# Patient Record
Sex: FEMALE | Race: WHITE | NOT HISPANIC OR LATINO | Employment: OTHER | URBAN - METROPOLITAN AREA
[De-identification: names, ages, dates, MRNs, and addresses within clinical notes are randomized per-mention and may not be internally consistent; named-entity substitution may affect disease eponyms.]

---

## 2017-05-05 ENCOUNTER — HOSPITAL ENCOUNTER (OUTPATIENT)
Dept: CARDIOLOGY | Facility: HOSPITAL | Age: 82
Discharge: HOME | End: 2017-05-05
Attending: INTERNAL MEDICINE | Admitting: INTERNAL MEDICINE

## 2017-06-06 ENCOUNTER — HOSPITAL ENCOUNTER (OUTPATIENT)
Dept: CT IMAGING | Facility: HOSPITAL | Age: 82
Discharge: HOME | End: 2017-06-06
Attending: THORACIC SURGERY (CARDIOTHORACIC VASCULAR SURGERY) | Admitting: THORACIC SURGERY (CARDIOTHORACIC VASCULAR SURGERY)

## 2017-06-06 LAB
ANION GAP SERPL CALC-SCNC: 10.6 MMOL/L (ref 10–20)
BUN SERPL-MCNC: 13 MG/DL (ref 8–20)
BUN/CREAT SERPL: 11.8 (ref 5.4–26.2)
CALCIUM SERPL-MCNC: 9.3 MG/DL (ref 8.9–10.3)
CHLORIDE SERPL-SCNC: 111 MMOL/L (ref 101–111)
CONV CO2: 26 MMOL/L (ref 22–32)
CREAT UR-MCNC: 1.1 MG/DL (ref 0.4–1)
GLUCOSE SERPL-MCNC: 111 MG/DL (ref 65–99)
POTASSIUM SERPL-SCNC: 3.6 MMOL/L (ref 3.6–5.1)
SODIUM SERPL-SCNC: 144 MMOL/L (ref 136–144)

## 2017-09-08 ENCOUNTER — HOSPITAL ENCOUNTER (OUTPATIENT)
Dept: LAB | Facility: HOSPITAL | Age: 82
Setting detail: SPECIMEN
Discharge: HOME | End: 2017-09-08
Attending: INTERNAL MEDICINE | Admitting: INTERNAL MEDICINE

## 2017-09-08 ENCOUNTER — HOSPITAL ENCOUNTER (OUTPATIENT)
Dept: ULTRASOUND IMAGING | Facility: HOSPITAL | Age: 82
Discharge: HOME | End: 2017-09-08
Attending: INTERNAL MEDICINE | Admitting: INTERNAL MEDICINE

## 2017-09-08 LAB — TSH SERPL-ACNC: 1.81 UIU/ML (ref 0.34–5.6)

## 2018-02-01 ENCOUNTER — HOSPITAL ENCOUNTER (OUTPATIENT)
Dept: LAB | Facility: HOSPITAL | Age: 83
Discharge: HOME | End: 2018-02-01
Attending: INTERNAL MEDICINE | Admitting: INTERNAL MEDICINE

## 2018-02-01 LAB
ANION GAP SERPL CALC-SCNC: 12.8 MMOL/L (ref 10–20)
BUN SERPL-MCNC: 14 MG/DL (ref 8–20)
BUN/CREAT SERPL: 12.7 (ref 5.4–26.2)
CALCIUM SERPL-MCNC: 9.9 MG/DL (ref 8.9–10.3)
CHLORIDE SERPL-SCNC: 108 MMOL/L (ref 101–111)
CONV CO2: 25 MMOL/L (ref 22–32)
CREAT UR-MCNC: 1.1 MG/DL (ref 0.4–1)
GLUCOSE SERPL-MCNC: 97 MG/DL (ref 65–99)
POTASSIUM SERPL-SCNC: 3.8 MMOL/L (ref 3.6–5.1)
SODIUM SERPL-SCNC: 142 MMOL/L (ref 136–144)

## 2018-02-08 ENCOUNTER — HOSPITAL ENCOUNTER (OUTPATIENT)
Dept: FAMILY MEDICINE CLINIC | Facility: CLINIC | Age: 83
Setting detail: SPECIMEN
Discharge: HOME | End: 2018-02-08
Attending: FAMILY MEDICINE | Admitting: FAMILY MEDICINE

## 2018-02-08 LAB
BASOPHILS # BLD AUTO: 0 10*3/UL (ref 0–0.2)
BASOPHILS NFR BLD AUTO: 0 % (ref 0–2)
CHOLEST SERPL-MCNC: 190 MG/DL
CHOLEST/HDLC SERPL: 3.3 {RATIO}
CONV LDL CHOLESTEROL DIRECT: 108 MG/DL (ref 0–100)
DIFFERENTIAL METHOD BLD: (no result)
EOSINOPHIL # BLD AUTO: 0.6 10*3/UL (ref 0–0.3)
EOSINOPHIL # BLD AUTO: 9 % (ref 0–3)
ERYTHROCYTE [DISTWIDTH] IN BLOOD BY AUTOMATED COUNT: 12.8 % (ref 11.5–14.5)
HCT VFR BLD AUTO: 33.1 % (ref 35–49)
HDLC SERPL-MCNC: 58 MG/DL
HGB BLD-MCNC: 11 G/DL (ref 12–15)
LDLC/HDLC SERPL: 1.9 {RATIO}
LIPID INTERPRETATION: ABNORMAL
LYMPHOCYTES # BLD AUTO: 1.5 10*3/UL (ref 0.8–4.8)
LYMPHOCYTES NFR BLD AUTO: 22 % (ref 18–42)
MCH RBC QN AUTO: 30.1 PG (ref 26–32)
MCHC RBC AUTO-ENTMCNC: 33.2 G/DL (ref 32–36)
MCV RBC AUTO: 90.6 FL (ref 80–94)
MONOCYTES # BLD AUTO: 0.6 10*3/UL (ref 0.1–1.3)
MONOCYTES NFR BLD AUTO: 9 % (ref 2–11)
NEUTROPHILS # BLD AUTO: 4.1 10*3/UL (ref 2.3–8.6)
NEUTROPHILS NFR BLD AUTO: 60 % (ref 50–75)
NRBC BLD AUTO-RTO: 0 /100{WBCS}
NRBC/RBC NFR BLD MANUAL: 0 10*3/UL
PLATELET # BLD AUTO: 236 10*3/UL (ref 150–450)
PMV BLD AUTO: 8 FL (ref 7.4–10.4)
RBC # BLD AUTO: 3.65 10*6/UL (ref 4–5.4)
TRIGL SERPL-MCNC: 111 MG/DL
VLDLC SERPL CALC-MCNC: 24 MG/DL
WBC # BLD AUTO: 6.9 10*3/UL (ref 4.5–11.5)

## 2018-07-31 ENCOUNTER — INPATIENT HOSPITAL (AMBULATORY)
Dept: URBAN - METROPOLITAN AREA HOSPITAL 84 | Facility: HOSPITAL | Age: 83
End: 2018-07-31
Payer: MEDICARE

## 2018-07-31 DIAGNOSIS — D50.9 IRON DEFICIENCY ANEMIA, UNSPECIFIED: ICD-10-CM

## 2018-07-31 DIAGNOSIS — R07.9 CHEST PAIN, UNSPECIFIED: ICD-10-CM

## 2018-07-31 PROCEDURE — 99222 1ST HOSP IP/OBS MODERATE 55: CPT | Performed by: NURSE PRACTITIONER

## 2018-08-01 ENCOUNTER — INPATIENT HOSPITAL (AMBULATORY)
Dept: URBAN - METROPOLITAN AREA HOSPITAL 84 | Facility: HOSPITAL | Age: 83
End: 2018-08-01
Payer: MEDICARE

## 2018-08-01 DIAGNOSIS — K57.30 DIVERTICULOSIS OF LARGE INTESTINE WITHOUT PERFORATION OR ABS: ICD-10-CM

## 2018-08-01 DIAGNOSIS — D50.9 IRON DEFICIENCY ANEMIA, UNSPECIFIED: ICD-10-CM

## 2018-08-01 DIAGNOSIS — K44.9 DIAPHRAGMATIC HERNIA WITHOUT OBSTRUCTION OR GANGRENE: ICD-10-CM

## 2018-08-01 DIAGNOSIS — K64.9 UNSPECIFIED HEMORRHOIDS: ICD-10-CM

## 2018-08-01 DIAGNOSIS — D12.3 BENIGN NEOPLASM OF TRANSVERSE COLON: ICD-10-CM

## 2018-08-01 DIAGNOSIS — C18.0 MALIGNANT NEOPLASM OF CECUM: ICD-10-CM

## 2018-08-01 PROCEDURE — 45380 COLONOSCOPY AND BIOPSY: CPT | Performed by: INTERNAL MEDICINE

## 2018-08-01 PROCEDURE — 43235 EGD DIAGNOSTIC BRUSH WASH: CPT | Performed by: INTERNAL MEDICINE

## 2018-08-02 ENCOUNTER — INPATIENT HOSPITAL (AMBULATORY)
Dept: URBAN - METROPOLITAN AREA HOSPITAL 84 | Facility: HOSPITAL | Age: 83
End: 2018-08-02
Payer: MEDICARE

## 2018-08-02 DIAGNOSIS — R07.9 CHEST PAIN, UNSPECIFIED: ICD-10-CM

## 2018-08-02 DIAGNOSIS — D50.9 IRON DEFICIENCY ANEMIA, UNSPECIFIED: ICD-10-CM

## 2018-08-02 PROCEDURE — 99232 SBSQ HOSP IP/OBS MODERATE 35: CPT | Performed by: NURSE PRACTITIONER

## 2018-08-22 ENCOUNTER — HOSPITAL ENCOUNTER (OUTPATIENT)
Dept: LAB | Facility: HOSPITAL | Age: 83
Discharge: HOME | End: 2018-08-22
Attending: SURGERY | Admitting: SURGERY

## 2018-08-22 LAB
ALBUMIN SERPL-MCNC: 3.1 G/DL (ref 3.5–4.8)
ALBUMIN/GLOB SERPL: 0.9 {RATIO} (ref 1–1.7)
ALP SERPL-CCNC: 56 IU/L (ref 32–91)
ALT SERPL-CCNC: 13 IU/L (ref 14–54)
ANION GAP SERPL CALC-SCNC: 9.4 MMOL/L (ref 10–20)
AST SERPL-CCNC: 21 IU/L (ref 15–41)
BILIRUB SERPL-MCNC: 0.4 MG/DL (ref 0.3–1.2)
BUN SERPL-MCNC: 15 MG/DL (ref 8–20)
BUN/CREAT SERPL: 11.5 (ref 5.4–26.2)
CALCIUM SERPL-MCNC: 9.3 MG/DL (ref 8.9–10.3)
CHLORIDE SERPL-SCNC: 108 MMOL/L (ref 101–111)
CONV ABS BANDS: 0.9 10*3/UL
CONV ANISOCYTES: (no result)
CONV CO2: 27 MMOL/L (ref 22–32)
CONV MICROCYTES IN BLOOD BY LIGHT MICROSCOPY: SLIGHT
CONV POIKILOCYTOSIS IN BLOOD BY LIGHT MICROSCOPY: (no result)
CONV TOTAL PROTEIN: 6.7 G/DL (ref 6.1–7.9)
CONV TOXIC GRANULES IN BLOOD BY LIGHT MICROSCOPY: SLIGHT
CREAT UR-MCNC: 1.3 MG/DL (ref 0.4–1)
DIFFERENTIAL METHOD BLD: (no result)
EOSINOPHIL # BLD AUTO: 0.1 10*3/UL (ref 0–0.3)
EOSINOPHIL # BLD AUTO: 2 % (ref 0–3)
ERYTHROCYTE [DISTWIDTH] IN BLOOD BY AUTOMATED COUNT: (no result) % (ref 11.5–14.5)
GLOBULIN UR ELPH-MCNC: 3.6 G/DL (ref 2.5–3.8)
GLUCOSE SERPL-MCNC: 118 MG/DL (ref 65–99)
HCT VFR BLD AUTO: 30.6 % (ref 35–49)
HGB BLD-MCNC: 9.6 G/DL (ref 12–15)
IRON SERPL-MCNC: 40 UG/DL (ref 28–170)
LYMPHOCYTES # BLD AUTO: 1.6 10*3/UL (ref 0.8–4.8)
LYMPHOCYTES NFR BLD AUTO: 23 % (ref 18–42)
MAGNESIUM SERPL-MCNC: 2.1 MG/DL (ref 1.8–2.5)
MCH RBC QN AUTO: 25 PG (ref 26–32)
MCHC RBC AUTO-ENTMCNC: 31.3 G/DL (ref 32–36)
MCV RBC AUTO: 79.8 FL (ref 80–94)
MONOCYTES # BLD AUTO: 0.5 10*3/UL (ref 0.1–1.3)
MONOCYTES NFR BLD AUTO: 7 % (ref 2–11)
NEUTROPHILS # BLD AUTO: 3.7 10*3/UL (ref 2.3–8.6)
NEUTROPHILS NFR BLD AUTO: 55 % (ref 50–75)
NEUTS BAND NFR BLD MANUAL: 13 % (ref 0–5)
PLATELET # BLD AUTO: 418 10*3/UL (ref 150–450)
PMV BLD AUTO: 7.7 FL (ref 7.4–10.4)
POTASSIUM SERPL-SCNC: 4.4 MMOL/L (ref 3.6–5.1)
RBC # BLD AUTO: 3.84 10*6/UL (ref 4–5.4)
SODIUM SERPL-SCNC: 140 MMOL/L (ref 136–144)
WBC # BLD AUTO: 6.8 10*3/UL (ref 4.5–11.5)

## 2018-11-20 ENCOUNTER — HOSPITAL ENCOUNTER (OUTPATIENT)
Dept: FAMILY MEDICINE CLINIC | Facility: CLINIC | Age: 83
Setting detail: SPECIMEN
Discharge: HOME | End: 2018-11-20
Attending: FAMILY MEDICINE | Admitting: FAMILY MEDICINE

## 2018-11-20 LAB
ANION GAP SERPL CALC-SCNC: 11.9 MMOL/L (ref 10–20)
BASOPHILS # BLD AUTO: 0 10*3/UL (ref 0–0.2)
BASOPHILS NFR BLD AUTO: 0 % (ref 0–2)
BUN SERPL-MCNC: 19 MG/DL (ref 8–20)
BUN/CREAT SERPL: 19 (ref 5.4–26.2)
CALCIUM SERPL-MCNC: 9.8 MG/DL (ref 8.9–10.3)
CHLORIDE SERPL-SCNC: 108 MMOL/L (ref 101–111)
CONV CO2: 26 MMOL/L (ref 22–32)
CONV SMEAR REVIEW: (no result)
CREAT UR-MCNC: 1 MG/DL (ref 0.4–1)
DIFFERENTIAL METHOD BLD: (no result)
EOSINOPHIL # BLD AUTO: 0.5 10*3/UL (ref 0–0.3)
EOSINOPHIL # BLD AUTO: 7 % (ref 0–3)
ERYTHROCYTE [DISTWIDTH] IN BLOOD BY AUTOMATED COUNT: 14.3 % (ref 11.5–14.5)
GLUCOSE SERPL-MCNC: 132 MG/DL (ref 65–99)
HCT VFR BLD AUTO: 40.7 % (ref 35–49)
HGB BLD-MCNC: 13.7 G/DL (ref 12–15)
LYMPHOCYTES # BLD AUTO: 2 10*3/UL (ref 0.8–4.8)
LYMPHOCYTES NFR BLD AUTO: 28 % (ref 18–42)
MCH RBC QN AUTO: 30.6 PG (ref 26–32)
MCHC RBC AUTO-ENTMCNC: 33.7 G/DL (ref 32–36)
MCV RBC AUTO: 90.6 FL (ref 80–94)
MONOCYTES # BLD AUTO: 0.5 10*3/UL (ref 0.1–1.3)
MONOCYTES NFR BLD AUTO: 7 % (ref 2–11)
NEUTROPHILS # BLD AUTO: 4 10*3/UL (ref 2.3–8.6)
NEUTROPHILS NFR BLD AUTO: 58 % (ref 50–75)
NRBC BLD AUTO-RTO: 0 /100{WBCS}
PLATELET # BLD AUTO: 226 10*3/UL (ref 150–450)
PMV BLD AUTO: 8 FL (ref 7.4–10.4)
POTASSIUM SERPL-SCNC: 3.9 MMOL/L (ref 3.6–5.1)
RBC # BLD AUTO: 4.49 10*6/UL (ref 4–5.4)
SODIUM SERPL-SCNC: 142 MMOL/L (ref 136–144)
WBC # BLD AUTO: 7 10*3/UL (ref 4.5–11.5)

## 2019-03-29 ENCOUNTER — HOSPITAL ENCOUNTER (OUTPATIENT)
Dept: LAB | Facility: HOSPITAL | Age: 84
Discharge: HOME | End: 2019-03-29
Attending: INTERNAL MEDICINE | Admitting: INTERNAL MEDICINE

## 2019-03-29 LAB
ALBUMIN SERPL-MCNC: 4 G/DL (ref 3.5–4.8)
ALBUMIN/GLOB SERPL: 1.2 {RATIO} (ref 1–1.7)
ALP SERPL-CCNC: 48 IU/L (ref 32–91)
ALT SERPL-CCNC: 18 IU/L (ref 14–54)
ANION GAP SERPL CALC-SCNC: 15 MMOL/L (ref 10–20)
AST SERPL-CCNC: 21 IU/L (ref 15–41)
BASOPHILS # BLD AUTO: 0 10*3/UL (ref 0–0.2)
BASOPHILS NFR BLD AUTO: 1 % (ref 0–2)
BILIRUB SERPL-MCNC: 0.8 MG/DL (ref 0.3–1.2)
BUN SERPL-MCNC: 26 MG/DL (ref 8–20)
BUN/CREAT SERPL: 20 (ref 5.4–26.2)
CALCIUM SERPL-MCNC: 10.2 MG/DL (ref 8.9–10.3)
CHLORIDE SERPL-SCNC: 106 MMOL/L (ref 101–111)
CHOLEST SERPL-MCNC: 218 MG/DL
CHOLEST/HDLC SERPL: 4.1 {RATIO}
CK SERPL-CCNC: 113 IU/L (ref 38–234)
CONV CO2: 26 MMOL/L (ref 22–32)
CONV LDL CHOLESTEROL DIRECT: 139 MG/DL (ref 0–100)
CONV TOTAL PROTEIN: 7.3 G/DL (ref 6.1–7.9)
CREAT UR-MCNC: 1.3 MG/DL (ref 0.4–1)
DIFFERENTIAL METHOD BLD: (no result)
EOSINOPHIL # BLD AUTO: 1 10*3/UL (ref 0–0.3)
EOSINOPHIL # BLD AUTO: 14 % (ref 0–3)
ERYTHROCYTE [DISTWIDTH] IN BLOOD BY AUTOMATED COUNT: 13.1 % (ref 11.5–14.5)
GLOBULIN UR ELPH-MCNC: 3.3 G/DL (ref 2.5–3.8)
GLUCOSE SERPL-MCNC: 93 MG/DL (ref 65–99)
HCT VFR BLD AUTO: 43.1 % (ref 35–49)
HDLC SERPL-MCNC: 53 MG/DL
HGB BLD-MCNC: 14.5 G/DL (ref 12–15)
LDLC/HDLC SERPL: 2.6 {RATIO}
LIPID INTERPRETATION: ABNORMAL
LYMPHOCYTES # BLD AUTO: 1.8 10*3/UL (ref 0.8–4.8)
LYMPHOCYTES NFR BLD AUTO: 26 % (ref 18–42)
MCH RBC QN AUTO: 32 PG (ref 26–32)
MCHC RBC AUTO-ENTMCNC: 33.7 G/DL (ref 32–36)
MCV RBC AUTO: 94.9 FL (ref 80–94)
MONOCYTES # BLD AUTO: 0.6 10*3/UL (ref 0.1–1.3)
MONOCYTES NFR BLD AUTO: 8 % (ref 2–11)
NEUTROPHILS # BLD AUTO: 3.4 10*3/UL (ref 2.3–8.6)
NEUTROPHILS NFR BLD AUTO: 51 % (ref 50–75)
NRBC BLD AUTO-RTO: 0 /100{WBCS}
NRBC/RBC NFR BLD MANUAL: 0 10*3/UL
PLATELET # BLD AUTO: 233 10*3/UL (ref 150–450)
PMV BLD AUTO: 7.4 FL (ref 7.4–10.4)
POTASSIUM SERPL-SCNC: 5 MMOL/L (ref 3.6–5.1)
RBC # BLD AUTO: 4.54 10*6/UL (ref 4–5.4)
SODIUM SERPL-SCNC: 142 MMOL/L (ref 136–144)
TRIGL SERPL-MCNC: 154 MG/DL
VLDLC SERPL CALC-MCNC: 26.1 MG/DL
WBC # BLD AUTO: 6.7 10*3/UL (ref 4.5–11.5)

## 2019-06-22 ENCOUNTER — HOSPITAL ENCOUNTER (EMERGENCY)
Facility: HOSPITAL | Age: 84
Discharge: HOME | End: 2019-06-22
Attending: EMERGENCY MEDICINE | Admitting: EMERGENCY MEDICINE

## 2019-06-22 ENCOUNTER — APPOINTMENT (OUTPATIENT)
Dept: GENERAL RADIOLOGY | Facility: HOSPITAL | Age: 84
End: 2019-06-22

## 2019-06-22 VITALS
OXYGEN SATURATION: 90 % | DIASTOLIC BLOOD PRESSURE: 65 MMHG | BODY MASS INDEX: 24.34 KG/M2 | WEIGHT: 151.46 LBS | HEIGHT: 66 IN | HEART RATE: 95 BPM | TEMPERATURE: 98.4 F | SYSTOLIC BLOOD PRESSURE: 135 MMHG | RESPIRATION RATE: 16 BRPM

## 2019-06-22 DIAGNOSIS — R11.2 NAUSEA AND VOMITING, INTRACTABILITY OF VOMITING NOT SPECIFIED, UNSPECIFIED VOMITING TYPE: Primary | ICD-10-CM

## 2019-06-22 DIAGNOSIS — R19.7 DIARRHEA, UNSPECIFIED TYPE: ICD-10-CM

## 2019-06-22 LAB
ALBUMIN SERPL-MCNC: 3.8 G/DL (ref 3.5–4.8)
ALBUMIN/GLOB SERPL: 1.1 G/DL (ref 1–1.7)
ALP SERPL-CCNC: 49 U/L (ref 32–91)
ALT SERPL W P-5'-P-CCNC: 15 U/L (ref 14–54)
ANION GAP SERPL CALCULATED.3IONS-SCNC: 12 MMOL/L (ref 10–20)
AST SERPL-CCNC: 15 U/L (ref 15–41)
BACTERIA UR QL AUTO: ABNORMAL /HPF
BASOPHILS # BLD AUTO: 0 10*3/MM3 (ref 0–0.2)
BASOPHILS NFR BLD AUTO: 0.2 % (ref 0–1.5)
BILIRUB SERPL-MCNC: 0.8 MG/DL (ref 0.3–1.2)
BILIRUB UR QL STRIP: NEGATIVE
BNP SERPL-MCNC: 43 PG/ML
BUN BLD-MCNC: 19 MG/DL (ref 8–20)
BUN/CREAT SERPL: 15.8 (ref 5.4–26.2)
CALCIUM SPEC-SCNC: 10 MG/DL (ref 8.9–10.3)
CHLORIDE SERPL-SCNC: 109 MMOL/L (ref 101–111)
CLARITY UR: ABNORMAL
CO2 SERPL-SCNC: 18 MMOL/L (ref 22–32)
COD CRY URNS QL: ABNORMAL /HPF
COLOR UR: ABNORMAL
CREAT BLD-MCNC: 1.2 MG/DL (ref 0.4–1)
DEPRECATED RDW RBC AUTO: 42 FL (ref 37–54)
EOSINOPHIL # BLD AUTO: 0.8 10*3/MM3 (ref 0–0.4)
EOSINOPHIL NFR BLD AUTO: 7.2 % (ref 0.3–6.2)
ERYTHROCYTE [DISTWIDTH] IN BLOOD BY AUTOMATED COUNT: 12.7 % (ref 12.3–15.4)
GFR SERPL CREATININE-BSD FRML MDRD: 42 ML/MIN/1.73
GLOBULIN UR ELPH-MCNC: 3.5 GM/DL (ref 2.5–3.8)
GLUCOSE BLD-MCNC: 127 MG/DL (ref 65–99)
GLUCOSE UR STRIP-MCNC: NEGATIVE MG/DL
GRAN CASTS URNS QL MICRO: ABNORMAL /LPF
HCT VFR BLD AUTO: 42.1 % (ref 34–46.6)
HGB BLD-MCNC: 14.2 G/DL (ref 12–15.9)
HGB UR QL STRIP.AUTO: NEGATIVE
HYALINE CASTS UR QL AUTO: ABNORMAL /LPF
KETONES UR QL STRIP: NEGATIVE
LEUKOCYTE ESTERASE UR QL STRIP.AUTO: ABNORMAL
LYMPHOCYTES # BLD AUTO: 1.2 10*3/MM3 (ref 0.7–3.1)
LYMPHOCYTES NFR BLD AUTO: 10.4 % (ref 19.6–45.3)
MCH RBC QN AUTO: 31.8 PG (ref 26.6–33)
MCHC RBC AUTO-ENTMCNC: 33.7 G/DL (ref 31.5–35.7)
MCV RBC AUTO: 94.4 FL (ref 79–97)
MONOCYTES # BLD AUTO: 0.7 10*3/MM3 (ref 0.1–0.9)
MONOCYTES NFR BLD AUTO: 6.4 % (ref 5–12)
NEUTROPHILS # BLD AUTO: 8.4 10*3/MM3 (ref 1.7–7)
NEUTROPHILS NFR BLD AUTO: 75.8 % (ref 42.7–76)
NITRITE UR QL STRIP: NEGATIVE
NRBC BLD AUTO-RTO: 0 /100 WBC (ref 0–0.2)
PH UR STRIP.AUTO: 6 [PH] (ref 5–8)
PLATELET # BLD AUTO: 237 10*3/MM3 (ref 140–450)
PMV BLD AUTO: 8.2 FL (ref 6–12)
POTASSIUM BLD-SCNC: 4 MMOL/L (ref 3.6–5.1)
PROT SERPL-MCNC: 7.3 G/DL (ref 6.1–7.9)
PROT UR QL STRIP: ABNORMAL
RBC # BLD AUTO: 4.46 10*6/MM3 (ref 3.77–5.28)
RBC # UR: ABNORMAL /HPF
REF LAB TEST METHOD: ABNORMAL
SODIUM BLD-SCNC: 139 MMOL/L (ref 136–144)
SP GR UR STRIP: 1.02 (ref 1–1.03)
SQUAMOUS #/AREA URNS HPF: ABNORMAL /HPF
TROPONIN I SERPL-MCNC: <0.03 NG/ML (ref 0–0.03)
UROBILINOGEN UR QL STRIP: ABNORMAL
WBC NRBC COR # BLD: 11.1 10*3/MM3 (ref 3.4–10.8)
WBC UR QL AUTO: ABNORMAL /HPF
YEAST URNS QL MICRO: ABNORMAL /HPF

## 2019-06-22 PROCEDURE — 80053 COMPREHEN METABOLIC PANEL: CPT | Performed by: EMERGENCY MEDICINE

## 2019-06-22 PROCEDURE — 25010000002 ONDANSETRON PER 1 MG: Performed by: EMERGENCY MEDICINE

## 2019-06-22 PROCEDURE — 81001 URINALYSIS AUTO W/SCOPE: CPT | Performed by: EMERGENCY MEDICINE

## 2019-06-22 PROCEDURE — 84484 ASSAY OF TROPONIN QUANT: CPT | Performed by: EMERGENCY MEDICINE

## 2019-06-22 PROCEDURE — 96374 THER/PROPH/DIAG INJ IV PUSH: CPT

## 2019-06-22 PROCEDURE — 99284 EMERGENCY DEPT VISIT MOD MDM: CPT

## 2019-06-22 PROCEDURE — 83880 ASSAY OF NATRIURETIC PEPTIDE: CPT | Performed by: EMERGENCY MEDICINE

## 2019-06-22 PROCEDURE — 71045 X-RAY EXAM CHEST 1 VIEW: CPT

## 2019-06-22 PROCEDURE — 85025 COMPLETE CBC W/AUTO DIFF WBC: CPT | Performed by: EMERGENCY MEDICINE

## 2019-06-22 RX ORDER — SODIUM CHLORIDE 0.9 % (FLUSH) 0.9 %
10 SYRINGE (ML) INJECTION AS NEEDED
Status: DISCONTINUED | OUTPATIENT
Start: 2019-06-22 | End: 2019-06-23 | Stop reason: HOSPADM

## 2019-06-22 RX ORDER — ONDANSETRON 4 MG/1
4 TABLET, FILM COATED ORAL 4 TIMES DAILY PRN
Qty: 15 TABLET | Refills: 0 | Status: SHIPPED | OUTPATIENT
Start: 2019-06-22 | End: 2019-07-05 | Stop reason: SDUPTHER

## 2019-06-22 RX ORDER — ONDANSETRON 2 MG/ML
4 INJECTION INTRAMUSCULAR; INTRAVENOUS ONCE
Status: COMPLETED | OUTPATIENT
Start: 2019-06-22 | End: 2019-06-22

## 2019-06-22 RX ADMIN — SODIUM CHLORIDE, SODIUM LACTATE, POTASSIUM CHLORIDE, AND CALCIUM CHLORIDE 500 ML: .6; .31; .03; .02 INJECTION, SOLUTION INTRAVENOUS at 20:51

## 2019-06-22 RX ADMIN — ONDANSETRON 4 MG: 2 INJECTION INTRAMUSCULAR; INTRAVENOUS at 23:05

## 2019-06-28 RX ORDER — ASPIRIN 81 MG/1
1 TABLET ORAL DAILY
COMMUNITY
Start: 2012-11-08 | End: 2019-09-24

## 2019-06-28 RX ORDER — NITROGLYCERIN 0.4 MG/1
TABLET SUBLINGUAL
COMMUNITY
Start: 2019-04-08 | End: 2021-01-04 | Stop reason: SDUPTHER

## 2019-07-05 ENCOUNTER — OFFICE VISIT (OUTPATIENT)
Dept: FAMILY MEDICINE CLINIC | Facility: CLINIC | Age: 84
End: 2019-07-05

## 2019-07-05 VITALS
DIASTOLIC BLOOD PRESSURE: 76 MMHG | OXYGEN SATURATION: 95 % | SYSTOLIC BLOOD PRESSURE: 137 MMHG | WEIGHT: 153 LBS | TEMPERATURE: 98.2 F | HEIGHT: 64 IN | HEART RATE: 78 BPM | BODY MASS INDEX: 26.12 KG/M2

## 2019-07-05 DIAGNOSIS — G89.29 CHRONIC PAIN OF BOTH KNEES: Primary | ICD-10-CM

## 2019-07-05 DIAGNOSIS — M25.561 CHRONIC PAIN OF BOTH KNEES: Primary | ICD-10-CM

## 2019-07-05 DIAGNOSIS — R11.0 NAUSEA: ICD-10-CM

## 2019-07-05 DIAGNOSIS — M25.562 CHRONIC PAIN OF BOTH KNEES: Primary | ICD-10-CM

## 2019-07-05 PROCEDURE — 99213 OFFICE O/P EST LOW 20 MIN: CPT | Performed by: FAMILY MEDICINE

## 2019-07-05 RX ORDER — ONDANSETRON 4 MG/1
4 TABLET, ORALLY DISINTEGRATING ORAL EVERY 8 HOURS PRN
Qty: 20 TABLET | Refills: 0 | Status: SHIPPED | OUTPATIENT
Start: 2019-07-05 | End: 2019-09-24

## 2019-07-05 RX ORDER — BRINZOLAMIDE 1 %
SUSPENSION, DROPS(FINAL DOSAGE FORM)(ML) OPHTHALMIC (EYE)
COMMUNITY
Start: 2019-05-14 | End: 2019-07-05

## 2019-07-05 RX ORDER — NETARSUDIL 0.2 MG/ML
SOLUTION/ DROPS OPHTHALMIC; TOPICAL
COMMUNITY
Start: 2019-06-17 | End: 2021-10-11

## 2019-07-05 NOTE — PROGRESS NOTES
Subjective   Flori Tubbs is a 87 y.o. female.     Knee Pain    Incident onset: 4-6 months. There was no injury mechanism. The pain is present in the left knee and right knee. The pain is at a severity of 4/10. The pain is moderate. The pain has been intermittent since onset.   Nausea   This is a new problem. The current episode started in the past 7 days. The problem occurs intermittently. The problem has been gradually improving. Associated symptoms include fatigue and nausea. Pertinent negatives include no abdominal pain, chest pain, coughing, fever, rash, sore throat, vomiting or weakness. She has tried drinking for the symptoms.        The following portions of the patient's history were reviewed and updated as appropriate: problem list.medication and allergies.    Review of Systems   Constitutional: Positive for fatigue. Negative for fever.   HENT: Negative for ear pain, postnasal drip, sinus pressure and sore throat.    Eyes: Negative for blurred vision.   Respiratory: Negative for cough, shortness of breath and wheezing.    Cardiovascular: Negative for chest pain, palpitations and leg swelling.   Gastrointestinal: Positive for nausea. Negative for abdominal pain, diarrhea and vomiting.   Musculoskeletal: Negative for back pain.   Skin: Negative for rash.   Neurological: Negative for dizziness, tremors, weakness and headache.   Psychiatric/Behavioral: The patient is not nervous/anxious.        Objective   Physical Exam   Constitutional: She is oriented to person, place, and time. She appears well-developed.   HENT:   Head: Normocephalic.   Eyes: EOM are normal. Pupils are equal, round, and reactive to light.   Neck: Normal range of motion. Neck supple.   Cardiovascular: Normal rate and regular rhythm.   Pulmonary/Chest: Breath sounds normal. She has no wheezes. She exhibits no tenderness.   Abdominal: Soft. Bowel sounds are normal.   Musculoskeletal: Normal range of motion.   Neurological: She is alert and  oriented to person, place, and time.   Skin: No rash noted.   Psychiatric: She has a normal mood and affect.   Vitals reviewed.        Assessment/Plan   Problems Addressed this Visit        Digestive    Nausea     Discussed symptomatic management, encourage fluids and Rx Zofran prn.            Musculoskeletal and Integument    Chronic pain of both knees - Primary     Discussed strengthening  and  Stretching exercise and activity as tolerate.         Relevant Orders    Ambulatory Referral to Orthopedic Surgery

## 2019-07-07 PROBLEM — G89.29 CHRONIC PAIN OF BOTH KNEES: Status: ACTIVE | Noted: 2019-07-07

## 2019-07-07 PROBLEM — M25.561 CHRONIC PAIN OF BOTH KNEES: Status: ACTIVE | Noted: 2019-07-07

## 2019-07-07 PROBLEM — R11.0 NAUSEA: Status: ACTIVE | Noted: 2019-07-07

## 2019-07-07 PROBLEM — M25.562 CHRONIC PAIN OF BOTH KNEES: Status: ACTIVE | Noted: 2019-07-07

## 2019-09-12 PROBLEM — R53.1 WEAKNESS: Status: ACTIVE | Noted: 2018-08-23

## 2019-09-12 PROBLEM — C18.2 COLON CANCER, ASCENDING: Status: ACTIVE | Noted: 2018-08-22

## 2019-09-12 PROBLEM — N17.9 ACUTE NONTRAUMATIC KIDNEY INJURY (HCC): Status: ACTIVE | Noted: 2017-05-02

## 2019-09-12 PROBLEM — M19.90 ARTHRITIS: Status: ACTIVE | Noted: 2019-09-12

## 2019-09-12 PROBLEM — I10 HYPERTENSION: Status: ACTIVE | Noted: 2019-09-12

## 2019-09-12 PROBLEM — E87.5 HYPERKALEMIA: Status: ACTIVE | Noted: 2017-05-02

## 2019-09-12 PROBLEM — K57.92 DIVERTICULITIS: Status: ACTIVE | Noted: 2019-09-12

## 2019-09-12 PROBLEM — R00.2 PALPITATIONS: Status: ACTIVE | Noted: 2017-05-02

## 2019-09-12 PROBLEM — I21.3 ST ELEVATION (STEMI) MYOCARDIAL INFARCTION (HCC): Status: ACTIVE | Noted: 2019-09-12

## 2019-09-12 PROBLEM — D50.9 ANEMIA, IRON DEFICIENCY: Status: ACTIVE | Noted: 2018-08-23

## 2019-09-12 PROBLEM — I63.9 CEREBROVASCULAR ACCIDENT (CVA) (HCC): Status: ACTIVE | Noted: 2019-09-12

## 2019-09-12 PROBLEM — J45.909 ASTHMA: Status: ACTIVE | Noted: 2019-09-12

## 2019-09-16 DIAGNOSIS — E04.2 MULTINODULAR GOITER: Primary | ICD-10-CM

## 2019-09-17 ENCOUNTER — LAB (OUTPATIENT)
Dept: LAB | Facility: HOSPITAL | Age: 84
End: 2019-09-17

## 2019-09-17 DIAGNOSIS — E04.2 MULTINODULAR GOITER: ICD-10-CM

## 2019-09-17 LAB — TSH SERPL DL<=0.05 MIU/L-ACNC: 0.98 UIU/ML (ref 0.34–5.6)

## 2019-09-17 PROCEDURE — 84443 ASSAY THYROID STIM HORMONE: CPT

## 2019-09-17 PROCEDURE — 36415 COLL VENOUS BLD VENIPUNCTURE: CPT

## 2019-09-19 DIAGNOSIS — E04.2 MULTINODULAR GOITER: Primary | ICD-10-CM

## 2019-09-23 ENCOUNTER — HOSPITAL ENCOUNTER (OUTPATIENT)
Dept: ULTRASOUND IMAGING | Facility: HOSPITAL | Age: 84
Discharge: HOME | End: 2019-09-23
Admitting: INTERNAL MEDICINE

## 2019-09-23 DIAGNOSIS — E04.2 MULTINODULAR GOITER: ICD-10-CM

## 2019-09-23 PROCEDURE — 76536 US EXAM OF HEAD AND NECK: CPT

## 2019-09-24 ENCOUNTER — OFFICE VISIT (OUTPATIENT)
Dept: ENDOCRINOLOGY | Facility: CLINIC | Age: 84
End: 2019-09-24

## 2019-09-24 VITALS
OXYGEN SATURATION: 96 % | SYSTOLIC BLOOD PRESSURE: 135 MMHG | BODY MASS INDEX: 24.43 KG/M2 | HEIGHT: 66 IN | WEIGHT: 152 LBS | DIASTOLIC BLOOD PRESSURE: 75 MMHG | HEART RATE: 109 BPM

## 2019-09-24 DIAGNOSIS — E04.2 MULTINODULAR GOITER: Primary | ICD-10-CM

## 2019-09-24 PROCEDURE — 99212 OFFICE O/P EST SF 10 MIN: CPT | Performed by: INTERNAL MEDICINE

## 2019-09-24 NOTE — PROGRESS NOTES
Endocrine Progress Note Outpatient     Patient Care Team:  Umberto White MD as PCP - General  Umberto White MD as PCP - Family Medicine    Chief Complaint: Thyroid nodules    HPI: 87-year-old female with history of multiple thyroid nodules is here for follow-up.  She has done biopsy of the right and estimates area nodule in the past which was benign.  She does have a history of thyroid cancer in the mother.  She is euthyroid not taking any medications.  No history of radiation exposure.  No trouble swallowing or choking.    Past Medical History:   Diagnosis Date   • Hypertension        Social History     Socioeconomic History   • Marital status:      Spouse name: Not on file   • Number of children: Not on file   • Years of education: Not on file   • Highest education level: Not on file   Tobacco Use   • Smoking status: Never Smoker   • Smokeless tobacco: Never Used   Substance and Sexual Activity   • Alcohol use: No     Frequency: Never   • Drug use: No   • Sexual activity: Defer       Family History   Problem Relation Age of Onset   • Cancer Mother    • Cancer Father    • Cancer Sister        Allergies   Allergen Reactions   • Epinephrine Unknown (See Comments)   • Sulfamethoxazole-Trimethoprim Rash       ROS:   Constitutional:  Denies fatigue, tiredness.    Eyes:  Denies change in visual acuity   HENT:  Denies nasal congestion or sore throat   Respiratory: denies cough, shortness of breath.   Cardiovascular:  denies chest pain, edema   GI:  Denies abdominal pain, nausea, vomiting.   Musculoskeletal:  Denies back pain or joint pain   Integument:  Denies dry skin and rash   Neurologic:  Denies headache, focal weakness or sensory changes   Endocrine:  Denies polyuria or polydipsia   Psychiatric:  Denies depression or anxiety      Vitals:    09/24/19 1252   BP: 135/75   Pulse: 109   SpO2: 96%       Physical Exam:  GEN: NAD, conversant  EYES: EOMI, PERRL, no conjunctival erythema  NECK: no thyromegaly,  full ROM   CV: RRR, no murmurs/rubs/gallops, no peripheral edema  LUNG: CTAB, no wheezes/rales/ronchi  SKIN: no rashes, no acanthosis  MSK: no deformities, full ROM of all extremities  NEURO: no tremors, DTR normal  PSYCH: AOX3, appropriate mood, affect normal      Results Review:     I reviewed the patient's new clinical results.    No results found for: HGBA1C   Lab Results   Component Value Date    GLUCOSE 127 (H) 06/22/2019    BUN 19 06/22/2019    CREATININE 1.20 (H) 06/22/2019    EGFRIFNONA 42 (L) 06/22/2019    BCR 15.8 06/22/2019    K 4.0 06/22/2019    CO2 18.0 (L) 06/22/2019    CALCIUM 10.0 06/22/2019    ALBUMIN 3.80 06/22/2019    LABIL2 1.2 03/29/2019    AST 15 06/22/2019    ALT 15 06/22/2019    CHOL 218 (H) 03/29/2019    TRIG 154 (H) 03/29/2019     (H) 03/29/2019    HDL 53 03/29/2019     Lab Results   Component Value Date    TSH 0.980 09/17/2019     US Thyroid [[id number]] (Order [id number])   Order   Status: Final result   In Basket Actions     Reviewed  Result Note  View in In Basket   Appointment Information     PACS Images      Radiology Images   Study Result     DATE OF EXAM:  9/23/2019 6:10 PM     PROCEDURE:  US THYROID-     INDICATIONS:  GOITER; 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 History of multinodular  goiter     COMPARISON:  Ultrasound 08/30/2016     TECHNIQUE:   Grayscale and color and Doppler ultrasound imaging of the thyroid  performed according to routine thyroid ultrasound protocol, real time  with image documentation.      FINDINGS:  The right thyroid lobe measures up to 4.1 cm and the left thyroid lobe  measures up to 3.5 cm. The isthmus is 2 mm in thickness. The parenchyma  appears diffusely heterogeneous. There is normal vascularity. There is a  hypoechoic partially cystic spongiform nodule seen within the superior  pole of the left thyroid lobe measuring up to 1.4 cm. There is a  hypoechoic ovoid nodule present near the isthmus extending to the right  thyroid lobe measuring up to 1.6  cm. There is a partially cystic  hypoechoic nodule present within the inferior pole measuring up to 1.3  cm. A partially cystic hypoechoic nodules within the inferior pole  measuring up to 1.2 cm.      IMPRESSION:  Findings consistent with multinodular goiter, similar as compared to the  previous study. Annual sonographic evaluation is recommended to evaluate  for stability given partially cystic appearance and size of some of the  nodules.     Electronically Signed By-Fifi Rodriguez On:9/23/2019 6:45 PM  This report was finalized on [id number] by  Fifi Rodriguez, .         Medication Review: Reviewed.       Current Outpatient Medications:   •  bimatoprost (LUMIGAN) 0.01 % ophthalmic drops, LUMIGAN 0.01 % SOLN, Disp: , Rfl:   •  MULTIPLE VITAMIN PO, Take 1 tablet by mouth Daily., Disp: , Rfl:   •  nitroglycerin (NITROSTAT) 0.4 MG SL tablet, NITROSTAT 0.4 MG SUBL, Disp: , Rfl:   •  RHOPRESSA 0.02 % solution, , Disp: , Rfl:       Assessment/Plan   Multiple thyroid nodules: Previous biopsy of the right and isthmus area nodule was benign.  Recent thyroid ultrasound from September 2019 is a stable.  She is euthyroid with normal TSH of 0.98.  She is asymptomatic.  Recommend to follow-up in 1 year with thyroid ultrasound.  Is accompanied with her son.            Wander White MD FACE.    Much of the above report is an electronic transcription/translation of the spoken language to printed text using Dragon Software. As such, the subtleties and finesse of the spoken language may permit erroneous, or at times, nonsensical words or phrases to be inadvertently transcribed; thus changes may be made at a later date to rectify these errors.

## 2020-06-05 ENCOUNTER — OFFICE VISIT (OUTPATIENT)
Dept: CARDIOLOGY | Facility: CLINIC | Age: 85
End: 2020-06-05

## 2020-06-05 VITALS
HEART RATE: 73 BPM | HEIGHT: 66 IN | BODY MASS INDEX: 24.11 KG/M2 | DIASTOLIC BLOOD PRESSURE: 79 MMHG | SYSTOLIC BLOOD PRESSURE: 134 MMHG | OXYGEN SATURATION: 95 % | WEIGHT: 150 LBS

## 2020-06-05 DIAGNOSIS — I25.10 CAD S/P PERCUTANEOUS CORONARY ANGIOPLASTY: ICD-10-CM

## 2020-06-05 DIAGNOSIS — E78.5 DYSLIPIDEMIA: ICD-10-CM

## 2020-06-05 DIAGNOSIS — I31.8 PERICARDIAL MASS: ICD-10-CM

## 2020-06-05 DIAGNOSIS — Z98.61 CAD S/P PERCUTANEOUS CORONARY ANGIOPLASTY: ICD-10-CM

## 2020-06-05 DIAGNOSIS — I20.9 ANGINA PECTORIS (HCC): Primary | ICD-10-CM

## 2020-06-05 DIAGNOSIS — I10 ESSENTIAL HYPERTENSION: ICD-10-CM

## 2020-06-05 PROCEDURE — 93000 ELECTROCARDIOGRAM COMPLETE: CPT | Performed by: INTERNAL MEDICINE

## 2020-06-05 PROCEDURE — 99214 OFFICE O/P EST MOD 30 MIN: CPT | Performed by: INTERNAL MEDICINE

## 2020-06-05 RX ORDER — ASPIRIN 81 MG/1
81 TABLET ORAL DAILY
COMMUNITY
End: 2020-06-08 | Stop reason: SDUPTHER

## 2020-06-05 NOTE — PROGRESS NOTES
Subjective:     Encounter Date:2020      Patient ID: Flori Tubbs is a 88 y.o. female.    Chief Complaint : Follow-up for CAD, PCI, dyslipidemia, hypertension  History of Present Illness         This is an 86-year-old with  PMH of    #. CAD,  NSTEMI and COLLEEN with 2.75 x 15 Xience to RCA 2015  #.  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 hypertension, dyslipidemia  #. h/o CVA,  vocal cord paralysis  #. Cholecystectomy, breast biopsy and   #. allergy/intolerance to epinephrine     here  here for follow-up.  Patient is complaining of intermittent substernal chest pain with no aggravating or relieving factors.,  Short-lived.  Has chronic fatigue and shortness of breath., denies lightheadedness dizziness loss of consciousness.  Patient reportedly ran out of medications and has not refilled her cardiac meds.  Patient's arterial blood pressure is 134/79, heart rate 73, O2 sat of 95% on room air.  Patient had labs done 2019 which showed cholesterol 218 HDL 53  BUN creatinine of 26/1.3.  Labs from 2019 reveal CMP with a BUN/creatinine of 19/1.2.  BNP is normal at 43.,  Labs from 2019 reveal TSH of 0.98.     ASSESSMENT:  #  chest pain  #. Low TSH  #  anemia  # blood loss anemia, cecal mass, status post right hemicolectomy 2018  #  CAD, history of non ST elevation MI, PCI  #  dyslipidemia  # . pericardial mass, thymoma  #    hypertension  #. history of CVA.     PLAN:  Reviewed EKG and labs with patient  Advised stress test for chest pain patient is refusing understands risk benefits alternatives.  Advised to follow-up with endocrinology for low TSH.  Will check labs before next visit.  Will restart aspirin atorvastatin and metoprolol risk benefits alternatives explained  Continue conservative management.  Advised patient to take over-the-counter baby aspirin.  Continue as needed nitroglycerin  Fish oil due to advanced age   continue medical management,risk benefits alternatives  explained   Will followup in 6 months or sooner if needed.        Assessment:          Diagnosis Plan   1. Angina pectoris (CMS/HCC)  CK    Comprehensive Metabolic Panel    Lipid Panel   2. CAD S/P percutaneous coronary angioplasty  CK    Comprehensive Metabolic Panel    Lipid Panel   3. Dyslipidemia  CK    Comprehensive Metabolic Panel    Lipid Panel   4. Pericardial mass  CK    Comprehensive Metabolic Panel    Lipid Panel   5. Essential hypertension  CK    Comprehensive Metabolic Panel    Lipid Panel          Plan:         Past Medical History:  Past Medical History:   Diagnosis Date   • CAD (coronary artery disease)    • CVA (cerebral vascular accident) (CMS/HCC)    • Hypertension    • Palpitation    • ST elevation    • TIA (transient ischemic attack)      Past Surgical History:  Past Surgical History:   Procedure Laterality Date   • CATARACT EXTRACTION     •  SECTION     • COLON SURGERY  2018   • MASS EXCISION  colon      Allergies:  Allergies   Allergen Reactions   • Epinephrine Unknown (See Comments)   • Sulfamethoxazole-Trimethoprim Rash     Home Meds:  Current Meds:     Current Outpatient Medications:   •  aspirin 81 MG EC tablet, Take 1 tablet by mouth Daily., Disp: 90 tablet, Rfl: 3  •  bimatoprost (LUMIGAN) 0.01 % ophthalmic drops, LUMIGAN 0.01 % SOLN, Disp: , Rfl:   •  Ferrous Gluconate (IRON 27 PO), Take  by mouth., Disp: , Rfl:   •  MULTIPLE VITAMIN PO, Take 1 tablet by mouth Daily., Disp: , Rfl:   •  nitroglycerin (NITROSTAT) 0.4 MG SL tablet, NITROSTAT 0.4 MG SUBL, Disp: , Rfl:   •  RHOPRESSA 0.02 % solution, , Disp: , Rfl:   •  atorvastatin (LIPITOR) 10 MG tablet, Take 1 tablet by mouth Daily., Disp: 90 tablet, Rfl: 3  •  metoprolol succinate XL (TOPROL-XL) 25 MG 24 hr tablet, Take 1 tablet by mouth Daily., Disp: 90 tablet, Rfl: 3  Social History:   Social History     Tobacco Use   • Smoking status: Never Smoker   • Smokeless tobacco: Never Used   Substance Use Topics   • Alcohol use: No  "    Frequency: Never      Family History:  Family History   Problem Relation Age of Onset   • Cancer Mother    • Cancer Father    • Cancer Sister         The following portions of the patient's history were reviewed and updated as appropriate: allergies, current medications, past family history, past medical history, past social history, past surgical history and problem list.      Review of Systems   Constitution: Positive for malaise/fatigue. Negative for fever.   HENT: Negative for congestion and hearing loss.    Eyes: Negative for double vision and visual disturbance.   Cardiovascular: Positive for chest pain. Negative for claudication, dyspnea on exertion, leg swelling and syncope.   Respiratory: Positive for shortness of breath. Negative for cough.    Endocrine: Negative for cold intolerance.   Skin: Negative for color change and rash.   Musculoskeletal: Negative for arthritis and joint pain.   Gastrointestinal: Negative for abdominal pain and heartburn.   Genitourinary: Negative for hematuria.   Neurological: Negative for excessive daytime sleepiness and dizziness.   Psychiatric/Behavioral: Negative for depression. The patient is not nervous/anxious.    All other systems reviewed and are negative.    Comprehensive review of systems were reviewed and all others review of systems were found to be negative other than HPI      ECG 12 Lead  Date/Time: 6/5/2020 12:11 PM  Performed by: Taurus Robles MD  Authorized by: Taurus Robles MD   Comparison: compared with previous ECG from 8/5/2018  Comparison to previous ECG: EKG done today reviewed by me shows sinus rhythm at the rate of 67 bpm with first-degree AV block and Q waves in V1 V2 V3 Sastry of anterior MI, no new change compared to EKG from 8/5/2018                 Objective:     Physical Exam  /79   Pulse 73   Ht 167.6 cm (66\")   Wt 68 kg (150 lb)   SpO2 95%   BMI 24.21 kg/m²   General:  Appears in no acute distress  Eyes: " Sclera is anicteric,  conjunctiva is clear   HEENT:  No JVD. Thyroid not visibly enlarged. No mucosal pallor or cyanosis  Respiratory: Respirations regular and unlabored at rest.  Bilaterally good breath sounds, with good air entry in all fields. No crackles, rubs or wheezes auscultated  Cardiovascular: S1,S2 Regular rate and rhythm. No murmur, rub or gallop auscultated. No pretibial pitting edema  Gastrointestinal: Abdomen soft, flat, non tender. Bowel sounds present.   Musculoskeletal:  No abnormal movements  Extremities: No digital clubbing or cyanosis  Skin: Color pink. Skin warm and dry to touch. No rashes  No xanthoma  Neuro: Alert and awake, no lateralizing deficits appreciated    Lab Reviewed:

## 2020-06-08 RX ORDER — ASPIRIN 81 MG/1
81 TABLET ORAL DAILY
Qty: 90 TABLET | Refills: 3 | Status: SHIPPED | OUTPATIENT
Start: 2020-06-08 | End: 2021-07-28

## 2020-06-08 RX ORDER — METOPROLOL SUCCINATE 25 MG/1
25 TABLET, EXTENDED RELEASE ORAL DAILY
Qty: 90 TABLET | Refills: 3 | Status: SHIPPED | OUTPATIENT
Start: 2020-06-08 | End: 2021-01-04 | Stop reason: SDUPTHER

## 2020-06-08 RX ORDER — ATORVASTATIN CALCIUM 10 MG/1
10 TABLET, FILM COATED ORAL DAILY
Qty: 90 TABLET | Refills: 3 | Status: SHIPPED | OUTPATIENT
Start: 2020-06-08 | End: 2021-07-19

## 2020-07-09 ENCOUNTER — LAB (OUTPATIENT)
Dept: FAMILY MEDICINE CLINIC | Facility: CLINIC | Age: 85
End: 2020-07-09

## 2020-07-09 ENCOUNTER — OFFICE VISIT (OUTPATIENT)
Dept: FAMILY MEDICINE CLINIC | Facility: CLINIC | Age: 85
End: 2020-07-09

## 2020-07-09 VITALS
BODY MASS INDEX: 23.63 KG/M2 | HEART RATE: 67 BPM | HEIGHT: 66 IN | DIASTOLIC BLOOD PRESSURE: 73 MMHG | TEMPERATURE: 97.8 F | WEIGHT: 147 LBS | OXYGEN SATURATION: 95 % | SYSTOLIC BLOOD PRESSURE: 124 MMHG

## 2020-07-09 DIAGNOSIS — R53.82 CHRONIC FATIGUE: ICD-10-CM

## 2020-07-09 DIAGNOSIS — R53.1 WEAKNESS: Primary | ICD-10-CM

## 2020-07-09 DIAGNOSIS — F41.9 ANXIETY: ICD-10-CM

## 2020-07-09 PROCEDURE — 99213 OFFICE O/P EST LOW 20 MIN: CPT | Performed by: FAMILY MEDICINE

## 2020-07-09 PROCEDURE — 82607 VITAMIN B-12: CPT | Performed by: FAMILY MEDICINE

## 2020-07-09 PROCEDURE — 36415 COLL VENOUS BLD VENIPUNCTURE: CPT | Performed by: FAMILY MEDICINE

## 2020-07-09 PROCEDURE — 85025 COMPLETE CBC W/AUTO DIFF WBC: CPT | Performed by: FAMILY MEDICINE

## 2020-07-09 RX ORDER — TRAZODONE HYDROCHLORIDE 50 MG/1
50 TABLET ORAL NIGHTLY
Qty: 30 TABLET | Refills: 1 | Status: SHIPPED | OUTPATIENT
Start: 2020-07-09 | End: 2020-09-24

## 2020-07-09 RX ORDER — BRIMONIDINE TARTRATE/TIMOLOL 0.2%-0.5%
1 DROPS OPHTHALMIC (EYE) 2 TIMES DAILY
COMMUNITY
Start: 2020-06-18 | End: 2021-10-11

## 2020-07-09 NOTE — PROGRESS NOTES
Subjective   Flori Tubbs is a 88 y.o. female.     The patient present with the daughter with complaint of generalized weakness, fatigue and confusion.  The patient has  moved to individual assisted living in January since then family has noted more confusion.  She denies fever, dysuria, nausea, vomiting, abdominal pain, shortness of breath and chest pain.  Patient  state she eats regular meals but thinks does not drink enough water to stay hydrate.    Anxiety   Presents for initial visit. Onset was 6 to 12 months ago. Symptoms include confusion, decreased concentration, insomnia, malaise and nervous/anxious behavior. Patient reports no chest pain, depressed mood, excessive worry, nausea, panic, shortness of breath or suicidal ideas. Symptoms occur most days. The severity of symptoms is mild. The quality of sleep is fair.            The following portions of the patient's history were reviewed and updated as appropriate: past medical history, past social history, past surgical history and problem list.    Review of Systems   Constitutional: Positive for fatigue. Negative for fever.   HENT: Negative for trouble swallowing.    Respiratory: Negative for shortness of breath.    Cardiovascular: Negative for chest pain.   Gastrointestinal: Negative for abdominal pain, nausea and vomiting.   Genitourinary: Negative for difficulty urinating, dysuria, flank pain and frequency.   Neurological: Positive for confusion.   Psychiatric/Behavioral: Positive for decreased concentration and sleep disturbance. Negative for agitation, suicidal ideas and depressed mood. The patient is nervous/anxious and has insomnia.        Objective   Physical Exam   Constitutional: She is oriented to person, place, and time. She appears well-developed. No distress.   Pulmonary/Chest: Effort normal and breath sounds normal.   Abdominal: Soft. Bowel sounds are normal. There is no tenderness.   Neurological: She is alert and oriented to person, place,  and time.   Psychiatric: Her speech is normal and behavior is normal. Her mood appears anxious. Cognition and memory are impaired. She does not exhibit a depressed mood.   Vitals reviewed.    Vitals:    07/09/20 1311   BP: 124/73   Pulse: 67   Temp: 97.8 °F (36.6 °C)   SpO2: 95%     Current Outpatient Medications on File Prior to Visit   Medication Sig Dispense Refill   • COMBIGAN 0.2-0.5 % ophthalmic solution Administer 1 drop to both eyes 2 (Two) Times a Day.     • aspirin 81 MG EC tablet Take 1 tablet by mouth Daily. 90 tablet 3   • atorvastatin (LIPITOR) 10 MG tablet Take 1 tablet by mouth Daily. 90 tablet 3   • bimatoprost (LUMIGAN) 0.01 % ophthalmic drops LUMIGAN 0.01 % SOLN     • Ferrous Gluconate (IRON 27 PO) Take  by mouth.     • metoprolol succinate XL (TOPROL-XL) 25 MG 24 hr tablet Take 1 tablet by mouth Daily. 90 tablet 3   • MULTIPLE VITAMIN PO Take 1 tablet by mouth Daily.     • nitroglycerin (NITROSTAT) 0.4 MG SL tablet NITROSTAT 0.4 MG SUBL     • RHOPRESSA 0.02 % solution        No current facility-administered medications on file prior to visit.            Assessment/Plan   Problems Addressed this Visit        Other    Fatigue    Relevant Orders    Vitamin B12    CBC w AUTO Differential    CBC Auto Differential    Weakness - Primary     We will check a CBC and B12 level, also would like to check a UA due to increased weakness and confusion but patient unable to leave urine specimen.         Relevant Orders    POCT urinalysis dipstick, automated    Vitamin B12    CBC w AUTO Differential    CBC Auto Differential    Anxiety     Discussed anxiety medicine and behavioral modification.  Rx trazodone.

## 2020-07-09 NOTE — ASSESSMENT & PLAN NOTE
We will check a CBC and B12 level, also would like to check a UA due to increased weakness and confusion but patient unable to leave urine specimen.

## 2020-07-10 LAB
BASOPHILS # BLD AUTO: 0.04 10*3/MM3 (ref 0–0.2)
BASOPHILS NFR BLD AUTO: 0.6 % (ref 0–1.5)
DEPRECATED RDW RBC AUTO: 41.3 FL (ref 37–54)
EOSINOPHIL # BLD AUTO: 1.72 10*3/MM3 (ref 0–0.4)
EOSINOPHIL NFR BLD AUTO: 24.3 % (ref 0.3–6.2)
ERYTHROCYTE [DISTWIDTH] IN BLOOD BY AUTOMATED COUNT: 12.2 % (ref 12.3–15.4)
HCT VFR BLD AUTO: 39 % (ref 34–46.6)
HGB BLD-MCNC: 13.1 G/DL (ref 12–15.9)
IMM GRANULOCYTES # BLD AUTO: 0.01 10*3/MM3 (ref 0–0.05)
IMM GRANULOCYTES NFR BLD AUTO: 0.1 % (ref 0–0.5)
LYMPHOCYTES # BLD AUTO: 1.89 10*3/MM3 (ref 0.7–3.1)
LYMPHOCYTES NFR BLD AUTO: 26.7 % (ref 19.6–45.3)
MCH RBC QN AUTO: 30.9 PG (ref 26.6–33)
MCHC RBC AUTO-ENTMCNC: 33.6 G/DL (ref 31.5–35.7)
MCV RBC AUTO: 92 FL (ref 79–97)
MONOCYTES # BLD AUTO: 0.5 10*3/MM3 (ref 0.1–0.9)
MONOCYTES NFR BLD AUTO: 7.1 % (ref 5–12)
NEUTROPHILS NFR BLD AUTO: 2.92 10*3/MM3 (ref 1.7–7)
NEUTROPHILS NFR BLD AUTO: 41.2 % (ref 42.7–76)
NRBC BLD AUTO-RTO: 0 /100 WBC (ref 0–0.2)
PLATELET # BLD AUTO: 200 10*3/MM3 (ref 140–450)
PMV BLD AUTO: 10.5 FL (ref 6–12)
RBC # BLD AUTO: 4.24 10*6/MM3 (ref 3.77–5.28)
VIT B12 BLD-MCNC: 519 PG/ML (ref 211–946)
WBC # BLD AUTO: 7.08 10*3/MM3 (ref 3.4–10.8)

## 2020-07-20 ENCOUNTER — LAB (OUTPATIENT)
Dept: FAMILY MEDICINE CLINIC | Facility: CLINIC | Age: 85
End: 2020-07-20

## 2020-07-20 ENCOUNTER — OFFICE VISIT (OUTPATIENT)
Dept: FAMILY MEDICINE CLINIC | Facility: CLINIC | Age: 85
End: 2020-07-20

## 2020-07-20 VITALS
WEIGHT: 145 LBS | BODY MASS INDEX: 23.3 KG/M2 | TEMPERATURE: 96.3 F | HEIGHT: 66 IN | DIASTOLIC BLOOD PRESSURE: 74 MMHG | OXYGEN SATURATION: 95 % | HEART RATE: 71 BPM | SYSTOLIC BLOOD PRESSURE: 119 MMHG

## 2020-07-20 DIAGNOSIS — Z00.00 MEDICARE ANNUAL WELLNESS VISIT, SUBSEQUENT: Primary | ICD-10-CM

## 2020-07-20 DIAGNOSIS — R35.0 URINARY FREQUENCY: ICD-10-CM

## 2020-07-20 DIAGNOSIS — Z00.00 MEDICARE ANNUAL WELLNESS VISIT, SUBSEQUENT: ICD-10-CM

## 2020-07-20 LAB
ALBUMIN SERPL-MCNC: 4.1 G/DL (ref 3.5–5.2)
ALBUMIN/GLOB SERPL: 1.5 G/DL
ALP SERPL-CCNC: 42 U/L (ref 39–117)
ALT SERPL W P-5'-P-CCNC: 12 U/L (ref 1–33)
ANION GAP SERPL CALCULATED.3IONS-SCNC: 6.9 MMOL/L (ref 5–15)
AST SERPL-CCNC: 15 U/L (ref 1–32)
BILIRUB BLD-MCNC: NEGATIVE MG/DL
BILIRUB SERPL-MCNC: 0.5 MG/DL (ref 0–1.2)
BUN SERPL-MCNC: 16 MG/DL (ref 8–23)
BUN/CREAT SERPL: 15.2 (ref 7–25)
CALCIUM SPEC-SCNC: 9.8 MG/DL (ref 8.6–10.5)
CHLORIDE SERPL-SCNC: 105 MMOL/L (ref 98–107)
CHOLEST SERPL-MCNC: 168 MG/DL (ref 0–200)
CLARITY, POC: CLEAR
CO2 SERPL-SCNC: 27.1 MMOL/L (ref 22–29)
COLOR UR: YELLOW
CREAT SERPL-MCNC: 1.05 MG/DL (ref 0.57–1)
GFR SERPL CREATININE-BSD FRML MDRD: 49 ML/MIN/1.73
GLOBULIN UR ELPH-MCNC: 2.7 GM/DL
GLUCOSE SERPL-MCNC: 101 MG/DL (ref 65–99)
GLUCOSE UR STRIP-MCNC: NEGATIVE MG/DL
HDLC SERPL-MCNC: 50 MG/DL (ref 40–60)
KETONES UR QL: NEGATIVE
LDLC SERPL CALC-MCNC: 90 MG/DL (ref 0–100)
LDLC/HDLC SERPL: 1.8 {RATIO}
LEUKOCYTE EST, POC: ABNORMAL
NITRITE UR-MCNC: NEGATIVE MG/ML
PH UR: 5.5 [PH] (ref 5–8)
POTASSIUM SERPL-SCNC: 4.6 MMOL/L (ref 3.5–5.2)
PROT SERPL-MCNC: 6.8 G/DL (ref 6–8.5)
PROT UR STRIP-MCNC: NEGATIVE MG/DL
RBC # UR STRIP: ABNORMAL /UL
SODIUM SERPL-SCNC: 139 MMOL/L (ref 136–145)
SP GR UR: 1.03 (ref 1–1.03)
TRIGL SERPL-MCNC: 140 MG/DL (ref 0–150)
TSH SERPL DL<=0.05 MIU/L-ACNC: 1.2 UIU/ML (ref 0.27–4.2)
UROBILINOGEN UR QL: NORMAL
VLDLC SERPL-MCNC: 28 MG/DL (ref 5–40)

## 2020-07-20 PROCEDURE — 80061 LIPID PANEL: CPT | Performed by: FAMILY MEDICINE

## 2020-07-20 PROCEDURE — 81003 URINALYSIS AUTO W/O SCOPE: CPT | Performed by: FAMILY MEDICINE

## 2020-07-20 PROCEDURE — G0439 PPPS, SUBSEQ VISIT: HCPCS | Performed by: FAMILY MEDICINE

## 2020-07-20 PROCEDURE — 36415 COLL VENOUS BLD VENIPUNCTURE: CPT

## 2020-07-20 PROCEDURE — 84443 ASSAY THYROID STIM HORMONE: CPT | Performed by: FAMILY MEDICINE

## 2020-07-20 PROCEDURE — 96160 PT-FOCUSED HLTH RISK ASSMT: CPT | Performed by: FAMILY MEDICINE

## 2020-07-20 PROCEDURE — 87086 URINE CULTURE/COLONY COUNT: CPT | Performed by: FAMILY MEDICINE

## 2020-07-20 PROCEDURE — 80053 COMPREHEN METABOLIC PANEL: CPT | Performed by: FAMILY MEDICINE

## 2020-07-20 NOTE — PROGRESS NOTES
The ABCs of the Annual Wellness Visit  Subsequent Medicare Wellness Visit    Chief Complaint   Patient presents with   • Medicare Wellness-subsequent       Subjective   History of Present Illness:  Flori Tubbs is a 88 y.o. female who presents for a Subsequent Medicare Wellness Visit.    HEALTH RISK ASSESSMENT    Recent Hospitalizations:  No hospitalization(s) within the last year.    Current Medical Providers:  Patient Care Team:  Umberto White MD as PCP - General  Umberto White MD as PCP - Family Medicine  Taurus Robles MD as Consulting Physician (Cardiology)    Smoking Status:  Social History     Tobacco Use   Smoking Status Never Smoker   Smokeless Tobacco Never Used       Alcohol Consumption:  Social History     Substance and Sexual Activity   Alcohol Use No   • Frequency: Never       Depression Screen:   PHQ-2/PHQ-9 Depression Screening 7/20/2020   Little interest or pleasure in doing things 0   Feeling down, depressed, or hopeless 0   Total Score 0       Fall Risk Screen:  MILKA Fall Risk Assessment was completed, and patient is at LOW risk for falls.Assessment completed on:7/20/2020    Health Habits and Functional and Cognitive Screening:  Functional & Cognitive Status 7/21/2020   Do you have difficulty preparing food and eating? Yes   Do you have difficulty bathing yourself, getting dressed or grooming yourself? No   Do you have difficulty using the toilet? No   Do you have difficulty moving around from place to place? Yes   Do you have trouble with steps or getting out of a bed or a chair? No   Do you need help using the phone?  No   Are you deaf or do you have serious difficulty hearing?  Yes   Do you need help with transportation? No   Do you need help shopping? Yes   Do you need help preparing meals?  Yes   Do you need help with housework?  Yes   Do you need help taking your medications? No   Do you need help managing money? No   Do you ever drive or ride in a car without wearing a  seat belt? No   Do you have difficulty concentrating, remembering or making decisions? Yes         Does the patient have evidence of cognitive impairment? No    Asprin use counseling:Taking ASA appropriately as indicated    Age-appropriate Screening Schedule:  Refer to the list below for future screening recommendations based on patient's age, sex and/or medical conditions. Orders for these recommended tests are listed in the plan section. The patient has been provided with a written plan.    Health Maintenance   Topic Date Due   • TDAP/TD VACCINES (1 - Tdap) 06/02/1943   • ZOSTER VACCINE (1 of 2) 07/21/2020 (Originally 6/2/1982)   • INFLUENZA VACCINE  08/01/2020          The following portions of the patient's history were reviewed and updated as appropriate: past social history, past surgical history and problem list.    Outpatient Medications Prior to Visit   Medication Sig Dispense Refill   • aspirin 81 MG EC tablet Take 1 tablet by mouth Daily. 90 tablet 3   • atorvastatin (LIPITOR) 10 MG tablet Take 1 tablet by mouth Daily. 90 tablet 3   • bimatoprost (LUMIGAN) 0.01 % ophthalmic drops LUMIGAN 0.01 % SOLN     • COMBIGAN 0.2-0.5 % ophthalmic solution Administer 1 drop to both eyes 2 (Two) Times a Day.     • Ferrous Gluconate (IRON 27 PO) Take  by mouth.     • metoprolol succinate XL (TOPROL-XL) 25 MG 24 hr tablet Take 1 tablet by mouth Daily. 90 tablet 3   • MULTIPLE VITAMIN PO Take 1 tablet by mouth Daily.     • nitroglycerin (NITROSTAT) 0.4 MG SL tablet NITROSTAT 0.4 MG SUBL     • RHOPRESSA 0.02 % solution      • traZODone (DESYREL) 50 MG tablet Take 1 tablet by mouth Every Night. 30 tablet 1     No facility-administered medications prior to visit.        Patient Active Problem List   Diagnosis   • Chronic pain of both knees   • Nausea   • Acute nontraumatic kidney injury (CMS/HCC)   • Anaclitic depression   • Anemia, iron deficiency   • Arthritis   • Asthma   • Atypical chest pain   • Candidiasis of  urogenital site   • Cellulitis   • Diverticulitis   • Chronic coronary artery disease   • Colon cancer, ascending (CMS/HCC)   • Complete uterovaginal prolapse   • Uterine prolapse   • Dyslipidemia   • Dysphagia   • Dyspnea on exertion   • Dyspnea   • Elevated blood pressure reading without diagnosis of hypertension   • Encounter for long-term (current) use of other medications   • Encounter for routine gynecological examination   • Encounter for general adult medical examination without abnormal findings   • Fatigue   • Weakness   • Glaucoma   • History of prosthetic heart valve   • Hyperkalemia   • Hypertension   • Hypotension   • Localized infection of skin   • Mediastinal mass   • Multinodular goiter   • Myalgia   • Other screening mammogram   • Palpitations   • Panic attack   • Postnasal drip   • Sebaceous cyst   • Skin disorder   • ST elevation (STEMI) myocardial infarction (CMS/HCC)   • Cerebrovascular accident (CVA) (CMS/[facility])   • Transient ischemic attack   • Urge incontinence   • Vocal cord paralysis   • Pain in joint involving lower leg   • Anxiety   • Medicare annual wellness visit, subsequent       Advanced Care Planning:  ACP discussion was held with the patient during this visit. Patient has an advance directive (not in EMR), copy requested.    Review of Systems   Constitutional: Negative for appetite change and fatigue.   HENT: Negative for trouble swallowing.    Eyes: Negative for visual disturbance.   Respiratory: Negative for shortness of breath and wheezing.    Cardiovascular: Negative for chest pain.   Gastrointestinal: Negative for abdominal pain.   Endocrine: Negative for polydipsia and polyuria.   Genitourinary: Positive for frequency.   Neurological: Negative for dizziness, syncope and headaches.   Psychiatric/Behavioral: Negative for sleep disturbance. The patient is not nervous/anxious.        Compared to one year ago, the patient feels her physical health is the same.  Compared to one year  "ago, the patient feels her mental health is the same.    Reviewed chart for potential of high risk medication in the elderly: yes  Reviewed chart for potential of harmful drug interactions in the elderly:yes    Objective         Vitals:    07/20/20 0955   BP: 119/74   BP Location: Left arm   Patient Position: Sitting   Cuff Size: Adult   Pulse: 71   Temp: 96.3 °F (35.7 °C)   TempSrc: Temporal   SpO2: 95%   Weight: 65.8 kg (145 lb)   Height: 167.6 cm (66\")       Body mass index is 23.4 kg/m².  Discussed the patient's BMI with her. The BMI is in the acceptable range.    Physical Exam   Constitutional: She is oriented to person, place, and time. No distress.   Cardiovascular: Normal heart sounds.   Pulmonary/Chest: Effort normal and breath sounds normal.   Neurological: She is alert and oriented to person, place, and time.   Psychiatric: She has a normal mood and affect.   Vitals reviewed.      Lab Results   Component Value Date    TRIG 140 07/20/2020    HDL 50 07/20/2020    LDL 90 07/20/2020    VLDL 28 07/20/2020        Assessment/Plan   Medicare Risks and Personalized Health Plan  CMS Preventative Services Quick Reference  Fall Risk  Immunizations Discussed/Encouraged (specific immunizations; Td, Pneumococcal 23 and Shingrix )    The above risks/problems have been discussed with the patient.  Pertinent information has been shared with the patient in the After Visit Summary.  Follow up plans and orders are seen below in the Assessment/Plan Section.    Diagnoses and all orders for this visit:    1. Medicare annual wellness visit, subsequent (Primary)  Assessment & Plan:  Discussed preventive care protocol and  importance of regular exercise and recommend starting or continuing a regular exercise program for good health.  Annual flu shots are recommended every year in the fall.  Patient declined for pneumonia and Tdap.        Orders:  -     Comprehensive metabolic panel; Future  -     Lipid panel; Future  -     TSH; " Future    2. Urinary frequency  -     POCT urinalysis dipstick, automated  -     Urine Culture - Urine, Urine, Clean Catch    Follow Up:  Return in about 6 months (around 1/20/2021) for Recheck.     An After Visit Summary and PPPS were given to the patient.

## 2020-07-20 NOTE — PATIENT INSTRUCTIONS
Fall Prevention in the Home, Adult  Falls can cause injuries. They can happen to people of all ages. There are many things you can do to make your home safe and to help prevent falls. Ask for help when making these changes, if needed.  What actions can I take to prevent falls?  General Instructions  · Use good lighting in all rooms. Replace any light bulbs that burn out.  · Turn on the lights when you go into a dark area. Use night-lights.  · Keep items that you use often in easy-to-reach places. Lower the shelves around your home if necessary.  · Set up your furniture so you have a clear path. Avoid moving your furniture around.  · Do not have throw rugs and other things on the floor that can make you trip.  · Avoid walking on wet floors.  · If any of your floors are uneven, fix them.  · Add color or contrast paint or tape to clearly tangela and help you see:  ? Any grab bars or handrails.  ? First and last steps of stairways.  ? Where the edge of each step is.  · If you use a stepladder:  ? Make sure that it is fully opened. Do not climb a closed stepladder.  ? Make sure that both sides of the stepladder are locked into place.  ? Ask someone to hold the stepladder for you while you use it.  · If there are any pets around you, be aware of where they are.  What can I do in the bathroom?         · Keep the floor dry. Clean up any water that spills onto the floor as soon as it happens.  · Remove soap buildup in the tub or shower regularly.  · Use non-skid mats or decals on the floor of the tub or shower.  · Attach bath mats securely with double-sided, non-slip rug tape.  · If you need to sit down in the shower, use a plastic, non-slip stool.  · Install grab bars by the toilet and in the tub and shower. Do not use towel bars as grab bars.  What can I do in the bedroom?  · Make sure that you have a light by your bed that is easy to reach.  · Do not use any sheets or blankets that are too big for your bed. They should not  hang down onto the floor.  · Have a firm chair that has side arms. You can use this for support while you get dressed.  What can I do in the kitchen?  · Clean up any spills right away.  · If you need to reach something above you, use a strong step stool that has a grab bar.  · Keep electrical cords out of the way.  · Do not use floor polish or wax that makes floors slippery. If you must use wax, use non-skid floor wax.  What can I do with my stairs?  · Do not leave any items on the stairs.  · Make sure that you have a light switch at the top of the stairs and the bottom of the stairs. If you do not have them, ask someone to add them for you.  · Make sure that there are handrails on both sides of the stairs, and use them. Fix handrails that are broken or loose. Make sure that handrails are as long as the stairways.  · Install non-slip stair treads on all stairs in your home.  · Avoid having throw rugs at the top or bottom of the stairs. If you do have throw rugs, attach them to the floor with carpet tape.  · Choose a carpet that does not hide the edge of the steps on the stairway.  · Check any carpeting to make sure that it is firmly attached to the stairs. Fix any carpet that is loose or worn.  What can I do on the outside of my home?  · Use bright outdoor lighting.  · Regularly fix the edges of walkways and driveways and fix any cracks.  · Remove anything that might make you trip as you walk through a door, such as a raised step or threshold.  · Trim any bushes or trees on the path to your home.  · Regularly check to see if handrails are loose or broken. Make sure that both sides of any steps have handrails.  · Install guardrails along the edges of any raised decks and porches.  · Clear walking paths of anything that might make someone trip, such as tools or rocks.  · Have any leaves, snow, or ice cleared regularly.  · Use sand or salt on walking paths during winter.  · Clean up any spills in your garage right  away. This includes grease or oil spills.  What other actions can I take?  · Wear shoes that:  ? Have a low heel. Do not wear high heels.  ? Have rubber bottoms.  ? Are comfortable and fit you well.  ? Are closed at the toe. Do not wear open-toe sandals.  · Use tools that help you move around (mobility aids) if they are needed. These include:  ? Canes.  ? Walkers.  ? Scooters.  ? Crutches.  · Review your medicines with your doctor. Some medicines can make you feel dizzy. This can increase your chance of falling.  Ask your doctor what other things you can do to help prevent falls.  Where to find more information  · Centers for Disease Control and Prevention, [place]: https://cdc.gov  · National [place] on Aging: https://ny0wpkk.nehal.nih.gov  Contact a doctor if:  · You are afraid of falling at home.  · You feel weak, drowsy, or dizzy at home.  · You fall at home.  Summary  · There are many simple things that you can do to make your home safe and to help prevent falls.  · Ways to make your home safe include removing tripping hazards and installing grab bars in the bathroom.  · Ask for help when making these changes in your home.  This information is not intended to replace advice given to you by your health care provider. Make sure you discuss any questions you have with your health care provider.  Document Released: 10/14/2010 Document Revised: 2020 Document Reviewed: 2018  [employer] Patient Education ©  [employer]      Medicare Wellness  Personal Prevention Plan of Service     Date of Office Visit:  2020  Encounter Provider:  Umberto White MD  Place of Service:  [facility]  Patient Name: Flori Tubbs  :  1932    As part of the Medicare Wellness portion of your visit today, we are providing you with this personalized preventive plan of services (PPPS). This plan is based upon recommendations of the [place] Preventive Services Task Force  (USPSTF) and the Advisory Committee on Immunization Practices (ACIP).    This lists the preventive care services that should be considered, and provides dates of when you are due. Items listed as completed are up-to-date and do not require any further intervention.    Health Maintenance   Topic Date Due   • TDAP/TD VACCINES (1 - Tdap) 1943   • ZOSTER VACCINE (1 of 2) 1982   • Pneumococcal Vaccine Once at 65 Years Old  1997   • MEDICARE ANNUAL WELLNESS  2019   • INFLUENZA VACCINE  2020       No orders of the defined types were placed in this encounter.      No follow-ups on file.          Medicare Wellness  Personal Prevention Plan of Service     Date of Office Visit:  2020  Encounter Provider:  Umberto White MD  Place of Service:  [facility]  Patient Name: Flori Tubbs  :  1932    As part of the Medicare Wellness portion of your visit today, we are providing you with this personalized preventive plan of services (PPPS). This plan is based upon recommendations of the [place] Preventive Services Task Force (USPSTF) and the Advisory Committee on Immunization Practices (ACIP).    This lists the preventive care services that should be considered, and provides dates of when you are due. Items listed as completed are up-to-date and do not require any further intervention.    Health Maintenance   Topic Date Due   • TDAP/TD VACCINES (1 - Tdap) 1943   • Pneumococcal Vaccine Once at 65 Years Old  1997   • ZOSTER VACCINE (1 of 2) 2020 (Originally 1982)   • INFLUENZA VACCINE  2020   • MEDICARE ANNUAL WELLNESS  2021       Orders Placed This Encounter   Procedures   • Urine Culture - Urine, Urine, Clean Catch   • Comprehensive metabolic panel     Standing Status:   Future     Number of Occurrences:   1     Standing Expiration Date:   2021   • Lipid panel     Standing Status:   Future     Number of  Occurrences:   1     Standing Expiration Date:   7/20/2021   • TSH     Standing Status:   Future     Number of Occurrences:   1     Standing Expiration Date:   7/20/2021   • POCT urinalysis dipstick, automated       Return in about 6 months (around 1/20/2021) for Recheck.          Advance Directive    Advance directives are legal documents that let you make choices ahead of time about your health care and medical treatment in case you become unable to communicate for yourself. Advance directives are a way for you to communicate your wishes to family, friends, and health care providers. This can help convey your decisions about end-of-life care if you become unable to communicate.  Discussing and writing advance directives should happen over time rather than all at once. Advance directives can be changed depending on your situation and what you want, even after you have signed the advance directives.  If you do not have an advance directive, some states assign family decision makers to act on your behalf based on how closely you are related to them. Each state has its own laws regarding advance directives. You may want to check with your health care provider, , or state representative about the laws in your state. There are different types of advance directives, such as:  · Medical power of .  · Living will.  · Do not resuscitate (DNR) or do not attempt resuscitation (DNAR) order.  Health care proxy and medical power of   A health care proxy, also called a health care agent, is a person who is appointed to make medical decisions for you in cases in which you are unable to make the decisions yourself. Generally, people choose someone they know well and trust to represent their preferences. Make sure to ask this person for an agreement to act as your proxy. A proxy may have to exercise judgment in the event of a medical decision for which your wishes are not known.  A medical power of   is a legal document that names your health care proxy. Depending on the laws in your state, after the document is written, it may also need to be:  · Signed.  · Notarized.  · Dated.  · Copied.  · Witnessed.  · Incorporated into your medical record.  You may also want to appoint someone to manage your financial affairs in a situation in which you are unable to do so. This is called a durable power of  for finances. It is a separate legal document from the durable power of  for health care. You may choose the same person or someone different from your health care proxy to act as your agent in financial matters.  If you do not appoint a proxy, or if there is a concern that the proxy is not acting in your best interests, a court-appointed guardian may be designated to act on your behalf.  Living will  A living will is a set of instructions documenting your wishes about medical care when you cannot express them yourself. Health care providers should keep a copy of your living will in your medical record. You may want to give a copy to family members or friends. To alert caregivers in case of an emergency, you can place a card in your wallet to let them know that you have a living will and where they can find it. A living will is used if you become:  · Terminally ill.  · Incapacitated.  · Unable to communicate or make decisions.  Items to consider in your living will include:  · The use or non-use of life-sustaining equipment, such as dialysis machines and breathing machines (ventilators).  · A DNR or DNAR order, which is the instruction not to use cardiopulmonary resuscitation (CPR) if breathing or heartbeat stops.  · The use or non-use of tube feeding.  · Withholding of food and fluids.  · Comfort (palliative) care when the goal becomes comfort rather than a cure.  · Organ and tissue donation.  A living will does not give instructions for distributing your money and property if you should pass away. It  is recommended that you seek the advice of a  when writing a will. Decisions about taxes, beneficiaries, and asset distribution will be legally binding. This process can relieve your family and friends of any concerns surrounding disputes or questions that may come up about the distribution of your assets.  DNR or DNAR  A DNR or DNAR order is a request not to have CPR in the event that your heart stops beating or you stop breathing. If a DNR or DNAR order has not been made and shared, a health care provider will try to help any patient whose heart has stopped or who has stopped breathing. If you plan to have surgery, talk with your health care provider about how your DNR or DNAR order will be followed if problems occur.  Summary  · Advance directives are the legal documents that allow you to make choices ahead of time about your health care and medical treatment in case you become unable to communicate for yourself.  · The process of discussing and writing advance directives should happen over time. You can change the advance directives, even after you have signed them.  · Advance directives include DNR or DNAR orders, living geiger, and designating an agent as your medical power of .  This information is not intended to replace advice given to you by your health care provider. Make sure you discuss any questions you have with your health care provider.  Document Released: 03/26/2009 Document Revised: 01/22/2020 Document Reviewed: 11/06/2017  [employer]vier Patient Education © 2020 [employer].      Fall Prevention in the Home, Adult  Falls can cause injuries. They can happen to people of all ages. There are many things you can do to make your home safe and to help prevent falls. Ask for help when making these changes, if needed.  What actions can I take to prevent falls?  General Instructions  · Use good lighting in all rooms. Replace any light bulbs that burn out.  · Turn on the lights when you go into a  dark area. Use night-lights.  · Keep items that you use often in easy-to-reach places. Lower the shelves around your home if necessary.  · Set up your furniture so you have a clear path. Avoid moving your furniture around.  · Do not have throw rugs and other things on the floor that can make you trip.  · Avoid walking on wet floors.  · If any of your floors are uneven, fix them.  · Add color or contrast paint or tape to clearly tangela and help you see:  ? Any grab bars or handrails.  ? First and last steps of stairways.  ? Where the edge of each step is.  · If you use a stepladder:  ? Make sure that it is fully opened. Do not climb a closed stepladder.  ? Make sure that both sides of the stepladder are locked into place.  ? Ask someone to hold the stepladder for you while you use it.  · If there are any pets around you, be aware of where they are.  What can I do in the bathroom?         · Keep the floor dry. Clean up any water that spills onto the floor as soon as it happens.  · Remove soap buildup in the tub or shower regularly.  · Use non-skid mats or decals on the floor of the tub or shower.  · Attach bath mats securely with double-sided, non-slip rug tape.  · If you need to sit down in the shower, use a plastic, non-slip stool.  · Install grab bars by the toilet and in the tub and shower. Do not use towel bars as grab bars.  What can I do in the bedroom?  · Make sure that you have a light by your bed that is easy to reach.  · Do not use any sheets or blankets that are too big for your bed. They should not hang down onto the floor.  · Have a firm chair that has side arms. You can use this for support while you get dressed.  What can I do in the kitchen?  · Clean up any spills right away.  · If you need to reach something above you, use a strong step stool that has a grab bar.  · Keep electrical cords out of the way.  · Do not use floor polish or wax that makes floors slippery. If you must use wax, use non-skid  floor wax.  What can I do with my stairs?  · Do not leave any items on the stairs.  · Make sure that you have a light switch at the top of the stairs and the bottom of the stairs. If you do not have them, ask someone to add them for you.  · Make sure that there are handrails on both sides of the stairs, and use them. Fix handrails that are broken or loose. Make sure that handrails are as long as the stairways.  · Install non-slip stair treads on all stairs in your home.  · Avoid having throw rugs at the top or bottom of the stairs. If you do have throw rugs, attach them to the floor with carpet tape.  · Choose a carpet that does not hide the edge of the steps on the stairway.  · Check any carpeting to make sure that it is firmly attached to the stairs. Fix any carpet that is loose or worn.  What can I do on the outside of my home?  · Use bright outdoor lighting.  · Regularly fix the edges of walkways and driveways and fix any cracks.  · Remove anything that might make you trip as you walk through a door, such as a raised step or threshold.  · Trim any bushes or trees on the path to your home.  · Regularly check to see if handrails are loose or broken. Make sure that both sides of any steps have handrails.  · Install guardrails along the edges of any raised decks and porches.  · Clear walking paths of anything that might make someone trip, such as tools or rocks.  · Have any leaves, snow, or ice cleared regularly.  · Use sand or salt on walking paths during winter.  · Clean up any spills in your garage right away. This includes grease or oil spills.  What other actions can I take?  · Wear shoes that:  ? Have a low heel. Do not wear high heels.  ? Have rubber bottoms.  ? Are comfortable and fit you well.  ? Are closed at the toe. Do not wear open-toe sandals.  · Use tools that help you move around (mobility aids) if they are needed. These include:  ? Canes.  ? Walkers.  ? Scooters.  ? Crutches.  · Review your  medicines with your doctor. Some medicines can make you feel dizzy. This can increase your chance of falling.  Ask your doctor what other things you can do to help prevent falls.  Where to find more information  · Centers for Disease Control and Prevention, [place]: https://cdc.gov  · National [place] on Aging: https://ng3pofi.nehal.nih.gov  Contact a doctor if:  · You are afraid of falling at home.  · You feel weak, drowsy, or dizzy at home.  · You fall at home.  Summary  · There are many simple things that you can do to make your home safe and to help prevent falls.  · Ways to make your home safe include removing tripping hazards and installing grab bars in the bathroom.  · Ask for help when making these changes in your home.  This information is not intended to replace advice given to you by your health care provider. Make sure you discuss any questions you have with your health care provider.  Document Released: 10/14/2010 Document Revised: 04/09/2020 Document Reviewed: 08/02/2018  Elsevier Patient Education © 2020 [employer] Inc.    Please check with your insurance and get Shingrix vaccination series at your local pharmacy

## 2020-07-21 LAB — BACTERIA SPEC AEROBE CULT: NORMAL

## 2020-07-21 NOTE — ASSESSMENT & PLAN NOTE
Discussed preventive care protocol and  importance of regular exercise and recommend starting or continuing a regular exercise program for good health.  Annual flu shots are recommended every year in the fall.  Patient declined for pneumonia and Tdap.

## 2020-08-18 ENCOUNTER — OFFICE VISIT (OUTPATIENT)
Dept: FAMILY MEDICINE CLINIC | Facility: CLINIC | Age: 85
End: 2020-08-18

## 2020-08-18 VITALS
OXYGEN SATURATION: 96 % | DIASTOLIC BLOOD PRESSURE: 90 MMHG | SYSTOLIC BLOOD PRESSURE: 168 MMHG | HEART RATE: 112 BPM | HEIGHT: 66 IN | WEIGHT: 147 LBS | BODY MASS INDEX: 23.63 KG/M2 | TEMPERATURE: 98 F

## 2020-08-18 DIAGNOSIS — R30.0 DYSURIA: Primary | ICD-10-CM

## 2020-08-18 DIAGNOSIS — N39.41 URGE INCONTINENCE OF URINE: ICD-10-CM

## 2020-08-18 DIAGNOSIS — L89.151 PRESSURE INJURY OF SACRAL REGION, STAGE 1: ICD-10-CM

## 2020-08-18 LAB
BILIRUB BLD-MCNC: NEGATIVE MG/DL
CLARITY, POC: CLEAR
COLOR UR: YELLOW
GLUCOSE UR STRIP-MCNC: NEGATIVE MG/DL
KETONES UR QL: NEGATIVE
LEUKOCYTE EST, POC: ABNORMAL
NITRITE UR-MCNC: NEGATIVE MG/ML
PH UR: 5.5 [PH] (ref 5–8)
PROT UR STRIP-MCNC: ABNORMAL MG/DL
RBC # UR STRIP: ABNORMAL /UL
SP GR UR: 1.02 (ref 1–1.03)
UROBILINOGEN UR QL: NORMAL

## 2020-08-18 PROCEDURE — 81003 URINALYSIS AUTO W/O SCOPE: CPT | Performed by: FAMILY MEDICINE

## 2020-08-18 PROCEDURE — 99213 OFFICE O/P EST LOW 20 MIN: CPT | Performed by: FAMILY MEDICINE

## 2020-08-18 RX ORDER — CEPHALEXIN 500 MG/1
500 CAPSULE ORAL 2 TIMES DAILY
Qty: 14 CAPSULE | Refills: 0 | Status: SHIPPED | OUTPATIENT
Start: 2020-08-18 | End: 2020-08-25

## 2020-08-18 NOTE — PROGRESS NOTES
Subjective   Flori Tubbs is a 88 y.o. female.     Urinary Frequency    This is a recurrent problem. The current episode started 1 to 4 weeks ago. The problem occurs intermittently. The problem has been gradually worsening. The quality of the pain is described as aching. The pain is mild. There has been no fever. Associated symptoms include frequency and urgency. Pertinent negatives include no chills, flank pain, hematuria, hesitancy, nausea or vomiting. Associated symptoms comments: Urinary incontinence and prolapse.    the patient also C/O bedsore at coccyx. She sleeps in recliner.     The following portions of the patient's history were reviewed and updated as appropriate: past medical history, past social history, past surgical history and problem list.    Review of Systems   Constitutional: Positive for fatigue. Negative for chills and fever.   Gastrointestinal: Negative for abdominal pain, nausea and vomiting.   Genitourinary: Positive for frequency and urgency. Negative for difficulty urinating, flank pain, hematuria and hesitancy.   Musculoskeletal: Positive for back pain.        Pressure sore at coccyx   Skin:        Pressure sore   Neurological: Negative for dizziness and headache.   Psychiatric/Behavioral: The patient is nervous/anxious.        Objective   Physical Exam   Constitutional: She is oriented to person, place, and time. No distress.   Pulmonary/Chest: Effort normal and breath sounds normal.   Abdominal: Soft. Bowel sounds are normal. There is no tenderness.   No flank tenderness.   Neurological: She is alert and oriented to person, place, and time.   Skin:   Pressure sore at coccyx, no redness, skin tear or discharge.   Vitals reviewed.    Vitals:    08/18/20 1341   BP: 168/90   Pulse: 112   Temp: 98 °F (36.7 °C)   SpO2: 96%     Current Outpatient Medications on File Prior to Visit   Medication Sig Dispense Refill   • aspirin 81 MG EC tablet Take 1 tablet by mouth Daily. 90 tablet 3   •  atorvastatin (LIPITOR) 10 MG tablet Take 1 tablet by mouth Daily. 90 tablet 3   • bimatoprost (LUMIGAN) 0.01 % ophthalmic drops LUMIGAN 0.01 % SOLN     • COMBIGAN 0.2-0.5 % ophthalmic solution Administer 1 drop to both eyes 2 (Two) Times a Day.     • Ferrous Gluconate (IRON 27 PO) Take  by mouth.     • metoprolol succinate XL (TOPROL-XL) 25 MG 24 hr tablet Take 1 tablet by mouth Daily. 90 tablet 3   • MULTIPLE VITAMIN PO Take 1 tablet by mouth Daily.     • nitroglycerin (NITROSTAT) 0.4 MG SL tablet NITROSTAT 0.4 MG SUBL     • RHOPRESSA 0.02 % solution      • traZODone (DESYREL) 50 MG tablet Take 1 tablet by mouth Every Night. 30 tablet 1     No current facility-administered medications on file prior to visit.            Assessment/Plan   Problems Addressed this Visit        Nervous and Auditory    Dysuria - Primary    Relevant Orders    POCT urinalysis dipstick, automated (Completed)       Musculoskeletal and Integument    Pressure injury of sacral region, stage 1     Advise frequent position change, keep pressure off and apply neosporin.            Genitourinary    Urge incontinence of urine

## 2020-08-19 PROBLEM — L89.151 PRESSURE INJURY OF SACRAL REGION, STAGE 1: Status: ACTIVE | Noted: 2020-08-19

## 2020-08-19 PROBLEM — N39.41 URGE INCONTINENCE OF URINE: Status: ACTIVE | Noted: 2020-08-19

## 2020-08-19 PROBLEM — R30.0 DYSURIA: Status: ACTIVE | Noted: 2020-08-19

## 2020-09-24 ENCOUNTER — OFFICE VISIT (OUTPATIENT)
Dept: ENDOCRINOLOGY | Facility: CLINIC | Age: 85
End: 2020-09-24

## 2020-09-24 VITALS
SYSTOLIC BLOOD PRESSURE: 140 MMHG | HEART RATE: 85 BPM | BODY MASS INDEX: 23.3 KG/M2 | WEIGHT: 145 LBS | TEMPERATURE: 97.5 F | DIASTOLIC BLOOD PRESSURE: 75 MMHG | OXYGEN SATURATION: 97 % | HEIGHT: 66 IN

## 2020-09-24 DIAGNOSIS — E04.2 MULTIPLE THYROID NODULES: Primary | ICD-10-CM

## 2020-09-24 PROCEDURE — 99212 OFFICE O/P EST SF 10 MIN: CPT | Performed by: INTERNAL MEDICINE

## 2020-09-24 RX ORDER — ERGOCALCIFEROL (VITAMIN D2) 50 MCG
2000 CAPSULE ORAL DAILY
Qty: 100 CAPSULE | Refills: 4 | Status: SHIPPED | OUTPATIENT
Start: 2020-09-24

## 2020-09-24 RX ORDER — MAGNESIUM 200 MG
1000 TABLET ORAL DAILY
Qty: 100 EACH | Refills: 4 | Status: SHIPPED | OUTPATIENT
Start: 2020-09-24

## 2020-09-24 NOTE — PROGRESS NOTES
Endocrine Progress Note Outpatient     Patient Care Team:  Umberto White MD as PCP - General  Umberto White MD as PCP - Family Medicine  Taurus Robles MD as Consulting Physician (Cardiology)    Chief Complaint: Thyroid nodules    HPI: 88-year-old female with history of multiple thyroid nodules is here for follow-up.  She has done biopsy of the right and estimates area nodule in the past which was benign.  She does have a history of thyroid cancer in the mother.  She is euthyroid not taking any medications.  No history of radiation exposure.  No trouble swallowing or choking.    Past Medical History:   Diagnosis Date   • CAD (coronary artery disease)    • CVA (cerebral vascular accident) (CMS/HCC)    • Hypertension    • Palpitation    • ST elevation    • TIA (transient ischemic attack)        Social History     Socioeconomic History   • Marital status:      Spouse name: Not on file   • Number of children: Not on file   • Years of education: Not on file   • Highest education level: Not on file   Tobacco Use   • Smoking status: Never Smoker   • Smokeless tobacco: Never Used   Substance and Sexual Activity   • Alcohol use: No     Frequency: Never   • Drug use: No   • Sexual activity: Defer       Family History   Problem Relation Age of Onset   • Cancer Mother    • Cancer Father    • Cancer Sister        Allergies   Allergen Reactions   • Epinephrine Unknown (See Comments)   • Sulfamethoxazole-Trimethoprim Rash       ROS:   Constitutional:  Denies fatigue, tiredness.    Eyes:  Denies change in visual acuity   HENT:  Denies nasal congestion or sore throat   Respiratory: denies cough, shortness of breath.   Cardiovascular:  denies chest pain, edema   GI:  Denies abdominal pain, nausea, vomiting.   Musculoskeletal:  Denies back pain or joint pain   Integument:  Denies dry skin and rash   Neurologic:  Denies headache, focal weakness or sensory changes   Endocrine:  Denies polyuria or polydipsia    Psychiatric:  Denies depression or anxiety      Vitals:    09/24/20 1052   BP: 140/75   Pulse: 85   Temp: 97.5 °F (36.4 °C)   SpO2: 97%       Physical Exam:  GEN: NAD, conversant  EYES: EOMI, PERRL, no conjunctival erythema  NECK: no thyromegaly, full ROM   CV: RRR, no murmurs/rubs/gallops, no peripheral edema  LUNG: CTAB, no wheezes/rales/ronchi  SKIN: no rashes, no acanthosis  MSK: no deformities, full ROM of all extremities  NEURO: no tremors, DTR normal  PSYCH: AOX3, appropriate mood, affect normal      Results Review:     I reviewed the patient's new clinical results.    No results found for: HGBA1C   Lab Results   Component Value Date    GLUCOSE 101 (H) 07/20/2020    BUN 16 07/20/2020    CREATININE 1.05 (H) 07/20/2020    EGFRIFNONA 49 (L) 07/20/2020    BCR 15.2 07/20/2020    K 4.6 07/20/2020    CO2 27.1 07/20/2020    CALCIUM 9.8 07/20/2020    ALBUMIN 4.10 07/20/2020    LABIL2 1.2 03/29/2019    AST 15 07/20/2020    ALT 12 07/20/2020    CHOL 168 07/20/2020    TRIG 140 07/20/2020    LDL 90 07/20/2020    HDL 50 07/20/2020     Lab Results   Component Value Date    TSH 1.200 07/20/2020     US Thyroid [[id number]] (Order [id number])   Order   Status: Final result   In Basket Actions     Reviewed  Result Note  View in In Basket   Appointment Information     PACS Images      Radiology Images   Study Result     DATE OF EXAM:  9/23/2019 6:10 PM     PROCEDURE:  US THYROID-     INDICATIONS:  GOITER; 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 History of multinodular  goiter     COMPARISON:  Ultrasound 08/30/2016     TECHNIQUE:   Grayscale and color and Doppler ultrasound imaging of the thyroid  performed according to routine thyroid ultrasound protocol, real time  with image documentation.      FINDINGS:  The right thyroid lobe measures up to 4.1 cm and the left thyroid lobe  measures up to 3.5 cm. The isthmus is 2 mm in thickness. The parenchyma  appears diffusely heterogeneous. There is normal vascularity. There is a  hypoechoic  partially cystic spongiform nodule seen within the superior  pole of the left thyroid lobe measuring up to 1.4 cm. There is a  hypoechoic ovoid nodule present near the isthmus extending to the right  thyroid lobe measuring up to 1.6 cm. There is a partially cystic  hypoechoic nodule present within the inferior pole measuring up to 1.3  cm. A partially cystic hypoechoic nodules within the inferior pole  measuring up to 1.2 cm.      IMPRESSION:  Findings consistent with multinodular goiter, similar as compared to the  previous study. Annual sonographic evaluation is recommended to evaluate  for stability given partially cystic appearance and size of some of the  nodules.     Electronically Signed By-Fifi Rodriguez On:9/23/2019 6:45 PM  This report was finalized on [id number] by  Fifi Rodriguez, .         Medication Review: Reviewed.       Current Outpatient Medications:   •  aspirin 81 MG EC tablet, Take 1 tablet by mouth Daily., Disp: 90 tablet, Rfl: 3  •  atorvastatin (LIPITOR) 10 MG tablet, Take 1 tablet by mouth Daily., Disp: 90 tablet, Rfl: 3  •  bimatoprost (LUMIGAN) 0.01 % ophthalmic drops, LUMIGAN 0.01 % SOLN, Disp: , Rfl:   •  COMBIGAN 0.2-0.5 % ophthalmic solution, Administer 1 drop to both eyes 2 (Two) Times a Day., Disp: , Rfl:   •  metoprolol succinate XL (TOPROL-XL) 25 MG 24 hr tablet, Take 1 tablet by mouth Daily., Disp: 90 tablet, Rfl: 3  •  MULTIPLE VITAMIN PO, Take 1 tablet by mouth Daily., Disp: , Rfl:   •  nitroglycerin (NITROSTAT) 0.4 MG SL tablet, NITROSTAT 0.4 MG SUBL, Disp: , Rfl:   •  RHOPRESSA 0.02 % solution, , Disp: , Rfl:       Assessment/Plan   Multiple thyroid nodules: Previous biopsy of the right and isthmus area nodule was benign.  Recent thyroid ultrasound from September 2019 is a stable.  She is euthyroid with normal TSH of 1.2. Will follow Thyroid US.             Wander White MD FACE.    Much of the above report is an electronic transcription/translation of the spoken language to  printed text using Dragon Software. As such, the subtleties and finesse of the spoken language may permit erroneous, or at times, nonsensical words or phrases to be inadvertently transcribed; thus changes may be made at a later date to rectify these errors.

## 2020-09-24 NOTE — PATIENT INSTRUCTIONS
Please arrange for thyroid ultrasound in next few days  Please take vitamin D 2000 units p.o. daily  Please take vitamin B12 1000 mcg sublingual daily  Please take Os-Willy D500/200, 1 tablet p.o. daily  Please discuss with your cardiologist regarding the need to continue aspirin.  Follow-up in 1 year.

## 2020-10-06 ENCOUNTER — APPOINTMENT (OUTPATIENT)
Dept: ULTRASOUND IMAGING | Facility: HOSPITAL | Age: 85
End: 2020-10-06

## 2020-10-08 ENCOUNTER — HOSPITAL ENCOUNTER (OUTPATIENT)
Dept: ULTRASOUND IMAGING | Facility: HOSPITAL | Age: 85
Discharge: HOME | End: 2020-10-08
Admitting: INTERNAL MEDICINE

## 2020-10-08 DIAGNOSIS — E04.2 MULTIPLE THYROID NODULES: ICD-10-CM

## 2020-10-08 PROCEDURE — 76536 US EXAM OF HEAD AND NECK: CPT

## 2020-10-09 ENCOUNTER — TELEPHONE (OUTPATIENT)
Dept: ENDOCRINOLOGY | Facility: CLINIC | Age: 85
End: 2020-10-09

## 2020-10-09 NOTE — TELEPHONE ENCOUNTER
SON CALLED TO ADVISE MOTHER HAD HER US THYROID DONE [facility] YESTERDAY....ONCE DR. GALICIA GETS RESULTS TO CONTACT HIM DIRECTLY -[phone]. MOTHER GETS CONFUSED

## 2020-11-05 PROCEDURE — U0003 INFECTIOUS AGENT DETECTION BY NUCLEIC ACID (DNA OR RNA); SEVERE ACUTE RESPIRATORY SYNDROME CORONAVIRUS 2 (SARS-COV-2) (CORONAVIRUS DISEASE [COVID-19]), AMPLIFIED PROBE TECHNIQUE, MAKING USE OF HIGH THROUGHPUT TECHNOLOGIES AS DESCRIBED BY CMS-2020-01-R: HCPCS | Performed by: NURSE PRACTITIONER

## 2020-11-08 ENCOUNTER — TELEPHONE (OUTPATIENT)
Dept: URGENT CARE | Facility: CLINIC | Age: 85
End: 2020-11-08

## 2020-11-09 ENCOUNTER — TELEPHONE (OUTPATIENT)
Dept: FAMILY MEDICINE CLINIC | Facility: CLINIC | Age: 85
End: 2020-11-09

## 2020-11-09 NOTE — TELEPHONE ENCOUNTER
Son, Portillo called to inform you that Sunday they got results that she tested Positive for COVID-19.

## 2021-01-04 ENCOUNTER — OFFICE VISIT (OUTPATIENT)
Dept: FAMILY MEDICINE CLINIC | Facility: CLINIC | Age: 86
End: 2021-01-04

## 2021-01-04 VITALS
TEMPERATURE: 97.3 F | BODY MASS INDEX: 20.92 KG/M2 | HEIGHT: 68 IN | HEART RATE: 108 BPM | WEIGHT: 138 LBS | SYSTOLIC BLOOD PRESSURE: 146 MMHG | DIASTOLIC BLOOD PRESSURE: 84 MMHG | OXYGEN SATURATION: 96 %

## 2021-01-04 DIAGNOSIS — R07.89 CHEST WALL PAIN: ICD-10-CM

## 2021-01-04 DIAGNOSIS — R32 URINARY INCONTINENCE, UNSPECIFIED TYPE: Chronic | ICD-10-CM

## 2021-01-04 DIAGNOSIS — M25.511 RIGHT SHOULDER PAIN, UNSPECIFIED CHRONICITY: ICD-10-CM

## 2021-01-04 LAB
BILIRUB BLD-MCNC: ABNORMAL MG/DL
CLARITY, POC: CLEAR
COLOR UR: YELLOW
GLUCOSE UR STRIP-MCNC: NEGATIVE MG/DL
KETONES UR QL: NEGATIVE
LEUKOCYTE EST, POC: ABNORMAL
NITRITE UR-MCNC: NEGATIVE MG/ML
PH UR: 5.5 [PH] (ref 5–8)
PROT UR STRIP-MCNC: ABNORMAL MG/DL
RBC # UR STRIP: ABNORMAL /UL
SP GR UR: 1.03 (ref 1–1.03)
UROBILINOGEN UR QL: NORMAL

## 2021-01-04 PROCEDURE — 99213 OFFICE O/P EST LOW 20 MIN: CPT | Performed by: FAMILY MEDICINE

## 2021-01-04 PROCEDURE — 87086 URINE CULTURE/COLONY COUNT: CPT | Performed by: FAMILY MEDICINE

## 2021-01-04 PROCEDURE — 81003 URINALYSIS AUTO W/O SCOPE: CPT | Performed by: FAMILY MEDICINE

## 2021-01-04 RX ORDER — NITROGLYCERIN 0.4 MG/1
0.4 TABLET SUBLINGUAL
Qty: 30 TABLET | Refills: 0 | Status: SHIPPED | OUTPATIENT
Start: 2021-01-04

## 2021-01-04 NOTE — PROGRESS NOTES
Subjective   Flori Tubbs is a 88 y.o. female.       88-year-old female patient present with his son with complaint of right-sided chest wall pain and right shoulder pain.  The symptom noted yesterday. The quality of the pain is described as dull aching. The pain is at a severity of 3/10. The pain is mild. The symptoms are aggravated by movement. She has tried aspirin for the symptoms.   Family has also noted that the patient gets confused at times and having auditory hallucination.  She is telling them that someone is ringing door bell at  Nighttime.       The following portions of the patient's history were reviewed and updated as appropriate: past medical history, past social history, past surgical history and problem list.    Review of Systems   Constitutional: Positive for fatigue. Negative for fever.   Respiratory: Negative for shortness of breath.    Cardiovascular: Negative for chest pain.   Gastrointestinal: Negative for abdominal pain.   Genitourinary: Positive for urinary incontinence. Negative for dysuria, flank pain and frequency.   Musculoskeletal: Positive for arthralgias. Negative for back pain.        Right-sided chest wall pain and right shoulder pain   Neurological: Positive for memory problem and confusion. Negative for dizziness, tremors, speech difficulty and weakness.       Objective   Physical Exam  Vitals signs reviewed.   Constitutional:       General: She is not in acute distress.     Appearance: Normal appearance.   Pulmonary:      Effort: Pulmonary effort is normal.      Breath sounds: Normal breath sounds.      Comments: Right-sided chest wall tenderness  Abdominal:      Tenderness: There is no abdominal tenderness.   Musculoskeletal:      Right shoulder: She exhibits decreased range of motion. She exhibits no tenderness and no swelling.   Neurological:      General: No focal deficit present.      Mental Status: She is alert and oriented to person, place, and time.      Gait: Gait  normal.   Psychiatric:         Mood and Affect: Mood is anxious.         Behavior: Behavior normal.       Vitals:    01/04/21 1126   BP: 146/84   Pulse: 108   Temp: 97.3 °F (36.3 °C)   SpO2: 96%     Current Outpatient Medications on File Prior to Visit   Medication Sig Dispense Refill   • aspirin 81 MG EC tablet Take 1 tablet by mouth Daily. 90 tablet 3   • atorvastatin (LIPITOR) 10 MG tablet Take 1 tablet by mouth Daily. 90 tablet 3   • bimatoprost (LUMIGAN) 0.01 % ophthalmic drops LUMIGAN 0.01 % SOLN     • calcium-vitamin D (Oscal 500/200 D-3) 500-200 MG-UNIT per tablet Take 1 tablet by mouth Daily. 180 tablet 3   • COMBIGAN 0.2-0.5 % ophthalmic solution Administer 1 drop to both eyes 2 (Two) Times a Day.     • Cyanocobalamin (Vitamin B-12) 1000 MCG sublingual tablet Place 1,000 mcg under the tongue Daily. 100 each 4   • metoprolol succinate XL (TOPROL-XL) 25 MG 24 hr tablet Take 1 tablet by mouth Daily. 90 tablet 3   • MULTIPLE VITAMIN PO Take 1 tablet by mouth Daily.     • RHOPRESSA 0.02 % solution      • Vitamin D, Ergocalciferol, 50 MCG (2000 UT) capsule Take 2,000 Units by mouth Daily. 100 capsule 4     No current facility-administered medications on file prior to visit.            Assessment/Plan   Problems Addressed this Visit        Genitourinary and Reproductive     Urinary incontinence    Relevant Orders    POCT urinalysis dipstick, automated (Completed)    Urine Culture - Urine, Urine, Clean Catch       Musculoskeletal and Injuries    Chest wall pain     Patient denies any chest pain today , positive right-sided chest wall  tenderness noted on exam.  Discussed symptom management.         Right shoulder pain     Discussed conservative management with heating pad, strengthening and stretching exercise and Tylenol as needed.           Diagnoses       Codes Comments    Chest wall pain right side     ICD-10-CM: R07.89  ICD-9-CM: 786.52     Right shoulder pain, unspecified chronicity     ICD-10-CM:  M25.511  ICD-9-CM: 719.41     Urinary incontinence, unspecified type     ICD-10-CM: R32  ICD-9-CM: 788.30

## 2021-01-05 LAB — BACTERIA SPEC AEROBE CULT: ABNORMAL

## 2021-01-05 RX ORDER — FLUCONAZOLE 150 MG/1
150 TABLET ORAL ONCE
Qty: 1 TABLET | Refills: 0 | Status: SHIPPED | OUTPATIENT
Start: 2021-01-05 | End: 2021-01-05

## 2021-01-05 RX ORDER — METOPROLOL SUCCINATE 25 MG/1
25 TABLET, EXTENDED RELEASE ORAL DAILY
Qty: 7 TABLET | Refills: 0 | Status: SHIPPED | OUTPATIENT
Start: 2021-01-05 | End: 2021-05-12 | Stop reason: SDUPTHER

## 2021-01-05 NOTE — ASSESSMENT & PLAN NOTE
Discussed conservative management with heating pad, strengthening and stretching exercise and Tylenol as needed.

## 2021-01-05 NOTE — ASSESSMENT & PLAN NOTE
Patient denies any chest pain today , positive right-sided chest wall  tenderness noted on exam.  Discussed symptom management.

## 2021-03-11 ENCOUNTER — OFFICE VISIT (OUTPATIENT)
Dept: FAMILY MEDICINE CLINIC | Facility: CLINIC | Age: 86
End: 2021-03-11

## 2021-03-11 VITALS
TEMPERATURE: 97.3 F | SYSTOLIC BLOOD PRESSURE: 144 MMHG | HEART RATE: 78 BPM | DIASTOLIC BLOOD PRESSURE: 72 MMHG | BODY MASS INDEX: 20.31 KG/M2 | WEIGHT: 134 LBS | OXYGEN SATURATION: 96 % | HEIGHT: 68 IN

## 2021-03-11 DIAGNOSIS — R41.3 MEMORY IMPAIRMENT: Primary | ICD-10-CM

## 2021-03-11 DIAGNOSIS — R44.0 AUDITORY HALLUCINATION: ICD-10-CM

## 2021-03-11 PROCEDURE — 99213 OFFICE O/P EST LOW 20 MIN: CPT | Performed by: FAMILY MEDICINE

## 2021-03-11 NOTE — PROGRESS NOTES
Subjective   Flori Tubbs is a 88 y.o. female.     Patient presents with son with complaining of memory impairment.  The family has noted that gradually patient's memory is getting worse also she is having the auditory hallucination.  She is resides at assisted living by herself and telling son that someone knocking on the door in the middle of the night, and she is hearing voices while no one is there.  She denies depression, behavioral changes.  Appetite is fair she eats 1-2 meals per day.    Memory Loss  This is a recurrent problem. Episode onset: more than 4 months. The problem occurs daily. The problem has been gradually worsening. Associated symptoms include fatigue. Pertinent negatives include no abdominal pain, chest pain, congestion, coughing, fever, headaches, nausea, sore throat, visual change or weakness.        The following portions of the patient's history were reviewed and updated as appropriate: past medical history, past social history, past surgical history and problem list.    Review of Systems   Constitutional: Positive for fatigue. Negative for fever.   HENT: Negative for congestion and sore throat.    Respiratory: Negative for cough.    Cardiovascular: Negative for chest pain.   Gastrointestinal: Negative for abdominal pain and nausea.   Neurological: Positive for memory problem. Negative for dizziness, syncope, facial asymmetry, speech difficulty, weakness and headache.   Psychiatric/Behavioral: Positive for hallucinations and sleep disturbance. Negative for suicidal ideas. The patient is nervous/anxious.        Objective   Physical Exam  Vitals reviewed.   Constitutional:       General: She is not in acute distress.  Cardiovascular:      Pulses: Normal pulses.      Heart sounds: Normal heart sounds.   Pulmonary:      Effort: Pulmonary effort is normal.      Breath sounds: Normal breath sounds. No wheezing.   Neurological:      General: No focal deficit present.      Mental Status: She is  alert. Mental status is at baseline.      Motor: No weakness.      Gait: Gait normal.      Comments: Alert and oriented at base line.   Psychiatric:         Mood and Affect: Mood normal.         Behavior: Behavior normal.       Vitals:    03/11/21 1510   BP: 144/72   Pulse: 78   Temp: 97.3 °F (36.3 °C)   SpO2: 96%     Current Outpatient Medications on File Prior to Visit   Medication Sig Dispense Refill   • aspirin 81 MG EC tablet Take 1 tablet by mouth Daily. 90 tablet 3   • atorvastatin (LIPITOR) 10 MG tablet Take 1 tablet by mouth Daily. 90 tablet 3   • bimatoprost (LUMIGAN) 0.01 % ophthalmic drops LUMIGAN 0.01 % SOLN     • calcium-vitamin D (Oscal 500/200 D-3) 500-200 MG-UNIT per tablet Take 1 tablet by mouth Daily. 180 tablet 3   • COMBIGAN 0.2-0.5 % ophthalmic solution Administer 1 drop to both eyes 2 (Two) Times a Day.     • Cyanocobalamin (Vitamin B-12) 1000 MCG sublingual tablet Place 1,000 mcg under the tongue Daily. 100 each 4   • metoprolol succinate XL (TOPROL-XL) 25 MG 24 hr tablet Take 1 tablet by mouth Daily. 7 tablet 0   • MULTIPLE VITAMIN PO Take 1 tablet by mouth Daily.     • nitroglycerin (Nitrostat) 0.4 MG SL tablet Place 1 tablet under the tongue Every 5 (Five) Minutes As Needed for Chest Pain. Take no more than 3 doses in 15 minutes. 30 tablet 0   • RHOPRESSA 0.02 % solution      • Vitamin D, Ergocalciferol, 50 MCG (2000 UT) capsule Take 2,000 Units by mouth Daily. 100 capsule 4     No current facility-administered medications on file prior to visit.           Assessment/Plan   Problems Addressed this Visit        Neuro    Memory impairment - Primary     Discussed memory  impairment is possible due to gradual onset of dementia.  Discussed medication and neuropsych testing.  Patient would like to wait and discuss with the family about medications.  Refer to neurology for further evaluation.         Relevant Orders    Ambulatory Referral to Neurology       Symptoms and Signs    Auditory  hallucination    Relevant Orders    Ambulatory Referral to Neurology      Diagnoses       Codes Comments    Memory impairment    -  Primary ICD-10-CM: R41.3  ICD-9-CM: 780.93     Auditory hallucination     ICD-10-CM: R44.0  ICD-9-CM: 780.1

## 2021-03-14 PROBLEM — R41.3 MEMORY IMPAIRMENT: Status: ACTIVE | Noted: 2021-03-14

## 2021-03-14 PROBLEM — R44.0 AUDITORY HALLUCINATION: Status: ACTIVE | Noted: 2021-03-14

## 2021-04-27 ENCOUNTER — TELEPHONE (OUTPATIENT)
Dept: CARDIOLOGY | Facility: CLINIC | Age: 86
End: 2021-04-27

## 2021-04-27 DIAGNOSIS — I10 ESSENTIAL HYPERTENSION: ICD-10-CM

## 2021-04-27 DIAGNOSIS — I20.9 ANGINA PECTORIS (HCC): ICD-10-CM

## 2021-04-27 DIAGNOSIS — I25.10 CAD S/P PERCUTANEOUS CORONARY ANGIOPLASTY: ICD-10-CM

## 2021-04-27 DIAGNOSIS — E78.5 DYSLIPIDEMIA: ICD-10-CM

## 2021-04-27 DIAGNOSIS — I31.8 PERICARDIAL MASS: ICD-10-CM

## 2021-04-27 DIAGNOSIS — Z98.61 CAD S/P PERCUTANEOUS CORONARY ANGIOPLASTY: ICD-10-CM

## 2021-05-11 NOTE — PROGRESS NOTES
Chief Complaint  Memory Loss (NEW PT REFERRAL [facility] C/O MEMORY LOSS DIFF WALKING ), Gait Problem, and Establish Care NEW PT REFERRAL DR WHITE C/OMEMORY LOSS GAIT BALANCE ISSUES   Subjective            Flori Tubbs presents to [facility] for memory  History of Present Illness     New patient referred by Dr. White for memory  Patient has some confusion it has been getting worse past six months.  pts son thinks she needs some help due to early dementia. Independent living.   Her evening meal is prepared for her.      She has noted a little trouble with memory  She has auditory hallucinations  Mostly in evening.   Can't remember facts such as where he  was buried.   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 Known of and no tremors  She has stable kidney dysfunction.     No immediate family history of dementia  Sister  at 94,     ======================================================  Memory Loss  This is a recurrent problem. Episode onset: more than 4 months. The problem occurs daily. The problem has been gradually worsening. Associated symptoms include fatigue.     Family History   Problem Relation Age of Onset   • Cancer Mother    • Cancer Father    • Cancer Sister        Past Medical History:   Diagnosis Date   • Arthritis    • CAD (coronary artery disease)    • Cancer (CMS/[facility])    • CVA (cerebral vascular accident) (CMS/[facility])    • Difficulty walking    • Hypertension    • Memory loss    • Palpitation    • ST elevation    • TIA (transient ischemic attack)    • Weakness        Social History     Socioeconomic History   • Marital status:      Spouse name: Not on file   • Number of children: Not on file   • Years of education: Not on file   • Highest education level: Not on file   Tobacco Use   • Smoking status: Never Smoker   • Smokeless tobacco: Never Used   Vaping Use   • Vaping Use: Never used   Substance and Sexual Activity   • Alcohol use: No   •  Drug use: No   • Sexual activity: Defer         Current Outpatient Medications:   •  aspirin 81 MG EC tablet, Take 1 tablet by mouth Daily., Disp: 90 tablet, Rfl: 3  •  atorvastatin (LIPITOR) 10 MG tablet, Take 1 tablet by mouth Daily., Disp: 90 tablet, Rfl: 3  •  bimatoprost (LUMIGAN) 0.01 % ophthalmic drops, LUMIGAN 0.01 % SOLN, Disp: , Rfl:   •  calcium-vitamin D (Oscal 500/200 D-3) 500-200 MG-UNIT per tablet, Take 1 tablet by mouth Daily., Disp: 180 tablet, Rfl: 3  •  COMBIGAN 0.2-0.5 % ophthalmic solution, Administer 1 drop to both eyes 2 (Two) Times a Day., Disp: , Rfl:   •  Cyanocobalamin (Vitamin B-12) 1000 MCG sublingual tablet, Place 1,000 mcg under the tongue Daily., Disp: 100 each, Rfl: 4  •  metoprolol succinate XL (TOPROL-XL) 25 MG 24 hr tablet, Take 1 tablet by mouth Daily., Disp: 7 tablet, Rfl: 0  •  MULTIPLE VITAMIN PO, Take 1 tablet by mouth Daily., Disp: , Rfl:   •  nitroglycerin (Nitrostat) 0.4 MG SL tablet, Place 1 tablet under the tongue Every 5 (Five) Minutes As Needed for Chest Pain. Take no more than 3 doses in 15 minutes., Disp: 30 tablet, Rfl: 0  •  Vitamin D, Ergocalciferol, 50 MCG (2000 UT) capsule, Take 2,000 Units by mouth Daily., Disp: 100 capsule, Rfl: 4  •  donepezil (Aricept) 5 MG tablet, Take 1 tablet by mouth Every Night., Disp: 90 tablet, Rfl: 1  •  RHOPRESSA 0.02 % solution, , Disp: , Rfl:     Review of Systems   Constitutional: Positive for appetite change and fatigue.   HENT: Negative.    Eyes: Negative for discharge, itching and visual disturbance.   Respiratory: Negative for apnea, chest tightness and shortness of breath.    Cardiovascular: Negative for chest pain, palpitations and leg swelling.   Gastrointestinal: Negative for abdominal pain and nausea.   Endocrine: Negative for cold intolerance and heat intolerance.   Genitourinary: Negative for urgency.   Musculoskeletal: Negative for back pain and neck pain.   Skin: Negative.    Allergic/Immunologic: Negative.   "  Neurological: Negative for dizziness and numbness.   Psychiatric/Behavioral: Positive for confusion and hallucinations.            Objective   Vital Signs:   /92 (BP Location: Left arm, Patient Position: Sitting, Cuff Size: Adult)   Pulse 67   Temp 98.9 °F (37.2 °C) (Temporal)   Resp 20   Ht 172.7 cm (68\")   Wt 61.1 kg (134 lb 9.6 oz)   SpO2 98%   BMI 20.47 kg/m²     Physical Exam  Vitals reviewed.   HENT:      Head: Normocephalic.   Eyes:      Pupils: Pupils are equal, round, and reactive to light.   Cardiovascular:      Rate and Rhythm: Normal rate.      Pulses: Normal pulses.   Pulmonary:      Effort: Pulmonary effort is normal.      Breath sounds: No rales.   Musculoskeletal:         General: Normal range of motion.      Cervical back: Normal range of motion.      Right lower leg: No edema.      Left lower leg: No edema.   Neurological:      Mental Status: She is alert and oriented to person, place, and time.   Psychiatric:         Mood and Affect: Mood normal.        Result Review :                Neurologic Exam     Mental Status   Oriented to person, place, and time.     Cranial Nerves     CN III, IV, VI   Pupils are equal, round, and reactive to light.    Maximum   Score Patient's   Score Questions   5 4 \"What is the year?Season?Date?Day of the week?Month?\"   5 3 \"Where are we now: State?County?Town/city?Hospital?Floor?\"   3 3 3 Unrelated objects Number of trials:___   5 5 Count backward from 100 by sevens or spell WORLD backwards   3 3 Name 3 things from above   2 2 Identify 2 objects   1 0 Repeat the phrase: No ifs, ands,or buts.   3 1 Take paper in right hand, fold it in half, and put it on the floor.   1 1 Please read this and do what it says. \"Close your eyes\"   1 1 Make up and write a sentence about anything. Noun and verb   1 1 Copy this picture 10 angles must be present.   30 24 Total MMSE            Assessment and Plan    Diagnoses and all orders for this visit:    1. Memory loss " (Primary)  -     TSH; Future  -     Vitamin B12; Future  -     Folate; Future  -     Vitamin D 1,25 Dihydroxy; Future    2. Auditory hallucination    Other orders  -     donepezil (Aricept) 5 MG tablet; Take 1 tablet by mouth Every Night.  Dispense: 90 tablet; Refill: 1    This patient has an early dementia and that she requires some help with daily living such as filling out her pillbox though she has been able to remain in independent living.    Will start with aricept 5mg increase to 10mg if tolerated ,then start namenda    Would not treat the apparent hallucinations yet the fevers persist or worsen will consider a trial with seroquel or other atypical for hallucinations if needed.     Follow Up   Return in about 6 months (around 11/12/2021).  Patient was given instructions and counseling regarding her condition or for health maintenance advice. Please see specific information pulled into the AVS if appropriate.         This document has been electronically signed by Joseph Seipel, MD on May 12, 2021 14:00 EDT

## 2021-05-12 ENCOUNTER — OFFICE VISIT (OUTPATIENT)
Dept: NEUROLOGY | Facility: CLINIC | Age: 86
End: 2021-05-12

## 2021-05-12 VITALS
TEMPERATURE: 98.9 F | OXYGEN SATURATION: 98 % | WEIGHT: 134.6 LBS | HEART RATE: 67 BPM | DIASTOLIC BLOOD PRESSURE: 92 MMHG | RESPIRATION RATE: 20 BRPM | BODY MASS INDEX: 20.4 KG/M2 | SYSTOLIC BLOOD PRESSURE: 159 MMHG | HEIGHT: 68 IN

## 2021-05-12 DIAGNOSIS — R41.3 MEMORY LOSS: Primary | ICD-10-CM

## 2021-05-12 DIAGNOSIS — R44.0 AUDITORY HALLUCINATION: ICD-10-CM

## 2021-05-12 PROCEDURE — 99204 OFFICE O/P NEW MOD 45 MIN: CPT | Performed by: PSYCHIATRY & NEUROLOGY

## 2021-05-12 RX ORDER — DONEPEZIL HYDROCHLORIDE 5 MG/1
5 TABLET, FILM COATED ORAL NIGHTLY
Qty: 90 TABLET | Refills: 1 | Status: SHIPPED | OUTPATIENT
Start: 2021-05-12 | End: 2021-06-29 | Stop reason: ALTCHOICE

## 2021-05-12 RX ORDER — METOPROLOL SUCCINATE 25 MG/1
25 TABLET, EXTENDED RELEASE ORAL DAILY
Qty: 15 TABLET | Refills: 0 | Status: SHIPPED | OUTPATIENT
Start: 2021-05-12 | End: 2021-05-14 | Stop reason: SDUPTHER

## 2021-05-12 RX ORDER — METOPROLOL SUCCINATE 25 MG/1
25 TABLET, EXTENDED RELEASE ORAL DAILY
Qty: 7 TABLET | Refills: 0 | Status: CANCELLED | OUTPATIENT
Start: 2021-05-12

## 2021-05-12 NOTE — TELEPHONE ENCOUNTER
METOPROLOL ER  SUCCINATE 25 MG 1 QD  7 DAYS TO HOLD UNTIL MAIL ORDER COMES                            [facility]  [address]  [place]   THIS IS PER HUMA[employer] [phone]

## 2021-05-14 RX ORDER — METOPROLOL SUCCINATE 25 MG/1
25 TABLET, EXTENDED RELEASE ORAL DAILY
Qty: 30 TABLET | Refills: 0 | Status: SHIPPED | OUTPATIENT
Start: 2021-05-14 | End: 2021-06-10

## 2021-05-17 ENCOUNTER — OFFICE VISIT (OUTPATIENT)
Dept: FAMILY MEDICINE CLINIC | Facility: CLINIC | Age: 86
End: 2021-05-17

## 2021-05-17 VITALS
HEIGHT: 68 IN | WEIGHT: 133 LBS | OXYGEN SATURATION: 96 % | SYSTOLIC BLOOD PRESSURE: 144 MMHG | HEART RATE: 69 BPM | TEMPERATURE: 97.3 F | DIASTOLIC BLOOD PRESSURE: 75 MMHG | BODY MASS INDEX: 20.16 KG/M2

## 2021-05-17 DIAGNOSIS — N30.00 ACUTE CYSTITIS WITHOUT HEMATURIA: ICD-10-CM

## 2021-05-17 DIAGNOSIS — R30.0 DYSURIA: Primary | ICD-10-CM

## 2021-05-17 PROCEDURE — 81003 URINALYSIS AUTO W/O SCOPE: CPT | Performed by: FAMILY MEDICINE

## 2021-05-17 PROCEDURE — 99213 OFFICE O/P EST LOW 20 MIN: CPT | Performed by: FAMILY MEDICINE

## 2021-05-17 PROCEDURE — 87086 URINE CULTURE/COLONY COUNT: CPT | Performed by: FAMILY MEDICINE

## 2021-05-17 RX ORDER — NITROFURANTOIN 25; 75 MG/1; MG/1
100 CAPSULE ORAL 2 TIMES DAILY
Qty: 14 CAPSULE | Refills: 0 | Status: SHIPPED | OUTPATIENT
Start: 2021-05-17 | End: 2021-05-24

## 2021-05-17 NOTE — PROGRESS NOTES
Subjective   Flori Tubbs is a 88 y.o. female.     History of Present Illness     Patient presents with urinary frequency and confusion. This is a new problem. The current episode started in the past 7 days. She denies fever, lower abdominal pain and flank pain, hematuria, nausea or vomiting.         The following portions of the patient's history were reviewed and updated as appropriate: past medical history, past social history, past surgical history and problem list.    Review of Systems   Constitutional: Negative for fever.   Genitourinary: Positive for difficulty urinating and urgency. Negative for dysuria, flank pain and hematuria.   Neurological: Positive for weakness.       Objective   Physical Exam  Vitals reviewed.   Pulmonary:      Effort: Pulmonary effort is normal.   Abdominal:      Tenderness: There is no abdominal tenderness. There is no right CVA tenderness or left CVA tenderness.   Neurological:      Mental Status: She is alert and oriented to person, place, and time.   Psychiatric:         Mood and Affect: Mood normal.         Behavior: Behavior normal.       Vitals:    05/17/21 1158   BP: 144/75   Pulse:    Temp:    SpO2:      Current Outpatient Medications on File Prior to Visit   Medication Sig Dispense Refill   • aspirin 81 MG EC tablet Take 1 tablet by mouth Daily. 90 tablet 3   • atorvastatin (LIPITOR) 10 MG tablet Take 1 tablet by mouth Daily. 90 tablet 3   • bimatoprost (LUMIGAN) 0.01 % ophthalmic drops LUMIGAN 0.01 % SOLN     • calcium-vitamin D (Oscal 500/200 D-3) 500-200 MG-UNIT per tablet Take 1 tablet by mouth Daily. 180 tablet 3   • COMBIGAN 0.2-0.5 % ophthalmic solution Administer 1 drop to both eyes 2 (Two) Times a Day.     • Cyanocobalamin (Vitamin B-12) 1000 MCG sublingual tablet Place 1,000 mcg under the tongue Daily. 100 each 4   • donepezil (Aricept) 5 MG tablet Take 1 tablet by mouth Every Night. 90 tablet 1   • metoprolol succinate XL (TOPROL-XL) 25 MG 24 hr tablet Take 1  tablet by mouth Daily. 30 tablet 0   • MULTIPLE VITAMIN PO Take 1 tablet by mouth Daily.     • nitroglycerin (Nitrostat) 0.4 MG SL tablet Place 1 tablet under the tongue Every 5 (Five) Minutes As Needed for Chest Pain. Take no more than 3 doses in 15 minutes. 30 tablet 0   • RHOPRESSA 0.02 % solution      • Vitamin D, Ergocalciferol, 50 MCG (2000 UT) capsule Take 2,000 Units by mouth Daily. 100 capsule 4     No current facility-administered medications on file prior to visit.           Assessment/Plan   Problems Addressed this Visit        Genitourinary and Reproductive     Dysuria - Primary    Relevant Orders    POCT urinalysis dipstick, automated (Completed)    Urine Culture - Urine, Urine, Clean Catch (Completed)      Other Visit Diagnoses     Acute cystitis without hematuria        Relevant Medications    nitrofurantoin, macrocrystal-monohydrate, (Macrobid) 100 MG capsule      Diagnoses       Codes Comments    Dysuria    -  Primary ICD-10-CM: R30.0  ICD-9-CM: 788.1     Acute cystitis without hematuria     ICD-10-CM: N30.00  ICD-9-CM: 595.0

## 2021-05-18 LAB — BACTERIA SPEC AEROBE CULT: NORMAL

## 2021-06-08 DIAGNOSIS — I10 ESSENTIAL HYPERTENSION: ICD-10-CM

## 2021-06-08 DIAGNOSIS — I20.9 ANGINA PECTORIS (HCC): ICD-10-CM

## 2021-06-08 DIAGNOSIS — E78.5 DYSLIPIDEMIA: ICD-10-CM

## 2021-06-08 DIAGNOSIS — I25.10 CAD S/P PERCUTANEOUS CORONARY ANGIOPLASTY: ICD-10-CM

## 2021-06-08 DIAGNOSIS — I31.8 PERICARDIAL MASS: ICD-10-CM

## 2021-06-08 DIAGNOSIS — Z98.61 CAD S/P PERCUTANEOUS CORONARY ANGIOPLASTY: ICD-10-CM

## 2021-06-09 ENCOUNTER — LAB (OUTPATIENT)
Dept: LAB | Facility: HOSPITAL | Age: 86
End: 2021-06-09

## 2021-06-09 DIAGNOSIS — R41.3 MEMORY LOSS: ICD-10-CM

## 2021-06-09 LAB
ALBUMIN SERPL-MCNC: 4.2 G/DL (ref 3.5–5.2)
ALBUMIN/GLOB SERPL: 1.5 G/DL
ALP SERPL-CCNC: 45 U/L (ref 39–117)
ALT SERPL W P-5'-P-CCNC: 11 U/L (ref 1–33)
ANION GAP SERPL CALCULATED.3IONS-SCNC: 8.5 MMOL/L (ref 5–15)
AST SERPL-CCNC: 15 U/L (ref 1–32)
BILIRUB SERPL-MCNC: 0.6 MG/DL (ref 0–1.2)
BUN SERPL-MCNC: 17 MG/DL (ref 8–23)
BUN/CREAT SERPL: 20 (ref 7–25)
CALCIUM SPEC-SCNC: 9.8 MG/DL (ref 8.6–10.5)
CHLORIDE SERPL-SCNC: 106 MMOL/L (ref 98–107)
CHOLEST SERPL-MCNC: 135 MG/DL (ref 0–200)
CK SERPL-CCNC: 57 U/L (ref 20–180)
CO2 SERPL-SCNC: 27.5 MMOL/L (ref 22–29)
CREAT SERPL-MCNC: 0.85 MG/DL (ref 0.57–1)
FOLATE SERPL-MCNC: 9.75 NG/ML (ref 4.78–24.2)
GFR SERPL CREATININE-BSD FRML MDRD: 63 ML/MIN/1.73
GLOBULIN UR ELPH-MCNC: 2.8 GM/DL
GLUCOSE SERPL-MCNC: 93 MG/DL (ref 65–99)
HDLC SERPL-MCNC: 62 MG/DL (ref 40–60)
LDLC SERPL CALC-MCNC: 56 MG/DL (ref 0–100)
LDLC/HDLC SERPL: 0.88 {RATIO}
POTASSIUM SERPL-SCNC: 4 MMOL/L (ref 3.5–5.2)
PROT SERPL-MCNC: 7 G/DL (ref 6–8.5)
SODIUM SERPL-SCNC: 142 MMOL/L (ref 136–145)
TRIGL SERPL-MCNC: 91 MG/DL (ref 0–150)
TSH SERPL DL<=0.05 MIU/L-ACNC: 1.13 UIU/ML (ref 0.27–4.2)
VIT B12 BLD-MCNC: 651 PG/ML (ref 211–946)
VLDLC SERPL-MCNC: 17 MG/DL (ref 5–40)

## 2021-06-09 PROCEDURE — 80053 COMPREHEN METABOLIC PANEL: CPT | Performed by: INTERNAL MEDICINE

## 2021-06-09 PROCEDURE — 84443 ASSAY THYROID STIM HORMONE: CPT

## 2021-06-09 PROCEDURE — 82550 ASSAY OF CK (CPK): CPT | Performed by: INTERNAL MEDICINE

## 2021-06-09 PROCEDURE — 82652 VIT D 1 25-DIHYDROXY: CPT

## 2021-06-09 PROCEDURE — 82746 ASSAY OF FOLIC ACID SERUM: CPT

## 2021-06-09 PROCEDURE — 80061 LIPID PANEL: CPT | Performed by: INTERNAL MEDICINE

## 2021-06-09 PROCEDURE — 36415 COLL VENOUS BLD VENIPUNCTURE: CPT

## 2021-06-09 PROCEDURE — 82607 VITAMIN B-12: CPT

## 2021-06-10 RX ORDER — METOPROLOL SUCCINATE 25 MG/1
TABLET, EXTENDED RELEASE ORAL
Qty: 30 TABLET | Refills: 0 | Status: SHIPPED | OUTPATIENT
Start: 2021-06-10 | End: 2021-06-15 | Stop reason: SINTOL

## 2021-06-11 ENCOUNTER — TELEPHONE (OUTPATIENT)
Dept: FAMILY MEDICINE CLINIC | Facility: CLINIC | Age: 86
End: 2021-06-11

## 2021-06-11 LAB — 1,25(OH)2D SERPL-MCNC: 49.7 PG/ML (ref 19.9–79.3)

## 2021-06-15 ENCOUNTER — OFFICE VISIT (OUTPATIENT)
Dept: CARDIOLOGY | Facility: CLINIC | Age: 86
End: 2021-06-15

## 2021-06-15 VITALS
OXYGEN SATURATION: 97 % | BODY MASS INDEX: 19.7 KG/M2 | SYSTOLIC BLOOD PRESSURE: 102 MMHG | HEART RATE: 61 BPM | DIASTOLIC BLOOD PRESSURE: 64 MMHG | WEIGHT: 130 LBS | HEIGHT: 68 IN

## 2021-06-15 DIAGNOSIS — I31.8 PERICARDIAL MASS: ICD-10-CM

## 2021-06-15 DIAGNOSIS — I25.10 CAD S/P PERCUTANEOUS CORONARY ANGIOPLASTY: ICD-10-CM

## 2021-06-15 DIAGNOSIS — R55 NEAR SYNCOPE: ICD-10-CM

## 2021-06-15 DIAGNOSIS — I20.9 ANGINA PECTORIS (HCC): ICD-10-CM

## 2021-06-15 DIAGNOSIS — R06.09 DYSPNEA ON EXERTION: ICD-10-CM

## 2021-06-15 DIAGNOSIS — E78.5 DYSLIPIDEMIA: ICD-10-CM

## 2021-06-15 DIAGNOSIS — R00.1 BRADYCARDIA: ICD-10-CM

## 2021-06-15 DIAGNOSIS — R53.83 FATIGUE, UNSPECIFIED TYPE: Primary | ICD-10-CM

## 2021-06-15 DIAGNOSIS — Z98.61 CAD S/P PERCUTANEOUS CORONARY ANGIOPLASTY: ICD-10-CM

## 2021-06-15 PROCEDURE — 99214 OFFICE O/P EST MOD 30 MIN: CPT | Performed by: INTERNAL MEDICINE

## 2021-06-15 PROCEDURE — 93000 ELECTROCARDIOGRAM COMPLETE: CPT | Performed by: INTERNAL MEDICINE

## 2021-06-15 NOTE — PROGRESS NOTES
Subjective:     Encounter Date:06/15/2021      Patient ID: Flori Tubbs is a 89 y.o. female.      Chief Complaint : Follow-up for CAD, PCI, dyslipidemia, hypertension, bradycardia  History of Present Illness         This is an 89-year-old with  PMH of    #. CAD,  NSTEMI and COLLEEN with 2.75 x 15 Xience to RCA 2015  #.  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 hypertension, dyslipidemia  #. h/o CVA,  vocal cord paralysis  #. Cholecystectomy, breast biopsy and   #. allergy/intolerance to epinephrine     here  here for follow-up.  Patient is complaining of intermittent substernal chest pain with no aggravating or relieving factors.,  Short-lived.  Has chronic fatigue and shortness of breath., denies lightheadedness dizziness loss of consciousness.  Patient has lot of weakness and fatigue and dyspnea on exertion and dizziness and near syncope.    Patient is bradycardic at 49 bpm.  Patient's arterial blood pressure is 102/64, heart rate 49, O2 sat of 97% on room air.  Patient had labs done 2019 which showed cholesterol 218 HDL 53  BUN creatinine of 26/1.3.  Labs from 2019 reveal CMP with a BUN/creatinine of 19/1.2.  BNP is normal at 43.,  Labs from 2019 reveal TSH of 0.98.  Labs from 2021 revealed TSH normal at 1.13 CMP normal, CK normal, cholesterol 135, triglycerides 91, HDL 62, LDL 56.     ASSESSMENT:  #Fatigue  #Dyspnea on exertion  #  chest pain  #.  history of low TSH  #  anemia  # blood loss anemia, cecal mass, status post right hemicolectomy 2018  #  CAD, history of non ST elevation MI, PCI  #  dyslipidemia  # . pericardial mass, thymoma  #    hypertension  #. history of CVA.     PLAN:  Reviewed EKG and labs with patient  Patient's heart rate is slow  Will discontinue Toprol  Check a Holter monitor  Advised to follow-up with endocrinology for low TSH.  Continue conservative management due to patient's advanced age.  Advised patient to take over-the-counter baby  aspirin.  Continue as needed nitroglycerin  Fish oil due to advanced age   Will followup in 6 months or sooner if needed.  Discussed with patient and son accompanying the patient.      The following portions of the patient's history were reviewed and updated as appropriate: allergies, current medications, past family history, past medical history, past social history, past surgical history and problem list.    Assessment:         [facility]     Diagnosis Plan   1. Fatigue, unspecified type  Holter Monitor - 72 Hour Up To 15 Days   2. Dyspnea on exertion  Holter Monitor - 72 Hour Up To 15 Days   3. Bradycardia  Holter Monitor - 72 Hour Up To 15 Days   4. Angina pectoris (CMS/HCC)  Holter Monitor - 72 Hour Up To 15 Days   5. CAD S/P percutaneous coronary angioplasty  Holter Monitor - 72 Hour Up To 15 Days   6. Dyslipidemia  Holter Monitor - 72 Hour Up To 15 Days   7. Pericardial mass  Holter Monitor - 72 Hour Up To 15 Days   8. Near syncope  Holter Monitor - 72 Hour Up To 15 Days          Plan:             ECG 12 Lead    Date/Time: 6/15/2021 10:48 AM  Performed by: Taurus Robles MD  Authorized by: Taurus Robles MD   Comparison: compared with previous ECG from 6/5/2020  Comparison to previous ECG: EKG done today reviewed/interpreted by me reveals sinus bradycardia with rate of 49 bpm with incomplete right bundle branch block, old anterior wall MI, compared to EKG from 6/5/2020 heart rate is much slower.  And patient is developing incomplete right bundle branch block.              Past Medical History:  Past Medical History:   Diagnosis Date   • Arthritis    • CAD (coronary artery disease)    • Cancer (CMS/HCC)    • CVA (cerebral vascular accident) (CMS/HCC)    • Difficulty walking    • Hypertension    • Memory loss    • Palpitation    • ST elevation    • TIA (transient ischemic attack)    • Weakness      Past Surgical History:  Past Surgical History:   Procedure Laterality Date   • CATARACT EXTRACTION      •  SECTION     • COLON SURGERY  2018   • MASS EXCISION  colon      Allergies:  Allergies   Allergen Reactions   • Epinephrine Unknown (See Comments)   • Sulfamethoxazole-Trimethoprim Rash     Home Meds:  Current Meds:     Current Outpatient Medications:   •  aspirin 81 MG EC tablet, Take 1 tablet by mouth Daily., Disp: 90 tablet, Rfl: 3  •  atorvastatin (LIPITOR) 10 MG tablet, Take 1 tablet by mouth Daily., Disp: 90 tablet, Rfl: 3  •  bimatoprost (LUMIGAN) 0.01 % ophthalmic drops, LUMIGAN 0.01 % SOLN, Disp: , Rfl:   •  calcium-vitamin D (Oscal 500/200 D-3) 500-200 MG-UNIT per tablet, Take 1 tablet by mouth Daily., Disp: 180 tablet, Rfl: 3  •  COMBIGAN 0.2-0.5 % ophthalmic solution, Administer 1 drop to both eyes 2 (Two) Times a Day., Disp: , Rfl:   •  Cyanocobalamin (Vitamin B-12) 1000 MCG sublingual tablet, Place 1,000 mcg under the tongue Daily., Disp: 100 each, Rfl: 4  •  donepezil (Aricept) 5 MG tablet, Take 1 tablet by mouth Every Night., Disp: 90 tablet, Rfl: 1  •  MULTIPLE VITAMIN PO, Take 1 tablet by mouth Daily., Disp: , Rfl:   •  nitroglycerin (Nitrostat) 0.4 MG SL tablet, Place 1 tablet under the tongue Every 5 (Five) Minutes As Needed for Chest Pain. Take no more than 3 doses in 15 minutes., Disp: 30 tablet, Rfl: 0  •  RHOPRESSA 0.02 % solution, , Disp: , Rfl:   •  Vitamin D, Ergocalciferol, 50 MCG (2000 UT) capsule, Take 2,000 Units by mouth Daily., Disp: 100 capsule, Rfl: 4  Social History:   Social History     Tobacco Use   • Smoking status: Never Smoker   • Smokeless tobacco: Never Used   Substance Use Topics   • Alcohol use: No      Family History:  Family History   Problem Relation Age of Onset   • Cancer Mother    • Cancer Father    • Cancer Sister               Review of Systems   Constitutional: Positive for malaise/fatigue.   Cardiovascular: Positive for palpitations. Negative for chest pain and leg swelling.   Respiratory: Positive for shortness of breath.    Skin: Negative for  "rash.   Neurological: Negative for dizziness, light-headedness and numbness.     All other systems are negative         Objective:     Physical Exam  /64   Pulse 61   Ht 172.7 cm (68\")   Wt 59 kg (130 lb)   SpO2 97%   BMI 19.77 kg/m²   General:  Appears in no acute distress  Eyes: Sclera is anicteric,  conjunctiva is clear   HEENT:  No JVD.  No carotid bruits  Respiratory: Respirations regular and unlabored at rest.  Clear to auscultation  Cardiovascular: S1,S2 Regular rate and rhythm. No murmur, rub or gallop auscultated.   Gastrointestinal: Abdomen nondistended, soft  Extremities: No digital clubbing or cyanosis, no edema  Skin: Color pink. Skin warm and dry to touch. No rashes  No xanthoma  Neuro: Alert and awake, no lateralizing deficits appreciated    Lab Reviewed:                  "

## 2021-06-18 ENCOUNTER — TELEPHONE (OUTPATIENT)
Dept: CARDIOLOGY | Facility: CLINIC | Age: 86
End: 2021-06-18

## 2021-06-18 DIAGNOSIS — R53.83 FATIGUE, UNSPECIFIED TYPE: ICD-10-CM

## 2021-06-18 DIAGNOSIS — R00.2 PALPITATIONS: ICD-10-CM

## 2021-06-18 DIAGNOSIS — I25.10 CHRONIC CORONARY ARTERY DISEASE: Primary | ICD-10-CM

## 2021-06-18 DIAGNOSIS — R07.89 ATYPICAL CHEST PAIN: ICD-10-CM

## 2021-06-18 DIAGNOSIS — R06.02 SHORTNESS OF BREATH: ICD-10-CM

## 2021-06-18 NOTE — TELEPHONE ENCOUNTER
Patient's son dropped off monitor today and along with it there was a note for .    1. Mom (Flori) gets mixed up at times  2. Mostly she tried to write down info  3. Gets out of breath when walking short distances  4. No energy, fatigue  5. When confused on meds - gets light headed, drunkenness, ect.   6. Has lost some short term memory  7. Any info call the daughter or son    Daughter - Erlinda - [phone]  Son - Portillo - [phone]

## 2021-06-23 ENCOUNTER — LAB (OUTPATIENT)
Dept: LAB | Facility: HOSPITAL | Age: 86
End: 2021-06-23

## 2021-06-23 DIAGNOSIS — I10 ESSENTIAL HYPERTENSION: ICD-10-CM

## 2021-06-23 DIAGNOSIS — I25.10 CHRONIC CORONARY ARTERY DISEASE: ICD-10-CM

## 2021-06-23 DIAGNOSIS — I25.10 CAD S/P PERCUTANEOUS CORONARY ANGIOPLASTY: ICD-10-CM

## 2021-06-23 DIAGNOSIS — R06.02 SHORTNESS OF BREATH: Primary | ICD-10-CM

## 2021-06-23 DIAGNOSIS — Z98.61 CAD S/P PERCUTANEOUS CORONARY ANGIOPLASTY: ICD-10-CM

## 2021-06-23 DIAGNOSIS — R06.02 SHORTNESS OF BREATH: ICD-10-CM

## 2021-06-23 DIAGNOSIS — R73.9 ELEVATED BLOOD SUGAR: ICD-10-CM

## 2021-06-23 LAB
HBA1C MFR BLD: 5.3 % (ref 3.5–5.6)
HGB BLD-MCNC: 14.3 G/DL (ref 12–15.9)
NT-PROBNP SERPL-MCNC: 364.3 PG/ML (ref 0–1800)

## 2021-06-23 PROCEDURE — 83036 HEMOGLOBIN GLYCOSYLATED A1C: CPT

## 2021-06-23 PROCEDURE — 36415 COLL VENOUS BLD VENIPUNCTURE: CPT

## 2021-06-23 PROCEDURE — 83880 ASSAY OF NATRIURETIC PEPTIDE: CPT

## 2021-06-23 PROCEDURE — 85018 HEMOGLOBIN: CPT

## 2021-06-29 ENCOUNTER — TELEPHONE (OUTPATIENT)
Dept: NEUROLOGY | Facility: CLINIC | Age: 86
End: 2021-06-29

## 2021-06-29 RX ORDER — DONEPEZIL HYDROCHLORIDE 10 MG/1
10 TABLET, FILM COATED ORAL NIGHTLY
Qty: 90 TABLET | Refills: 3 | Status: SHIPPED | OUTPATIENT
Start: 2021-06-29 | End: 2021-10-07 | Stop reason: SDUPTHER

## 2021-06-29 NOTE — TELEPHONE ENCOUNTER
Provider: DR SEIPEL    Caller: BRI    Relationship to Patient: SON    Pharmacy: [facility]    Phone Number: [phone]    Reason for Call: DIEGO IS TAKING THE ARICEPT 5 MG. HER SON IS STATING THAT SHE IS NEEDING TO BE BUMPED UP TO THE 10 MG. SHE IS HEARING THINGS AT NIGHT MORE AND MORE.      PLEASE CALL SON TO DISCUSS AND ADVISE.

## 2021-06-30 ENCOUNTER — TELEPHONE (OUTPATIENT)
Dept: CARDIOLOGY | Facility: CLINIC | Age: 86
End: 2021-06-30

## 2021-06-30 NOTE — TELEPHONE ENCOUNTER
Son called back, said raciel may be having to get pacemaker, he said she has slow heart and she is off of blood pressure medicine and he said he read where Aricept doesn't correspond well with slow heart and wants to know if that is true. He said she has been hearing things at night as well. Please advise.     [phone]

## 2021-06-30 NOTE — TELEPHONE ENCOUNTER
Continue the 5 mg Aricept for now and ask the cardiologist if he has any concern about the Aricept and her heart

## 2021-07-01 ENCOUNTER — LAB (OUTPATIENT)
Dept: FAMILY MEDICINE CLINIC | Facility: CLINIC | Age: 86
End: 2021-07-01

## 2021-07-01 DIAGNOSIS — N39.0 UTI (URINARY TRACT INFECTION), UNCOMPLICATED: Primary | ICD-10-CM

## 2021-07-01 PROCEDURE — 87086 URINE CULTURE/COLONY COUNT: CPT | Performed by: FAMILY MEDICINE

## 2021-07-01 NOTE — TELEPHONE ENCOUNTER
Son made aware that the holter was normal. Does this mean that she does not need a pacemaker?     Neurology would like to increase Aricept from 5 mg to 10 mg. They are wanting the okay from the cardiologist. Please advise.

## 2021-07-02 ENCOUNTER — OFFICE VISIT (OUTPATIENT)
Dept: FAMILY MEDICINE CLINIC | Facility: CLINIC | Age: 86
End: 2021-07-02

## 2021-07-02 VITALS
BODY MASS INDEX: 19.4 KG/M2 | DIASTOLIC BLOOD PRESSURE: 85 MMHG | SYSTOLIC BLOOD PRESSURE: 144 MMHG | HEIGHT: 68 IN | WEIGHT: 128 LBS | TEMPERATURE: 97.8 F | HEART RATE: 76 BPM | OXYGEN SATURATION: 95 %

## 2021-07-02 DIAGNOSIS — R32 URINARY INCONTINENCE, UNSPECIFIED TYPE: Primary | ICD-10-CM

## 2021-07-02 DIAGNOSIS — Z87.440 HISTORY OF UTI: ICD-10-CM

## 2021-07-02 LAB — BACTERIA SPEC AEROBE CULT: NO GROWTH

## 2021-07-02 PROCEDURE — 99213 OFFICE O/P EST LOW 20 MIN: CPT | Performed by: FAMILY MEDICINE

## 2021-07-06 PROBLEM — Z87.440 HISTORY OF UTI: Status: ACTIVE | Noted: 2021-07-06

## 2021-07-19 RX ORDER — ATORVASTATIN CALCIUM 10 MG/1
TABLET, FILM COATED ORAL
Qty: 90 TABLET | Refills: 3 | Status: SHIPPED | OUTPATIENT
Start: 2021-07-19

## 2021-07-19 RX ORDER — METOPROLOL SUCCINATE 25 MG/1
TABLET, EXTENDED RELEASE ORAL
Qty: 90 TABLET | OUTPATIENT
Start: 2021-07-19

## 2021-07-22 ENCOUNTER — OFFICE VISIT (OUTPATIENT)
Dept: FAMILY MEDICINE CLINIC | Facility: CLINIC | Age: 86
End: 2021-07-22

## 2021-07-22 VITALS
OXYGEN SATURATION: 93 % | SYSTOLIC BLOOD PRESSURE: 109 MMHG | HEIGHT: 68 IN | HEART RATE: 91 BPM | DIASTOLIC BLOOD PRESSURE: 66 MMHG | TEMPERATURE: 97.3 F | BODY MASS INDEX: 19.19 KG/M2 | WEIGHT: 126.6 LBS

## 2021-07-22 DIAGNOSIS — Z00.00 MEDICARE ANNUAL WELLNESS VISIT, SUBSEQUENT: Primary | ICD-10-CM

## 2021-07-22 DIAGNOSIS — Z78.0 POST-MENOPAUSAL: ICD-10-CM

## 2021-07-22 PROCEDURE — 1125F AMNT PAIN NOTED PAIN PRSNT: CPT | Performed by: FAMILY MEDICINE

## 2021-07-22 PROCEDURE — 1160F RVW MEDS BY RX/DR IN RCRD: CPT | Performed by: FAMILY MEDICINE

## 2021-07-22 PROCEDURE — G0439 PPPS, SUBSEQ VISIT: HCPCS | Performed by: FAMILY MEDICINE

## 2021-07-22 PROCEDURE — 1170F FXNL STATUS ASSESSED: CPT | Performed by: FAMILY MEDICINE

## 2021-07-22 PROCEDURE — 96160 PT-FOCUSED HLTH RISK ASSMT: CPT | Performed by: FAMILY MEDICINE

## 2021-07-22 NOTE — PROGRESS NOTES
The ABCs of the Annual Wellness Visit  Subsequent Medicare Wellness Visit    Chief Complaint   Patient presents with   • Medicare Wellness-subsequent       Subjective   History of Present Illness:  Flori Tubbs is a 89 y.o. female who presents for a Subsequent Medicare Wellness Visit.    HEALTH RISK ASSESSMENT    Recent Hospitalizations:  No hospitalization(s) within the last year.    Current Medical Providers:  Patient Care Team:  Umberto White MD as PCP - General  Umberto White MD as PCP - Family Medicine  Taurus Robles MD as Consulting Physician (Cardiology)    Smoking Status:  Social History     Tobacco Use   Smoking Status Never Smoker   Smokeless Tobacco Never Used       Alcohol Consumption:  Social History     Substance and Sexual Activity   Alcohol Use No       Depression Screen:   PHQ-2/PHQ-9 Depression Screening 7/22/2021   Little interest or pleasure in doing things 0   Feeling down, depressed, or hopeless 0   Total Score 0       Fall Risk Screen:  MILKA Fall Risk Assessment was completed, and patient is at LOW risk for falls.Assessment completed on:7/22/2021    Health Habits and Functional and Cognitive Screening:  Functional & Cognitive Status 7/22/2021   Do you have difficulty preparing food and eating? Yes   Do you have difficulty bathing yourself, getting dressed or grooming yourself? No   Do you have difficulty using the toilet? No   Do you have difficulty moving around from place to place? Yes   Do you have trouble with steps or getting out of a bed or a chair? No   Do you need help using the phone?  No   Are you deaf or do you have serious difficulty hearing?  No   Do you need help with transportation? -   Do you need help shopping? Yes   Do you need help preparing meals?  Yes   Do you need help with housework?  Yes   Do you need help with laundry? No   Do you need help taking your medications? No   Do you need help managing money? Yes   Do you ever drive or ride in a car  without wearing a seat belt? No   Have you felt unusual stress, anger or loneliness in the last month? No   Who do you live with? Alone   Do you have difficulty concentrating, remembering or making decisions? Yes         Does the patient have evidence of cognitive impairment? Yes    Asprin use counseling:Taking ASA appropriately as indicated    Age-appropriate Screening Schedule:  Refer to the list below for future screening recommendations based on patient's age, sex and/or medical conditions. Orders for these recommended tests are listed in the plan section. The patient has been provided with a written plan.    Health Maintenance   Topic Date Due   • DXA SCAN  Never done   • TDAP/TD VACCINES (1 - Tdap) Never done   • ZOSTER VACCINE (1 of 2) Never done   • INFLUENZA VACCINE  10/01/2021          The following portions of the patient's history were reviewed and updated as appropriate: past medical history, past social history, past surgical history and problem list.    Outpatient Medications Prior to Visit   Medication Sig Dispense Refill   • aspirin 81 MG EC tablet Take 1 tablet by mouth Daily. 90 tablet 3   • atorvastatin (LIPITOR) 10 MG tablet TAKE 1 TABLET EVERY DAY 90 tablet 3   • bimatoprost (LUMIGAN) 0.01 % ophthalmic drops LUMIGAN 0.01 % SOLN     • calcium-vitamin D (Oscal 500/200 D-3) 500-200 MG-UNIT per tablet Take 1 tablet by mouth Daily. 180 tablet 3   • COMBIGAN 0.2-0.5 % ophthalmic solution Administer 1 drop to both eyes 2 (Two) Times a Day.     • Cyanocobalamin (Vitamin B-12) 1000 MCG sublingual tablet Place 1,000 mcg under the tongue Daily. 100 each 4   • donepezil (Aricept) 10 MG tablet Take 1 tablet by mouth Every Night. 90 tablet 3   • MULTIPLE VITAMIN PO Take 1 tablet by mouth Daily.     • nitroglycerin (Nitrostat) 0.4 MG SL tablet Place 1 tablet under the tongue Every 5 (Five) Minutes As Needed for Chest Pain. Take no more than 3 doses in 15 minutes. 30 tablet 0   • RHOPRESSA 0.02 % solution       • Vitamin D, Ergocalciferol, 50 MCG (2000 UT) capsule Take 2,000 Units by mouth Daily. 100 capsule 4     No facility-administered medications prior to visit.       Patient Active Problem List   Diagnosis   • Chronic pain of both knees   • Nausea   • Acute nontraumatic kidney injury (CMS/HCC)   • Anaclitic depression   • Anemia, iron deficiency   • Arthritis   • Asthma   • Atypical chest pain   • Candidiasis of urogenital site   • Cellulitis   • Diverticulitis   • Chronic coronary artery disease   • Colon cancer, ascending (CMS/HCC)   • Complete uterovaginal prolapse   • Uterine prolapse   • Dyslipidemia   • Dysphagia   • Dyspnea on exertion   • Dyspnea   • Elevated blood pressure reading without diagnosis of hypertension   • Encounter for long-term (current) use of other medications   • Encounter for routine gynecological examination   • Encounter for general adult medical examination without abnormal findings   • Fatigue   • Weakness   • Glaucoma   • History of prosthetic heart valve   • Hyperkalemia   • Hypertension   • Hypotension   • Localized infection of skin   • Mediastinal mass   • Multiple thyroid nodules   • Myalgia   • Other screening mammogram   • Palpitations   • Panic attack   • Postnasal drip   • Sebaceous cyst   • Skin disorder   • ST elevation (STEMI) myocardial infarction (CMS/HCC)   • Cerebrovascular accident (CVA) (CMS/HCC)   • Transient ischemic attack   • Urge incontinence   • Vocal cord paralysis   • Pain in joint involving lower leg   • Anxiety   • Medicare annual wellness visit, subsequent   • Dysuria   • Urge incontinence of urine   • Pressure injury of sacral region, stage 1   • Urinary incontinence   • Chest wall pain   • Right shoulder pain   • Memory impairment   • Auditory hallucination   • History of UTI - f/u    • Post-menopausal       Advanced Care Planning:  ACP discussion was held with the patient during this visit. Patient has an advance directive (not in EMR), copy  "requested.    Review of Systems   Constitutional: Negative for appetite change and fatigue.   Respiratory: Negative for shortness of breath and wheezing.    Cardiovascular: Negative for chest pain.   Gastrointestinal: Negative for abdominal pain.   Endocrine: Negative for polydipsia and polyuria.   Genitourinary: Negative for dysuria.   Neurological: Negative for dizziness and headaches.   Psychiatric/Behavioral: Negative for sleep disturbance. The patient is not nervous/anxious.        Compared to one year ago, the patient feels her physical health is the same.  Compared to one year ago, the patient feels her mental health is the same.    Reviewed chart for potential of high risk medication in the elderly: yes  Reviewed chart for potential of harmful drug interactions in the elderly:yes    Objective         Vitals:    07/22/21 1107   BP: 109/66   Pulse: 91   Temp: 97.3 °F (36.3 °C)   TempSrc: Temporal   SpO2: 93%   Weight: 57.4 kg (126 lb 9.6 oz)   Height: 172.7 cm (68\")       Body mass index is 19.25 kg/m².  Discussed the patient's BMI with her. The BMI is in the acceptable range.    Physical Exam  Vitals reviewed.   Constitutional:       General: She is not in acute distress.  Cardiovascular:      Heart sounds: Normal heart sounds.   Pulmonary:      Effort: Pulmonary effort is normal.      Breath sounds: Normal breath sounds.   Neurological:      Mental Status: She is alert and oriented to person, place, and time.   Psychiatric:         Mood and Affect: Mood normal.         Lab Results   Component Value Date    TRIG 91 06/09/2021    HDL 62 (H) 06/09/2021    LDL 56 06/09/2021    VLDL 17 06/09/2021    HGBA1C 5.3 06/23/2021        Assessment/Plan   Medicare Risks and Personalized Health Plan  CMS Preventative Services Quick Reference  Advance Directive Discussion  Depression/Dysphoria  Fall Risk  Immunizations Discussed/Encouraged (specific immunizations; Tdap and Shingrix )  Osteoporosis Risk    The above " risks/problems have been discussed with the patient.  Pertinent information has been shared with the patient in the After Visit Summary.  Follow up plans and orders are seen below in the Assessment/Plan Section.    Diagnoses and all orders for this visit:    1. Medicare annual wellness visit, subsequent (Primary)  Assessment & Plan:  Discussed preventive care protocol, healthy diet and  importance of regular exercise   Annual flu shots are recommended every year in the fall.  Please check with your insurance and get Shingrix vaccination series at your local pharmacy    Patient Counseling:  --Nutrition: Stressed importance of moderation in sodium/caffeine intake, saturated fat and cholesterol, caloric balance, sufficient intake of fresh fruits, vegetables, fiber, calcium, iron  --Exercise: Stressed the importance of regular exercise.   --Dental health: Discussed importance of regular tooth brushing, flossing, and dental visits.  --Immunizations reviewed.              -- lab result reviewed.      2. Post-menopausal  -     DEXA Bone Density Axial    Follow Up:  Return in about 3 months (around 10/22/2021) for Recheck.     An After Visit Summary and PPPS were given to the patient.

## 2021-07-22 NOTE — ASSESSMENT & PLAN NOTE
Discussed preventive care protocol, healthy diet and  importance of regular exercise   Annual flu shots are recommended every year in the fall.  Please check with your insurance and get Shingrix vaccination series at your local pharmacy    Patient Counseling:  --Nutrition: Stressed importance of moderation in sodium/caffeine intake, saturated fat and cholesterol, caloric balance, sufficient intake of fresh fruits, vegetables, fiber, calcium, iron  --Exercise: Stressed the importance of regular exercise.   --Dental health: Discussed importance of regular tooth brushing, flossing, and dental visits.  --Immunizations reviewed.              -- lab result reviewed.

## 2021-07-24 PROBLEM — Z78.0 POST-MENOPAUSAL: Status: ACTIVE | Noted: 2021-07-24

## 2021-07-28 RX ORDER — ASPIRIN 81 MG/1
TABLET, COATED ORAL
Qty: 90 TABLET | Refills: 3 | Status: SHIPPED | OUTPATIENT
Start: 2021-07-28 | End: 2021-10-15 | Stop reason: HOSPADM

## 2021-08-30 ENCOUNTER — TELEPHONE (OUTPATIENT)
Dept: CARDIOLOGY | Facility: CLINIC | Age: 86
End: 2021-08-30

## 2021-08-31 RX ORDER — AMLODIPINE BESYLATE 10 MG/1
10 TABLET ORAL DAILY
Qty: 90 TABLET | Refills: 3 | Status: SHIPPED | OUTPATIENT
Start: 2021-08-31 | End: 2021-10-11

## 2021-08-31 NOTE — TELEPHONE ENCOUNTER
Pt's son Sherman called,  He said we called his sister but he is the one that knows what's going on.     Please call him back at [phone]    Pt was taken off Metoprolol at LOV in June.       Top number of BP is high.  Dizziness this week.    Please advise.

## 2021-09-26 ENCOUNTER — HOSPITAL ENCOUNTER (EMERGENCY)
Facility: HOSPITAL | Age: 86
Discharge: HOME | End: 2021-09-27
Attending: EMERGENCY MEDICINE | Admitting: EMERGENCY MEDICINE

## 2021-09-26 DIAGNOSIS — N39.0 ACUTE UTI: Primary | ICD-10-CM

## 2021-09-26 DIAGNOSIS — Z20.822 LAB TEST NEGATIVE FOR COVID-19 VIRUS: ICD-10-CM

## 2021-09-26 DIAGNOSIS — R00.2 PALPITATIONS: ICD-10-CM

## 2021-09-26 DIAGNOSIS — R53.1 WEAKNESS: ICD-10-CM

## 2021-09-26 LAB
ALBUMIN SERPL-MCNC: 3.9 G/DL (ref 3.5–5.2)
ALBUMIN/GLOB SERPL: 1.4 G/DL
ALP SERPL-CCNC: 59 U/L (ref 39–117)
ALT SERPL W P-5'-P-CCNC: 11 U/L (ref 1–33)
ANION GAP SERPL CALCULATED.3IONS-SCNC: 11 MMOL/L (ref 5–15)
AST SERPL-CCNC: 13 U/L (ref 1–32)
BASOPHILS # BLD AUTO: 0 10*3/MM3 (ref 0–0.2)
BASOPHILS NFR BLD AUTO: 0.6 % (ref 0–1.5)
BILIRUB SERPL-MCNC: 0.3 MG/DL (ref 0–1.2)
BUN SERPL-MCNC: 17 MG/DL (ref 8–23)
BUN/CREAT SERPL: 19.3 (ref 7–25)
CALCIUM SPEC-SCNC: 9.4 MG/DL (ref 8.6–10.5)
CHLORIDE SERPL-SCNC: 108 MMOL/L (ref 98–107)
CO2 SERPL-SCNC: 24 MMOL/L (ref 22–29)
CREAT SERPL-MCNC: 0.88 MG/DL (ref 0.57–1)
DEPRECATED RDW RBC AUTO: 39.4 FL (ref 37–54)
EOSINOPHIL # BLD AUTO: 0.7 10*3/MM3 (ref 0–0.4)
EOSINOPHIL NFR BLD AUTO: 9.3 % (ref 0.3–6.2)
ERYTHROCYTE [DISTWIDTH] IN BLOOD BY AUTOMATED COUNT: 12.3 % (ref 12.3–15.4)
GFR SERPL CREATININE-BSD FRML MDRD: 61 ML/MIN/1.73
GLOBULIN UR ELPH-MCNC: 2.8 GM/DL
GLUCOSE SERPL-MCNC: 95 MG/DL (ref 65–99)
HCT VFR BLD AUTO: 36.9 % (ref 34–46.6)
HGB BLD-MCNC: 13 G/DL (ref 12–15.9)
LYMPHOCYTES # BLD AUTO: 2.1 10*3/MM3 (ref 0.7–3.1)
LYMPHOCYTES NFR BLD AUTO: 26.8 % (ref 19.6–45.3)
MAGNESIUM SERPL-MCNC: 2 MG/DL (ref 1.6–2.4)
MCH RBC QN AUTO: 32.3 PG (ref 26.6–33)
MCHC RBC AUTO-ENTMCNC: 35.1 G/DL (ref 31.5–35.7)
MCV RBC AUTO: 92 FL (ref 79–97)
MONOCYTES # BLD AUTO: 0.6 10*3/MM3 (ref 0.1–0.9)
MONOCYTES NFR BLD AUTO: 8 % (ref 5–12)
NEUTROPHILS NFR BLD AUTO: 4.3 10*3/MM3 (ref 1.7–7)
NEUTROPHILS NFR BLD AUTO: 55.3 % (ref 42.7–76)
NRBC BLD AUTO-RTO: 0 /100 WBC (ref 0–0.2)
PLATELET # BLD AUTO: 292 10*3/MM3 (ref 140–450)
PMV BLD AUTO: 7.8 FL (ref 6–12)
POTASSIUM SERPL-SCNC: 4.3 MMOL/L (ref 3.5–5.2)
PROT SERPL-MCNC: 6.7 G/DL (ref 6–8.5)
RBC # BLD AUTO: 4.02 10*6/MM3 (ref 3.77–5.28)
SODIUM SERPL-SCNC: 143 MMOL/L (ref 136–145)
WBC # BLD AUTO: 7.7 10*3/MM3 (ref 3.4–10.8)

## 2021-09-26 PROCEDURE — 81001 URINALYSIS AUTO W/SCOPE: CPT | Performed by: EMERGENCY MEDICINE

## 2021-09-26 PROCEDURE — 85025 COMPLETE CBC W/AUTO DIFF WBC: CPT | Performed by: EMERGENCY MEDICINE

## 2021-09-26 PROCEDURE — C9803 HOPD COVID-19 SPEC COLLECT: HCPCS

## 2021-09-26 PROCEDURE — 93005 ELECTROCARDIOGRAM TRACING: CPT | Performed by: EMERGENCY MEDICINE

## 2021-09-26 PROCEDURE — U0003 INFECTIOUS AGENT DETECTION BY NUCLEIC ACID (DNA OR RNA); SEVERE ACUTE RESPIRATORY SYNDROME CORONAVIRUS 2 (SARS-COV-2) (CORONAVIRUS DISEASE [COVID-19]), AMPLIFIED PROBE TECHNIQUE, MAKING USE OF HIGH THROUGHPUT TECHNOLOGIES AS DESCRIBED BY CMS-2020-01-R: HCPCS | Performed by: EMERGENCY MEDICINE

## 2021-09-26 PROCEDURE — 99284 EMERGENCY DEPT VISIT MOD MDM: CPT

## 2021-09-26 PROCEDURE — 93005 ELECTROCARDIOGRAM TRACING: CPT

## 2021-09-26 PROCEDURE — 84443 ASSAY THYROID STIM HORMONE: CPT | Performed by: EMERGENCY MEDICINE

## 2021-09-26 PROCEDURE — 87086 URINE CULTURE/COLONY COUNT: CPT | Performed by: EMERGENCY MEDICINE

## 2021-09-26 PROCEDURE — 80053 COMPREHEN METABOLIC PANEL: CPT | Performed by: EMERGENCY MEDICINE

## 2021-09-26 PROCEDURE — 99283 EMERGENCY DEPT VISIT LOW MDM: CPT

## 2021-09-26 PROCEDURE — 83735 ASSAY OF MAGNESIUM: CPT | Performed by: EMERGENCY MEDICINE

## 2021-09-26 PROCEDURE — 84484 ASSAY OF TROPONIN QUANT: CPT | Performed by: EMERGENCY MEDICINE

## 2021-09-27 VITALS
SYSTOLIC BLOOD PRESSURE: 158 MMHG | HEART RATE: 60 BPM | TEMPERATURE: 97.6 F | WEIGHT: 128.09 LBS | RESPIRATION RATE: 18 BRPM | OXYGEN SATURATION: 96 % | DIASTOLIC BLOOD PRESSURE: 75 MMHG | BODY MASS INDEX: 20.1 KG/M2 | HEIGHT: 67 IN

## 2021-09-27 LAB
BACTERIA UR QL AUTO: ABNORMAL /HPF
BILIRUB UR QL STRIP: NEGATIVE
CLARITY UR: ABNORMAL
COLOR UR: YELLOW
GLUCOSE UR STRIP-MCNC: NEGATIVE MG/DL
HGB UR QL STRIP.AUTO: NEGATIVE
HYALINE CASTS UR QL AUTO: ABNORMAL /LPF
KETONES UR QL STRIP: NEGATIVE
LEUKOCYTE ESTERASE UR QL STRIP.AUTO: ABNORMAL
NITRITE UR QL STRIP: NEGATIVE
PH UR STRIP.AUTO: 6 [PH] (ref 5–8)
PROT UR QL STRIP: NEGATIVE
QT INTERVAL: 361 MS
RBC # UR: ABNORMAL /HPF
REF LAB TEST METHOD: ABNORMAL
SARS-COV-2 RNA PNL SPEC NAA+PROBE: NOT DETECTED
SP GR UR STRIP: 1.02 (ref 1–1.03)
SQUAMOUS #/AREA URNS HPF: ABNORMAL /HPF
TROPONIN T SERPL-MCNC: <0.01 NG/ML (ref 0–0.03)
TSH SERPL DL<=0.05 MIU/L-ACNC: 0.58 UIU/ML (ref 0.27–4.2)
UROBILINOGEN UR QL STRIP: ABNORMAL
WBC UR QL AUTO: ABNORMAL /HPF
YEAST URNS QL MICRO: ABNORMAL /HPF

## 2021-09-27 RX ORDER — NITROFURANTOIN 25; 75 MG/1; MG/1
100 CAPSULE ORAL 2 TIMES DAILY
Qty: 14 CAPSULE | Refills: 0 | Status: SHIPPED | OUTPATIENT
Start: 2021-09-27 | End: 2021-10-11

## 2021-09-27 NOTE — ED PROVIDER NOTES
Subjective   89-year-old female with several months of fatigue, postural dizziness, decreased activity per son, decreased p.o. intake.  Patient has intermittent palpitations described as mild fluttering in her chest at rest for the past several weeks.  Patient had episode earlier tonight that has resolved.  No aggravating alleviating factors.  No chest pain or shortness of air or cough or fever or vomiting or diarrhea.          Review of Systems   Constitutional: Positive for fatigue.   Cardiovascular: Positive for palpitations.   Neurological: Positive for light-headedness.   All other systems reviewed and are negative.      Past Medical History:   Diagnosis Date   • Arthritis    • CAD (coronary artery disease)    • Cancer (CMS/HCC)    • CVA (cerebral vascular accident) (CMS/HCC)    • Difficulty walking    • Hypertension    • Memory loss    • Palpitation    • ST elevation    • TIA (transient ischemic attack)    • Weakness        Allergies   Allergen Reactions   • Epinephrine Unknown (See Comments)   • Sulfamethoxazole-Trimethoprim Rash       Past Surgical History:   Procedure Laterality Date   • CATARACT EXTRACTION     •  SECTION     • COLON SURGERY  2018   • MASS EXCISION  colon       Family History   Problem Relation Age of Onset   • Cancer Mother    • Cancer Father    • Cancer Sister        Social History     Socioeconomic History   • Marital status:      Spouse name: Not on file   • Number of children: Not on file   • Years of education: Not on file   • Highest education level: Not on file   Tobacco Use   • Smoking status: Never Smoker   • Smokeless tobacco: Never Used   Vaping Use   • Vaping Use: Never used   Substance and Sexual Activity   • Alcohol use: No   • Drug use: No   • Sexual activity: Defer           Objective   Physical Exam  Constitutional:       Comments: Elderly female no acute distress   HENT:      Head: Normocephalic and atraumatic.      Mouth/Throat:      Mouth: Mucous  membranes are moist.      Pharynx: Oropharynx is clear.   Eyes:      Extraocular Movements: Extraocular movements intact.      Conjunctiva/sclera: Conjunctivae normal.      Pupils: Pupils are equal, round, and reactive to light.   Cardiovascular:      Rate and Rhythm: Normal rate and regular rhythm.      Heart sounds: Normal heart sounds.   Pulmonary:      Effort: Pulmonary effort is normal.      Breath sounds: Normal breath sounds.   Abdominal:      General: Bowel sounds are normal. There is no distension.      Palpations: Abdomen is soft.      Tenderness: There is no abdominal tenderness.   Musculoskeletal:         General: No swelling or tenderness. Normal range of motion.   Skin:     General: Skin is warm and dry.      Capillary Refill: Capillary refill takes less than 2 seconds.   Neurological:      Mental Status: Mental status is at baseline.      Cranial Nerves: No cranial nerve deficit.      Sensory: No sensory deficit.      Motor: No weakness.   Psychiatric:         Mood and Affect: Mood normal.         Behavior: Behavior normal.         Procedures           ED Course  ED Course as of Sep 27 0104   Sun Sep 26, 2021   2305 EKG interpretation: Normal sinus rhythm, rate 76, no acute ST change    [JR]      ED Course User Index  [JR] Tomi Brown MD                                           [facility]  Number of Diagnoses or Management Options  Acute UTI  Lab test negative for COVID-19 virus  Palpitations  Weakness  Diagnosis management comments: Results for orders placed or performed during the hospital encounter of 09/26/21  -COVID-19,CEPHEID/DEBBIE/BDMAX,COR/ROSENDO/PAD/CARRIE IN-HOUSE(OR EMERGENT/ADD-ON),NP SWAB IN TRANSPORT MEDIA 3-4 HR TAT, RT-PCR - Swab, Nasopharynx  Specimen: Nasopharynx; Swab       Result                      Value             Ref Range           COVID19                     Not Detected      Not Detected*  -Comprehensive Metabolic Panel  Specimen: Blood       Result                      Value              Ref Range           Glucose                     95                65 - 99 mg/dL       BUN                         17                8 - 23 mg/dL        Creatinine                  0.88              0.57 - 1.00 *       Sodium                      143               136 - 145 mm*       Potassium                   4.3               3.5 - 5.2 mm*       Chloride                    108 (H)           98 - 107 mmo*       CO2                         24.0              22.0 - 29.0 *       Calcium                     9.4               8.6 - 10.5 m*       Total Protein               6.7               6.0 - 8.5 g/*       Albumin                     3.90              3.50 - 5.20 *       ALT (SGPT)                  11                1 - 33 U/L          AST (SGOT)                  13                1 - 32 U/L          Alkaline Phosphatase        59                39 - 117 U/L        Total Bilirubin             0.3               0.0 - 1.2 mg*       eGFR Non  Amer       61                >60 mL/min/1*       Globulin                    2.8               gm/dL               A/G Ratio                   1.4               g/dL                BUN/Creatinine Ratio        19.3              7.0 - 25.0          Anion Gap                   11.0              5.0 - 15.0 m*  -Urinalysis With Culture If Indicated - Urine, Clean Catch  Specimen: Urine, Clean Catch       Result                      Value             Ref Range           Color, UA                   Yellow            Yellow, Straw       Appearance, UA              Hazy (A)          Clear               pH, UA                      6.0               5.0 - 8.0           Specific [place], UA        1.018             1.005 - 1.030       Glucose, UA                 Negative          Negative            Ketones, UA                 Negative          Negative            Bilirubin, UA               Negative          Negative            Blood, UA                   Negative           Negative            Protein, UA                 Negative          Negative            Leuk Esterase, UA                             Negative        Small (1+) (A)       Nitrite, UA                 Negative          Negative            Urobilinogen, UA            0.2 E.U./dL       0.2 - 1.0 E.*  -Troponin  Specimen: Blood       Result                      Value             Ref Range           Troponin T                  <0.010            0.000 - 0.03*  -TSH  Specimen: Blood       Result                      Value             Ref Range           TSH                         0.578             0.270 - 4.20*  -Magnesium  Specimen: Blood       Result                      Value             Ref Range           Magnesium                   2.0               1.6 - 2.4 mg*  -CBC Auto Differential  Specimen: Blood       Result                      Value             Ref Range           WBC                         7.70              3.40 - 10.80*       RBC                         4.02              3.77 - 5.28 *       Hemoglobin                  13.0              12.0 - 15.9 *       Hematocrit                  36.9              34.0 - 46.6 %       MCV                         92.0              79.0 - 97.0 *       MCH                         32.3              26.6 - 33.0 *       MCHC                        35.1              31.5 - 35.7 *       RDW                         12.3              12.3 - 15.4 %       RDW-SD                      39.4              37.0 - 54.0 *       MPV                         7.8               6.0 - 12.0 fL       Platelets                   292               140 - 450 10*       Neutrophil %                55.3              42.7 - 76.0 %       Lymphocyte %                26.8              19.6 - 45.3 %       Monocyte %                  8.0               5.0 - 12.0 %        Eosinophil %                9.3 (H)           0.3 - 6.2 %         Basophil %                  0.6               0.0 - 1.5 %          Neutrophils, Absolute       4.30              1.70 - 7.00 *       Lymphocytes, Absolute       2.10              0.70 - 3.10 *       Monocytes, Absolute         0.60              0.10 - 0.90 *       Eosinophils, Absolute       0.70 (H)          0.00 - 0.40 *       Basophils, Absolute         0.00              0.00 - 0.20 *       nRBC                        0.0               0.0 - 0.2 /1*  -Urinalysis, Microscopic Only - Urine, Clean Catch  Specimen: Urine, Clean Catch       Result                      Value             Ref Range           RBC, UA                     3-5 (A)           None Seen /H*       WBC, UA                     13-20 (A)         None Seen /H*       Bacteria, UA                None Seen         None Seen /H*       Squamous Epithelial Ce*     0-2               None Seen, 0*       Yeast, UA                                     None Seen /H*   Small/1+ Budding Yeast w/Hyphae       Hyaline Casts, UA           None Seen         None Seen /L*       Methodology                                                   Automated Microscopy  -ECG 12 Lead       Result                      Value             Ref Range           QT Interval                 361               ms               Unremarkable laboratory evaluation with exception of UTI, will culture urine, placed on Macrobid.  Son states patient has had unremarkable Holter monitors with similar palpitations in the past thought to be anxiety in origin.       Amount and/or Complexity of Data Reviewed  Clinical lab tests: ordered and reviewed        Final diagnoses:   Acute UTI   Weakness   Palpitations   Lab test negative for COVID-19 virus       ED Disposition  ED Disposition     ED Disposition Condition Comment    Discharge Stable           Umberto White MD  [address] [address] [phone]    Schedule an appointment as soon as possible for a visit            Medication List      New Prescriptions    nitrofurantoin  (macrocrystal-monohydrate) 100 MG capsule  Commonly known as: MACROBID  Take 1 capsule by mouth 2 (Two) Times a Day.           Where to Get Your Medications      These medications were sent to [facility]/pharmacy #[id number] - [place], [place] - [address] [facility] [address] - [phone]  - [phone]  [address] [address], [place] [address]    Phone: [phone]   · nitrofurantoin (macrocrystal-monohydrate) 100 MG capsule          Tomi Brown MD  09/27/21 0104

## 2021-09-28 ENCOUNTER — PATIENT OUTREACH (OUTPATIENT)
Dept: CASE MANAGEMENT | Facility: OTHER | Age: 86
End: 2021-09-28

## 2021-09-28 LAB — BACTERIA SPEC AEROBE CULT: NORMAL

## 2021-09-28 NOTE — OUTREACH NOTE
Ambulatory Case Management Note    General & Health Literacy Assessment    Questions/Answers      Most Recent Value   Assessment Completed With  Children   Living Arrangement  Alone   Type of Residence  Assisted Living   Bed or Wheelchair Confined  No   Difficulty Keeping Appointments  No        Care Evaluation    Questions/Answers      Most Recent Value   Other Patient Education/Resources   24/7 [facility] Nurse Call Line   24/7 Nurse Call Line Education Method  Verbal        SDOH updated and reviewed with the patient during this program:     Financial Resource Strain: Low Risk    • Difficulty of Paying Living Expenses: Not hard at all       Food Insecurity: No Food Insecurity   • Worried About Running Out of Food in the Last Year: Never true   • Ran Out of Food in the Last Year: Never true       Transportation Needs: No Transportation Needs   • Lack of Transportation (Medical): No   • Lack of Transportation (Non-Medical): No     Patient Outreach    Pt discharged from [facility] ED on 9/27/21, seen for fatigue, postural dizziness, decreased activity and po intake, palpitations, diagnosed with acute UTI. RN-ACM outreach call made to pt, spoke briefly with pt's daughter Erlinda. Explained role of RN-ACM. Erlinda states pt is doing ok, feeling a little better. She reports symptoms have been ongoing. Reviewed ED AVS. Education provided. Erlinda reports pt is taking antibiotic rx as directed. RN-ACM emphasized importance of antibiotic completion. Reviewed SDOH. Erlinda reports pt has good family support. Pt lives at [facility]. Discussed follow up. Erlinda reports she will call to schedule pt's PCP follow up appt. No questions or concerns per Erlinda. No needs identified at this time. Erlinda thanks RN-ACM for calling and ended call. Follow up outreach as needed.      Edd Lynne RN  Ambulatory Case Management    9/28/2021, 13:00 EDT

## 2021-10-07 RX ORDER — DONEPEZIL HYDROCHLORIDE 10 MG/1
10 TABLET, FILM COATED ORAL NIGHTLY
Qty: 90 TABLET | Refills: 3 | Status: SHIPPED | OUTPATIENT
Start: 2021-10-07 | End: 2022-10-07

## 2021-10-07 NOTE — TELEPHONE ENCOUNTER
Caller: MIREYA WITH [facility]  [phone]    Medication requested (name and dosage):   donepezil (Aricept) 10 MG tablet    Pharmacy where request should be sent: [facility]     Best call back number: [phone]    Does the patient have less than a 3 day supply:  [] Yes  [] No  ASKED PHARM REP HOW MUCH MEDICATION PT STILL HAD AND SHE DIDN'T KNOW    Brad Goldstein Rep   10/07/21 09:39 EDT

## 2021-10-11 ENCOUNTER — INPATIENT HOSPITAL (AMBULATORY)
Dept: URBAN - METROPOLITAN AREA HOSPITAL 84 | Facility: HOSPITAL | Age: 86
End: 2021-10-11
Payer: MEDICARE

## 2021-10-11 ENCOUNTER — HOSPITAL ENCOUNTER (INPATIENT)
Facility: HOSPITAL | Age: 86
LOS: 4 days | Discharge: NURSING, REHAB OR LONG-TERM CARE | End: 2021-10-15
Attending: EMERGENCY MEDICINE | Admitting: HOSPITALIST

## 2021-10-11 ENCOUNTER — TELEPHONE (OUTPATIENT)
Dept: FAMILY MEDICINE CLINIC | Facility: CLINIC | Age: 86
End: 2021-10-11

## 2021-10-11 DIAGNOSIS — K92.2 GASTROINTESTINAL HEMORRHAGE, UNSPECIFIED GASTROINTESTINAL HEMORRHAGE TYPE: ICD-10-CM

## 2021-10-11 DIAGNOSIS — K92.2 LOWER GI BLEED: Primary | ICD-10-CM

## 2021-10-11 DIAGNOSIS — K62.5 HEMORRHAGE OF ANUS AND RECTUM: ICD-10-CM

## 2021-10-11 DIAGNOSIS — Z85.038 PERSONAL HISTORY OF OTHER MALIGNANT NEOPLASM OF LARGE INTEST: ICD-10-CM

## 2021-10-11 DIAGNOSIS — D50.0 IRON DEFICIENCY ANEMIA SECONDARY TO BLOOD LOSS (CHRONIC): ICD-10-CM

## 2021-10-11 PROBLEM — R44.0 AUDITORY HALLUCINATION: Status: RESOLVED | Noted: 2021-03-14 | Resolved: 2021-10-11

## 2021-10-11 PROBLEM — R30.0 DYSURIA: Status: RESOLVED | Noted: 2020-08-19 | Resolved: 2021-10-11

## 2021-10-11 PROBLEM — E87.5 HYPERKALEMIA: Status: RESOLVED | Noted: 2017-05-02 | Resolved: 2021-10-11

## 2021-10-11 PROBLEM — I10 PRIMARY HYPERTENSION: Chronic | Status: ACTIVE | Noted: 2019-09-12

## 2021-10-11 PROBLEM — R55 SYNCOPE: Status: ACTIVE | Noted: 2021-10-11

## 2021-10-11 PROBLEM — N17.9 ACUTE NONTRAUMATIC KIDNEY INJURY (HCC): Status: RESOLVED | Noted: 2017-05-02 | Resolved: 2021-10-11

## 2021-10-11 PROBLEM — K57.92 DIVERTICULITIS: Status: RESOLVED | Noted: 2019-09-12 | Resolved: 2021-10-11

## 2021-10-11 PROBLEM — D62 ACUTE BLOOD LOSS ANEMIA: Status: ACTIVE | Noted: 2021-10-11

## 2021-10-11 PROBLEM — Z87.440 HISTORY OF UTI: Status: RESOLVED | Noted: 2021-07-06 | Resolved: 2021-10-11

## 2021-10-11 PROBLEM — I21.3 ST ELEVATION (STEMI) MYOCARDIAL INFARCTION (HCC): Status: RESOLVED | Noted: 2019-09-12 | Resolved: 2021-10-11

## 2021-10-11 PROBLEM — R11.0 NAUSEA: Status: RESOLVED | Noted: 2019-07-07 | Resolved: 2021-10-11

## 2021-10-11 PROBLEM — Z86.73 HISTORY OF CVA (CEREBROVASCULAR ACCIDENT): Status: ACTIVE | Noted: 2019-09-12

## 2021-10-11 PROBLEM — R41.3 MEMORY IMPAIRMENT: Chronic | Status: ACTIVE | Noted: 2021-03-14

## 2021-10-11 LAB
ABO GROUP BLD: NORMAL
ALBUMIN SERPL-MCNC: 3.3 G/DL (ref 3.5–5.2)
ALBUMIN/GLOB SERPL: 1.5 G/DL
ALP SERPL-CCNC: 44 U/L (ref 39–117)
ALT SERPL W P-5'-P-CCNC: 10 U/L (ref 1–33)
ANION GAP SERPL CALCULATED.3IONS-SCNC: 11 MMOL/L (ref 5–15)
APTT PPP: <20 SECONDS (ref 24–31)
AST SERPL-CCNC: 13 U/L (ref 1–32)
BASOPHILS # BLD AUTO: 0.1 10*3/MM3 (ref 0–0.2)
BASOPHILS NFR BLD AUTO: 0.4 % (ref 0–1.5)
BILIRUB SERPL-MCNC: 0.3 MG/DL (ref 0–1.2)
BLD GP AB SCN SERPL QL: NEGATIVE
BUN SERPL-MCNC: 32 MG/DL (ref 8–23)
BUN/CREAT SERPL: 31.4 (ref 7–25)
CALCIUM SPEC-SCNC: 8.8 MG/DL (ref 8.6–10.5)
CHLORIDE SERPL-SCNC: 111 MMOL/L (ref 98–107)
CO2 SERPL-SCNC: 22 MMOL/L (ref 22–29)
CREAT SERPL-MCNC: 1.02 MG/DL (ref 0.57–1)
DEPRECATED RDW RBC AUTO: 43.8 FL (ref 37–54)
EOSINOPHIL # BLD AUTO: 0.2 10*3/MM3 (ref 0–0.4)
EOSINOPHIL NFR BLD AUTO: 1.5 % (ref 0.3–6.2)
ERYTHROCYTE [DISTWIDTH] IN BLOOD BY AUTOMATED COUNT: 13.2 % (ref 12.3–15.4)
GFR SERPL CREATININE-BSD FRML MDRD: 51 ML/MIN/1.73
GLOBULIN UR ELPH-MCNC: 2.2 GM/DL
GLUCOSE SERPL-MCNC: 147 MG/DL (ref 65–99)
HCT VFR BLD AUTO: 19.5 % (ref 34–46.6)
HCT VFR BLD AUTO: 24.7 % (ref 34–46.6)
HCT VFR BLD AUTO: 25.3 % (ref 34–46.6)
HGB BLD-MCNC: 6.6 G/DL (ref 12–15.9)
HGB BLD-MCNC: 8.3 G/DL (ref 12–15.9)
HGB BLD-MCNC: 8.4 G/DL (ref 12–15.9)
HOLD SPECIMEN: NORMAL
INR PPP: 0.99 (ref 0.93–1.1)
LYMPHOCYTES # BLD AUTO: 2.2 10*3/MM3 (ref 0.7–3.1)
LYMPHOCYTES NFR BLD AUTO: 15.9 % (ref 19.6–45.3)
MCH RBC QN AUTO: 30.8 PG (ref 26.6–33)
MCHC RBC AUTO-ENTMCNC: 33.3 G/DL (ref 31.5–35.7)
MCV RBC AUTO: 92.7 FL (ref 79–97)
MONOCYTES # BLD AUTO: 0.5 10*3/MM3 (ref 0.1–0.9)
MONOCYTES NFR BLD AUTO: 3.8 % (ref 5–12)
NEUTROPHILS NFR BLD AUTO: 11 10*3/MM3 (ref 1.7–7)
NEUTROPHILS NFR BLD AUTO: 78.4 % (ref 42.7–76)
NRBC BLD AUTO-RTO: 0.1 /100 WBC (ref 0–0.2)
PLATELET # BLD AUTO: 227 10*3/MM3 (ref 140–450)
PMV BLD AUTO: 8.2 FL (ref 6–12)
POTASSIUM SERPL-SCNC: 4.7 MMOL/L (ref 3.5–5.2)
PROT SERPL-MCNC: 5.5 G/DL (ref 6–8.5)
PROTHROMBIN TIME: 11 SECONDS (ref 9.6–11.7)
QT INTERVAL: 353 MS
RBC # BLD AUTO: 2.73 10*6/MM3 (ref 3.77–5.28)
RH BLD: POSITIVE
SARS-COV-2 RNA PNL SPEC NAA+PROBE: NOT DETECTED
SODIUM SERPL-SCNC: 144 MMOL/L (ref 136–145)
T&S EXPIRATION DATE: NORMAL
TROPONIN T SERPL-MCNC: <0.01 NG/ML (ref 0–0.03)
TROPONIN T SERPL-MCNC: <0.01 NG/ML (ref 0–0.03)
WBC # BLD AUTO: 14.1 10*3/MM3 (ref 3.4–10.8)
WHOLE BLOOD HOLD SPECIMEN: NORMAL

## 2021-10-11 PROCEDURE — 85014 HEMATOCRIT: CPT | Performed by: HOSPITALIST

## 2021-10-11 PROCEDURE — 85018 HEMOGLOBIN: CPT | Performed by: NURSE PRACTITIONER

## 2021-10-11 PROCEDURE — 86900 BLOOD TYPING SEROLOGIC ABO: CPT

## 2021-10-11 PROCEDURE — U0003 INFECTIOUS AGENT DETECTION BY NUCLEIC ACID (DNA OR RNA); SEVERE ACUTE RESPIRATORY SYNDROME CORONAVIRUS 2 (SARS-COV-2) (CORONAVIRUS DISEASE [COVID-19]), AMPLIFIED PROBE TECHNIQUE, MAKING USE OF HIGH THROUGHPUT TECHNOLOGIES AS DESCRIBED BY CMS-2020-01-R: HCPCS | Performed by: HOSPITALIST

## 2021-10-11 PROCEDURE — 93005 ELECTROCARDIOGRAM TRACING: CPT | Performed by: EMERGENCY MEDICINE

## 2021-10-11 PROCEDURE — 99285 EMERGENCY DEPT VISIT HI MDM: CPT

## 2021-10-11 PROCEDURE — 85018 HEMOGLOBIN: CPT | Performed by: HOSPITALIST

## 2021-10-11 PROCEDURE — 36415 COLL VENOUS BLD VENIPUNCTURE: CPT | Performed by: EMERGENCY MEDICINE

## 2021-10-11 PROCEDURE — 84484 ASSAY OF TROPONIN QUANT: CPT | Performed by: NURSE PRACTITIONER

## 2021-10-11 PROCEDURE — 99221 1ST HOSP IP/OBS SF/LOW 40: CPT | Performed by: NURSE PRACTITIONER

## 2021-10-11 PROCEDURE — 86923 COMPATIBILITY TEST ELECTRIC: CPT

## 2021-10-11 PROCEDURE — 99222 1ST HOSP IP/OBS MODERATE 55: CPT | Performed by: HOSPITALIST

## 2021-10-11 PROCEDURE — 86901 BLOOD TYPING SEROLOGIC RH(D): CPT

## 2021-10-11 PROCEDURE — 36430 TRANSFUSION BLD/BLD COMPNT: CPT

## 2021-10-11 PROCEDURE — 80053 COMPREHEN METABOLIC PANEL: CPT | Performed by: EMERGENCY MEDICINE

## 2021-10-11 PROCEDURE — 86901 BLOOD TYPING SEROLOGIC RH(D): CPT | Performed by: EMERGENCY MEDICINE

## 2021-10-11 PROCEDURE — 86850 RBC ANTIBODY SCREEN: CPT | Performed by: EMERGENCY MEDICINE

## 2021-10-11 PROCEDURE — 85730 THROMBOPLASTIN TIME PARTIAL: CPT | Performed by: NURSE PRACTITIONER

## 2021-10-11 PROCEDURE — 85610 PROTHROMBIN TIME: CPT | Performed by: NURSE PRACTITIONER

## 2021-10-11 PROCEDURE — 85014 HEMATOCRIT: CPT | Performed by: NURSE PRACTITIONER

## 2021-10-11 PROCEDURE — P9016 RBC LEUKOCYTES REDUCED: HCPCS

## 2021-10-11 PROCEDURE — 86900 BLOOD TYPING SEROLOGIC ABO: CPT | Performed by: EMERGENCY MEDICINE

## 2021-10-11 PROCEDURE — 85025 COMPLETE CBC W/AUTO DIFF WBC: CPT | Performed by: EMERGENCY MEDICINE

## 2021-10-11 RX ORDER — ACETAMINOPHEN 650 MG/1
650 SUPPOSITORY RECTAL ONCE
Status: DISCONTINUED | OUTPATIENT
Start: 2021-10-11 | End: 2021-10-15 | Stop reason: HOSPADM

## 2021-10-11 RX ORDER — SODIUM CHLORIDE 0.9 % (FLUSH) 0.9 %
10 SYRINGE (ML) INJECTION AS NEEDED
Status: DISCONTINUED | OUTPATIENT
Start: 2021-10-11 | End: 2021-10-15 | Stop reason: HOSPADM

## 2021-10-11 RX ORDER — SODIUM, POTASSIUM,MAG SULFATES 17.5-3.13G
1 SOLUTION, RECONSTITUTED, ORAL ORAL EVERY 12 HOURS
Status: COMPLETED | OUTPATIENT
Start: 2021-10-11 | End: 2021-10-12

## 2021-10-11 RX ORDER — SODIUM CHLORIDE 0.9 % (FLUSH) 0.9 %
10 SYRINGE (ML) INJECTION EVERY 12 HOURS SCHEDULED
Status: DISCONTINUED | OUTPATIENT
Start: 2021-10-11 | End: 2021-10-15 | Stop reason: HOSPADM

## 2021-10-11 RX ORDER — MAGNESIUM SULFATE HEPTAHYDRATE 40 MG/ML
2 INJECTION, SOLUTION INTRAVENOUS AS NEEDED
Status: DISCONTINUED | OUTPATIENT
Start: 2021-10-11 | End: 2021-10-15 | Stop reason: HOSPADM

## 2021-10-11 RX ORDER — ACETAMINOPHEN 650 MG/1
650 SUPPOSITORY RECTAL EVERY 4 HOURS PRN
Status: DISCONTINUED | OUTPATIENT
Start: 2021-10-11 | End: 2021-10-15 | Stop reason: HOSPADM

## 2021-10-11 RX ORDER — POTASSIUM CHLORIDE 1.5 G/1.77G
40 POWDER, FOR SOLUTION ORAL AS NEEDED
Status: DISCONTINUED | OUTPATIENT
Start: 2021-10-11 | End: 2021-10-15 | Stop reason: HOSPADM

## 2021-10-11 RX ORDER — ACETAMINOPHEN 325 MG/1
650 TABLET ORAL ONCE
Status: DISCONTINUED | OUTPATIENT
Start: 2021-10-11 | End: 2021-10-15 | Stop reason: HOSPADM

## 2021-10-11 RX ORDER — DIPHENHYDRAMINE HYDROCHLORIDE 50 MG/ML
25 INJECTION INTRAMUSCULAR; INTRAVENOUS ONCE
Status: DISCONTINUED | OUTPATIENT
Start: 2021-10-11 | End: 2021-10-15 | Stop reason: HOSPADM

## 2021-10-11 RX ORDER — ALUMINA, MAGNESIA, AND SIMETHICONE 2400; 2400; 240 MG/30ML; MG/30ML; MG/30ML
15 SUSPENSION ORAL EVERY 6 HOURS PRN
Status: DISCONTINUED | OUTPATIENT
Start: 2021-10-11 | End: 2021-10-15 | Stop reason: HOSPADM

## 2021-10-11 RX ORDER — NITROGLYCERIN 0.4 MG/1
0.4 TABLET SUBLINGUAL
Status: DISCONTINUED | OUTPATIENT
Start: 2021-10-11 | End: 2021-10-15 | Stop reason: HOSPADM

## 2021-10-11 RX ORDER — PANTOPRAZOLE SODIUM 40 MG/10ML
40 INJECTION, POWDER, LYOPHILIZED, FOR SOLUTION INTRAVENOUS EVERY 12 HOURS SCHEDULED
Status: DISCONTINUED | OUTPATIENT
Start: 2021-10-11 | End: 2021-10-15

## 2021-10-11 RX ORDER — POTASSIUM CHLORIDE 20 MEQ/1
40 TABLET, EXTENDED RELEASE ORAL AS NEEDED
Status: DISCONTINUED | OUTPATIENT
Start: 2021-10-11 | End: 2021-10-15 | Stop reason: HOSPADM

## 2021-10-11 RX ORDER — MAGNESIUM CARB/ALUMINUM HYDROX 105-160MG
296 TABLET,CHEWABLE ORAL ONCE
Status: DISCONTINUED | OUTPATIENT
Start: 2021-10-11 | End: 2021-10-11

## 2021-10-11 RX ORDER — ONDANSETRON 2 MG/ML
4 INJECTION INTRAMUSCULAR; INTRAVENOUS EVERY 6 HOURS PRN
Status: DISCONTINUED | OUTPATIENT
Start: 2021-10-11 | End: 2021-10-15 | Stop reason: HOSPADM

## 2021-10-11 RX ORDER — CHOLECALCIFEROL (VITAMIN D3) 125 MCG
5 CAPSULE ORAL NIGHTLY PRN
Status: DISCONTINUED | OUTPATIENT
Start: 2021-10-11 | End: 2021-10-15 | Stop reason: HOSPADM

## 2021-10-11 RX ORDER — ACETAMINOPHEN 325 MG/1
650 TABLET ORAL EVERY 4 HOURS PRN
Status: DISCONTINUED | OUTPATIENT
Start: 2021-10-11 | End: 2021-10-15 | Stop reason: HOSPADM

## 2021-10-11 RX ORDER — ONDANSETRON 4 MG/1
4 TABLET, FILM COATED ORAL EVERY 6 HOURS PRN
Status: DISCONTINUED | OUTPATIENT
Start: 2021-10-11 | End: 2021-10-15 | Stop reason: HOSPADM

## 2021-10-11 RX ORDER — DIPHENHYDRAMINE HCL 25 MG
25 CAPSULE ORAL ONCE
Status: DISCONTINUED | OUTPATIENT
Start: 2021-10-11 | End: 2021-10-15 | Stop reason: HOSPADM

## 2021-10-11 RX ORDER — ACETAMINOPHEN 160 MG/5ML
650 SOLUTION ORAL ONCE
Status: DISCONTINUED | OUTPATIENT
Start: 2021-10-11 | End: 2021-10-15 | Stop reason: HOSPADM

## 2021-10-11 RX ORDER — MAGNESIUM SULFATE 1 G/100ML
1 INJECTION INTRAVENOUS AS NEEDED
Status: DISCONTINUED | OUTPATIENT
Start: 2021-10-11 | End: 2021-10-15 | Stop reason: HOSPADM

## 2021-10-11 RX ORDER — ACETAMINOPHEN 160 MG/5ML
650 SOLUTION ORAL EVERY 4 HOURS PRN
Status: DISCONTINUED | OUTPATIENT
Start: 2021-10-11 | End: 2021-10-15 | Stop reason: HOSPADM

## 2021-10-11 RX ADMIN — PANTOPRAZOLE SODIUM 40 MG: 40 INJECTION, POWDER, FOR SOLUTION INTRAVENOUS at 16:57

## 2021-10-11 RX ADMIN — SODIUM CHLORIDE 1000 ML: 9 INJECTION, SOLUTION INTRAVENOUS at 11:22

## 2021-10-11 RX ADMIN — PANTOPRAZOLE SODIUM 40 MG: 40 INJECTION, POWDER, FOR SOLUTION INTRAVENOUS at 21:16

## 2021-10-11 RX ADMIN — Medication 10 ML: at 16:57

## 2021-10-11 RX ADMIN — Medication 10 ML: at 21:16

## 2021-10-11 RX ADMIN — Medication 1 BOTTLE: at 17:06

## 2021-10-11 NOTE — CONSULTS
GI CONSULT  NOTE:    Referring Provider:  Dr. Hutchinson    Chief complaint: Rectal bleeding    Subjective .     History of present illness: Patient is an 89-year-old female with history of CVA, colon cancer status post surgery, and hypertension who presented to the hospital this morning with rectal bleeding.  Patient son is at the bedside to assist with the history.  He reports that yesterday morning patient did have small amount of bright red blood per rectum and was doing well at dinner last evening.  Then, this morning patient was found in a large bowel movement of blood on the floor.  She denies having any associated abdominal pain.  Reports nausea with dry heaves but no vomiting.  Normally her bowels move daily and she denies constipation, however she does occasionally use a natural laxative supplement.  She does not take NSAIDs.  Is on 81 mg aspirin.  Has lost 25 to 30 pounds over the past year and a half.  Patient was recently here in hemoglobin on 2021 was 13.  Hemoglobin now 8.4.      Endo History:  2018 EGD/colonoscopy by Dr. Rivas -small hiatal hernia, ulcerative cecal mass positive for invasive moderately differentiated adenocarcinoma, several tubular adenoma colon polyps which were not removed.    Past Medical History:  Past Medical History:   Diagnosis Date   • Arthritis    • CAD (coronary artery disease)    • Cancer (CMS/HCC)    • CVA (cerebral vascular accident) (CMS/HCC)    • Difficulty walking    • Hypertension    • Memory loss    • Palpitation    • ST elevation    • TIA (transient ischemic attack)    • Weakness        Past Surgical History:  Past Surgical History:   Procedure Laterality Date   • CATARACT EXTRACTION     •  SECTION     • COLON SURGERY  2018   • MASS EXCISION  colon       Social History:  Social History     Tobacco Use   • Smoking status: Never Smoker   • Smokeless tobacco: Never Used   Vaping Use   • Vaping Use: Never used   Substance Use Topics   • Alcohol use: No  "  • Drug use: No       Family History:  Family History   Problem Relation Age of Onset   • Cancer Mother    • Cancer Father    • Cancer Sister        Medications:  (Not in a hospital admission)      Scheduled Meds:pantoprazole, 40 mg, Intravenous, Q12H  sodium chloride, 10 mL, Intravenous, Q12H      Continuous Infusions:   PRN Meds:.•  acetaminophen **OR** acetaminophen **OR** acetaminophen  •  aluminum-magnesium hydroxide-simethicone  •  magnesium sulfate **OR** magnesium sulfate in D5W 1g/100mL (PREMIX)  •  melatonin  •  nitroglycerin  •  ondansetron **OR** ondansetron  •  potassium chloride  •  potassium chloride  •  [COMPLETED] Insert peripheral IV **AND** sodium chloride  •  [COMPLETED] Insert peripheral IV **AND** sodium chloride  •  sodium chloride    ALLERGIES:  Epinephrine and Sulfamethoxazole-trimethoprim    ROS:  Review of Systems   Constitutional: Negative for chills and fever.   Respiratory: Negative for cough.    Cardiovascular: Positive for palpitations. Negative for chest pain.   Gastrointestinal: Positive for blood in stool and nausea. Negative for abdominal pain.   Musculoskeletal: Negative for arthralgias and back pain.   Neurological: Positive for weakness. Negative for dizziness.   Psychiatric/Behavioral: Positive for confusion. Negative for agitation.       Objective     Vital Signs:   Visit Vitals  /56   Pulse 95   Temp 97.5 °F (36.4 °C) (Oral)   Resp 18   Ht 167.6 cm (66\")   Wt 59 kg (130 lb)   SpO2 100%   BMI 20.98 kg/m²       Physical Exam:      General Appearance:    Awake and alert, in no acute distress, confused with some questions   Head:    Normocephalic, without obvious abnormality, atraumatic   Eyes:            Conjunctivae normal, anicteric sclera   Ears:    Ears appear intact with no abnormalities noted   Throat:   No oral lesions, no thrush, oral mucosa moist   Neck:   No adenopathy, supple, no thyromegaly, no JVD   Lungs:     Respirations regular, even and unlabored     "   Chest Wall:    No abnormalities observed   Abdomen:     Soft, non-tender, no rebound or guarding, non-distended, no hepatosplenomegaly   Rectal:     Deferred   Extremities:  Weakness of lower extremities, no edema, no cyanosis, no             redness   Pulses:   Pulses palpable and equal bilaterally   Skin:   No bleeding, bruising or rash, no jaundice   Lymph nodes:   No palpable adenopathy   Neurologic:   Sensation intact       Results Review:   I reviewed the patient's labs and imaging.  CBC  Results from last 7 days   Lab Units 10/11/21  1120   RBC 10*6/mm3 2.73*   WBC 10*3/mm3 14.10*   HEMOGLOBIN g/dL 8.4*   PLATELETS 10*3/mm3 227       CMP  Results from last 7 days   Lab Units 10/11/21  1120   SODIUM mmol/L 144   POTASSIUM mmol/L 4.7   CHLORIDE mmol/L 111*   CO2 mmol/L 22.0   BUN mg/dL 32*   CREATININE mg/dL 1.02*   GLUCOSE mg/dL 147*   ALBUMIN g/dL 3.30*   BILIRUBIN mg/dL 0.3   ALK PHOS U/L 44   AST (SGOT) U/L 13   ALT (SGPT) U/L 10       Amylase and Lipase        CRP         Imaging Results (Last 24 Hours)     ** No results found for the last 24 hours. 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  Also had syncopal episode.  Hemoglobin on 9/26/2021 was 13 and is now down to 8.4.  Consider diverticular bleeding versus mass versus AVM.  Aspirin has been stopped.  Plan clear liquid diet and bowel cleanse for colonoscopy tomorrow.  Monitor H&H and transfuse as needed.  Has been started on PPI and IV fluids.    I discussed the patients findings and my recommendations with the patient.  Trupti Orozco, APRN  10/11/21  15:25 EDT

## 2021-10-11 NOTE — ED PROVIDER NOTES
Subjective   Patient is an 89-year-old female complaining of rectal bleeding weakness and near syncope today. She states she been bleeding for the past several hours. She had near syncope when she was on the commode. She did not lose consciousness. She denies cough fever chest pain abdominal pain or other complaint          Review of Systems  Negative for headache ears throat cough fever chest pain shortness of breath abdominal pain vomit diarrhea dysuria is weight loss or other complaint. A complete review systems was obtained and is otherwise negative  Past Medical History:   Diagnosis Date   • Arthritis    • CAD (coronary artery disease)    • Cancer (CMS/HCC)    • CVA (cerebral vascular accident) (CMS/HCC)    • Difficulty walking    • Hypertension    • Memory loss    • Palpitation    • ST elevation    • TIA (transient ischemic attack)    • Weakness        Allergies   Allergen Reactions   • Epinephrine Unknown (See Comments)   • Sulfamethoxazole-Trimethoprim Rash       Past Surgical History:   Procedure Laterality Date   • CATARACT EXTRACTION     •  SECTION     • COLON SURGERY  2018   • MASS EXCISION  colon       Family History   Problem Relation Age of Onset   • Cancer Mother    • Cancer Father    • Cancer Sister        Social History     Socioeconomic History   • Marital status:    Tobacco Use   • Smoking status: Never Smoker   • Smokeless tobacco: Never Used   Vaping Use   • Vaping Use: Never used   Substance and Sexual Activity   • Alcohol use: No   • Drug use: No   • Sexual activity: Defer           Objective   Physical Exam  HEENT exam shows TMs to be clear. Oropharynx comers but sclerae nonicteric. Neck has no adenopathy JVD or bruits. Lungs are clear. Heart has regular rate rhythm without murmur or gallop. Chest is nontender. Abdomen soft nontender. Rectal exam showed patient had maroon stools.  Procedures           ED Course      Results for orders placed or performed during the hospital  encounter of 10/11/21   Comprehensive Metabolic Panel    Specimen: Blood   Result Value Ref Range    Glucose 147 (H) 65 - 99 mg/dL    BUN 32 (H) 8 - 23 mg/dL    Creatinine 1.02 (H) 0.57 - 1.00 mg/dL    Sodium 144 136 - 145 mmol/L    Potassium 4.7 3.5 - 5.2 mmol/L    Chloride 111 (H) 98 - 107 mmol/L    CO2 22.0 22.0 - 29.0 mmol/L    Calcium 8.8 8.6 - 10.5 mg/dL    Total Protein 5.5 (L) 6.0 - 8.5 g/dL    Albumin 3.30 (L) 3.50 - 5.20 g/dL    ALT (SGPT) 10 1 - 33 U/L    AST (SGOT) 13 1 - 32 U/L    Alkaline Phosphatase 44 39 - 117 U/L    Total Bilirubin 0.3 0.0 - 1.2 mg/dL    eGFR Non African Amer 51 (L) >60 mL/min/1.73    Globulin 2.2 gm/dL    A/G Ratio 1.5 g/dL    BUN/Creatinine Ratio 31.4 (H) 7.0 - 25.0    Anion Gap 11.0 5.0 - 15.0 mmol/L   CBC Auto Differential    Specimen: Blood   Result Value Ref Range    WBC 14.10 (H) 3.40 - 10.80 10*3/mm3    RBC 2.73 (L) 3.77 - 5.28 10*6/mm3    Hemoglobin 8.4 (L) 12.0 - 15.9 g/dL    Hematocrit 25.3 (L) 34.0 - 46.6 %    MCV 92.7 79.0 - 97.0 fL    MCH 30.8 26.6 - 33.0 pg    MCHC 33.3 31.5 - 35.7 g/dL    RDW 13.2 12.3 - 15.4 %    RDW-SD 43.8 37.0 - 54.0 fl    MPV 8.2 6.0 - 12.0 fL    Platelets 227 140 - 450 10*3/mm3    Neutrophil % 78.4 (H) 42.7 - 76.0 %    Lymphocyte % 15.9 (L) 19.6 - 45.3 %    Monocyte % 3.8 (L) 5.0 - 12.0 %    Eosinophil % 1.5 0.3 - 6.2 %    Basophil % 0.4 0.0 - 1.5 %    Neutrophils, Absolute 11.00 (H) 1.70 - 7.00 10*3/mm3    Lymphocytes, Absolute 2.20 0.70 - 3.10 10*3/mm3    Monocytes, Absolute 0.50 0.10 - 0.90 10*3/mm3    Eosinophils, Absolute 0.20 0.00 - 0.40 10*3/mm3    Basophils, Absolute 0.10 0.00 - 0.20 10*3/mm3    nRBC 0.1 0.0 - 0.2 /100 WBC   ECG 12 Lead   Result Value Ref Range    QT Interval 353 ms   Type & Screen    Specimen: Blood   Result Value Ref Range    ABO Type A     RH type Positive     Antibody Screen Negative     T&S Expiration Date 10/14/2021 11:59:59 PM    Gold Top - SST   Result Value Ref Range    Extra Tube Hold for add-ons.    Light  Blue Top   Result Value Ref Range    Extra Tube hold for add-on                                           MDM  Number of Diagnoses or Management Options  Diagnosis management comments: Patient was never hypotensive. Her hemoglobin is greater than 8 however when I reviewed her old chart it is noted her hemoglobin approximate 2 weeks ago was greater than 13. Patient was given IV fluid boluses. She had her blood typed and screened. Patient admitted for further GI evaluation. Did speak the on-call hospitalist.       Amount and/or Complexity of Data Reviewed  Clinical lab tests: reviewed  Tests in the medicine section of CPT®: reviewed    Risk of Complications, Morbidity, and/or Mortality  Presenting problems: high  Diagnostic procedures: high  Management options: high    Patient Progress  Patient progress: stable      Final diagnoses:   Lower GI bleed       ED Disposition  ED Disposition     ED Disposition Condition Comment    Decision to Admit            No follow-up provider specified.       Medication List      No changes were made to your prescriptions during this visit.          Ricardo Hutchinson MD  10/11/21 1257

## 2021-10-11 NOTE — H&P
"    [facility] Medicine Services      Patient Name: Flori Tubbs  : 1932  MRN: [id number]  Primary Care Physician:  Umberto White MD  Date of admission: 10/11/2021      Subjective      Chief Complaint:  \"I fainted\"    History of Present Illness: Flori Tubbs is a 89 y.o. female with a history of dementia, CAD s/p stent placement, HTN, HLD, CVA, colon cancer, and COVID-19 (20) who presented to [facility] ED on 10/11/2021 complaining of fainting at home. The patient is a poor historian and forgetful. Her son is at the bedside and helped provide the history. He states the patient lives in Independent Living at [facility]. The patient states she fainted trying to go to the bathroom this morning. She reports some chest discomfort. She denies shortness of breath at rest, but reports dyspnea on exertion. She also reports dizziness. She states she noticed there was blood in the commode. She denies any abdominal pain, fever or chills. She takes a baby aspirin daily. She denies regular NSAID use.     Workup in the ER revealed gastrointestinal hemorrhage and acute blood loss anemia. The patient's blood pressure was borderline low so she was given IV fluids. Gastroenterology was consulted. She was admitted to the Hospitalist group for further evaluation and treatment.       Review of Systems   Unable to perform ROS: dementia (limited d/t dementia)   Cardiovascular: Positive for chest pain, dyspnea on exertion and syncope.   Respiratory: Negative for shortness of breath.    Gastrointestinal: Negative for abdominal pain, nausea and vomiting.   Neurological: Positive for dizziness.        Personal History     Past Medical History:   Diagnosis Date   • Arthritis    • CAD (coronary artery disease)    • Colon cancer (HCC)    • COVID-19 2020   • CVA (cerebral vascular accident) (HCC)    • Dementia (HCC)    • Difficulty walking    • Glaucoma 2012   • Hypertension    • " Myocardial infarction (HCC)    • Palpitation    • TIA (transient ischemic attack)    • Weakness        Past Surgical History:   Procedure Laterality Date   • CATARACT EXTRACTION     •  SECTION     • COLON SURGERY  2018   • MASS EXCISION  colon       Family History: family history includes Cancer in her father, mother, and sister. Otherwise pertinent FHx was reviewed and not pertinent to current issue.    Social History:  reports that she has never smoked. She has never used smokeless tobacco. She reports that she does not drink alcohol and does not use drugs.    Home Medications:  Prior to Admission Medications     Prescriptions Last Dose Informant Patient Reported? Taking?    amLODIPine (NORVASC) 10 MG tablet   No No    Take 1 tablet by mouth Daily.    atorvastatin (LIPITOR) 10 MG tablet   No No    TAKE 1 TABLET EVERY DAY    bimatoprost (LUMIGAN) 0.01 % ophthalmic drops   Yes No    LUMIGAN 0.01 % SOLN    COMBIGAN 0.2-0.5 % ophthalmic solution   Yes No    Administer 1 drop to both eyes 2 (Two) Times a Day.    Cyanocobalamin (Vitamin B-12) 1000 MCG sublingual tablet   No No    Place 1,000 mcg under the tongue Daily.    donepezil (Aricept) 10 MG tablet   No No    Take 1 tablet by mouth Every Night.    GNP Aspirin 81 MG EC tablet   No No    TAKE 1 TABLET EVERY DAY    MULTIPLE VITAMIN PO   Yes No    Take 1 tablet by mouth Daily.    nitrofurantoin, macrocrystal-monohydrate, (MACROBID) 100 MG capsule   No No    Take 1 capsule by mouth 2 (Two) Times a Day.    nitroglycerin (Nitrostat) 0.4 MG SL tablet   No No    Place 1 tablet under the tongue Every 5 (Five) Minutes As Needed for Chest Pain. Take no more than 3 doses in 15 minutes.    RHOPRESSA 0.02 % solution   Yes No    Vitamin D, Ergocalciferol, 50 MCG (2000 UT) capsule   No No    Take 2,000 Units by mouth Daily.            Allergies:  Allergies   Allergen Reactions   • Epinephrine Unknown (See Comments)   • Sulfamethoxazole-Trimethoprim Rash       Objective       Vitals:   Temp:  [97.5 °F (36.4 °C)] 97.5 °F (36.4 °C)  Heart Rate:  [89-97] 95  Resp:  [18] 18  BP: (111-134)/(54-61) 111/56    Physical Exam  Vitals reviewed.   HENT:      Head: Normocephalic.      Mouth/Throat:      Mouth: Mucous membranes are moist.   Eyes:      Extraocular Movements: Extraocular movements intact.      Conjunctiva/sclera: Conjunctivae normal.   Cardiovascular:      Rate and Rhythm: Normal rate and regular rhythm.      Pulses: Normal pulses.   Pulmonary:      Effort: Pulmonary effort is normal.      Breath sounds: Normal breath sounds.   Abdominal:      General: Bowel sounds are normal. There is no distension.      Palpations: Abdomen is soft.      Tenderness: There is no abdominal tenderness.   Musculoskeletal:         General: Normal range of motion.      Cervical back: Normal range of motion.      Right lower leg: No edema.      Left lower leg: No edema.   Skin:     General: Skin is dry.      Capillary Refill: Capillary refill takes less than 2 seconds.      Coloration: Skin is pale.   Neurological:      Mental Status: She is alert.      Comments: Oriented to person and place, forgetful   Psychiatric:         Mood and Affect: Mood normal.         Behavior: Behavior normal.          Result Review    Result Review:  I have personally reviewed the results from the time of this admission to 10/11/2021 15:53 EDT and agree with these findings:  [x]  Laboratory  []  Microbiology  []  Radiology  [x]  EKG/Telemetry   []  Cardiology/Vascular   []  Pathology  [x]  Old records  []  Other:    Most notable findings include:   WBC 14.10, hgb 8.4, BUN 32, Cr 1.02, glucose 147, protein 5.5, albumin 3.30      Assessment/Plan        Active Hospital Problems:  Active Hospital Problems    Diagnosis    • **Gastrointestinal hemorrhage    • Syncope    • Acute blood loss anemia    • Memory impairment    • History of CVA (cerebrovascular accident)    • Primary hypertension    • History of colon cancer    • Chronic  coronary artery disease    • Dyslipidemia    • Glaucoma      Plan:     Gastrointestinal hemorrhage  Acute blood loss anemia  -hgb 8.4 on admission, compared to 13.0 on 09/26/21  -monitor serial H&H, transfuse for hgb <7.0 or hemodynamic instability  -PPI BID  -GI consulted  -hold aspirin    Syncope  -suspect orthostatic hypotension secondary to GI bleed  -given 2 L IVF in ER  -check serial troponin  -continuous cardiac monitoring  -fall precautions  -check orthostatic vitals    Chronic coronary artery disease / Dyslipidemia / Primary hypertension, chronic  -hold aspirin d/t GIB  -hold antihypertensives d/t borderline hypotension  -continue statin  -monitor BP    History of colon cancer, s/p resection    Glaucoma  -continue home eye drops once med list verified     History of CVA (cerebrovascular accident)  -hold aspirin, continue statin as above    Memory impairment  -continue donepezil once home med list verified           DVT prophylaxis:  Mechanical DVT prophylaxis orders are present.    CODE STATUS:    Code Status: CPR  Medical Interventions (Level of Support Prior to Arrest): Full    Admission Status:  I believe this patient meets inpatient status.    I discussed the patient's findings and my recommendations with patient and family.    This patient has been examined wearing appropriate Personal Protective Equipment. 10/11/21      Signature: Electronically signed by QUAN Alegre, 10/11/21, 5:58 PM EDT.    Attending attestation: The patient is a very pleasant 89-year-old female presented for evaluation after she had a bloody bowel movement followed by a syncopal episode at home.  Patient was noted to have significant anemia and transfusions were ordered and GI consulted.  Physical exam: Vital signs and nurses notes reviewed.  Pale appearing elderly female awake and alert in no acute distress; lungs clear to auscultation bilaterally; abdomen soft and nontender; extremities with no edema.  Assessment and  plan: Acute blood loss anemia secondary to GI bleed  -Transfuse as needed to maintain hemoglobin greater than 7  -GI consulting and planning for colonoscopy  Patient was seen and examined and chart reviewed on 10/11/2021.  I agree with the assessment and plan of the APRN.  Keisha Montes MD  Electronically signed by Keisha Montes MD, 10/18/21, 10:50 AM EDT.

## 2021-10-11 NOTE — TELEPHONE ENCOUNTER
Caller: [address]    Relationship to patient: Child    Best call back number: [phone]    Chief complaint: BLOOD IN STOOL    Type of visit: OFFICE VISIT     Requested date: ASAP    If rescheduling, when is the original appointment: 11/01/2021    Additional notes: THE PATIENT IS HAVING RECTAL BLEEDING WITH NO PAIN OR OTHER SYMPTOMS. MAY CANCEL IF NO SYMPTOMS.

## 2021-10-11 NOTE — ED NOTES
Pt son reports that the daughter found the Pt on the floor this morning covered in blood. Pt reports she did not lose consciousness. Pt state she does not take blood thinners. Pt also states she does not know what time she fell or how long she was on the floor.     Mark Avila, RN  10/11/21 [id number]

## 2021-10-12 ENCOUNTER — INPATIENT HOSPITAL (AMBULATORY)
Dept: URBAN - METROPOLITAN AREA HOSPITAL 84 | Facility: HOSPITAL | Age: 86
End: 2021-10-12
Payer: MEDICARE

## 2021-10-12 ENCOUNTER — ANESTHESIA EVENT (OUTPATIENT)
Dept: GASTROENTEROLOGY | Facility: HOSPITAL | Age: 86
End: 2021-10-12

## 2021-10-12 ENCOUNTER — ANESTHESIA (OUTPATIENT)
Dept: GASTROENTEROLOGY | Facility: HOSPITAL | Age: 86
End: 2021-10-12

## 2021-10-12 DIAGNOSIS — Z85.038 PERSONAL HISTORY OF OTHER MALIGNANT NEOPLASM OF LARGE INTEST: ICD-10-CM

## 2021-10-12 DIAGNOSIS — K63.5 POLYP OF COLON: ICD-10-CM

## 2021-10-12 DIAGNOSIS — K62.5 HEMORRHAGE OF ANUS AND RECTUM: ICD-10-CM

## 2021-10-12 DIAGNOSIS — K64.1 SECOND DEGREE HEMORRHOIDS: ICD-10-CM

## 2021-10-12 DIAGNOSIS — K57.30 DIVERTICULOSIS OF LARGE INTESTINE WITHOUT PERFORATION OR ABS: ICD-10-CM

## 2021-10-12 DIAGNOSIS — D50.0 IRON DEFICIENCY ANEMIA SECONDARY TO BLOOD LOSS (CHRONIC): ICD-10-CM

## 2021-10-12 LAB
ANION GAP SERPL CALCULATED.3IONS-SCNC: 9 MMOL/L (ref 5–15)
BASOPHILS # BLD AUTO: 0.1 10*3/MM3 (ref 0–0.2)
BASOPHILS NFR BLD AUTO: 0.7 % (ref 0–1.5)
BUN SERPL-MCNC: 24 MG/DL (ref 8–23)
BUN/CREAT SERPL: 26.4 (ref 7–25)
CALCIUM SPEC-SCNC: 9 MG/DL (ref 8.6–10.5)
CHLORIDE SERPL-SCNC: 115 MMOL/L (ref 98–107)
CO2 SERPL-SCNC: 22 MMOL/L (ref 22–29)
CREAT SERPL-MCNC: 0.91 MG/DL (ref 0.57–1)
DEPRECATED RDW RBC AUTO: 47.3 FL (ref 37–54)
EOSINOPHIL # BLD AUTO: 0.3 10*3/MM3 (ref 0–0.4)
EOSINOPHIL NFR BLD AUTO: 3.1 % (ref 0.3–6.2)
ERYTHROCYTE [DISTWIDTH] IN BLOOD BY AUTOMATED COUNT: 14.9 % (ref 12.3–15.4)
GFR SERPL CREATININE-BSD FRML MDRD: 58 ML/MIN/1.73
GLUCOSE SERPL-MCNC: 96 MG/DL (ref 65–99)
HCT VFR BLD AUTO: 18.4 % (ref 34–46.6)
HCT VFR BLD AUTO: 26.3 % (ref 34–46.6)
HGB BLD-MCNC: 6.2 G/DL (ref 12–15.9)
HGB BLD-MCNC: 8.9 G/DL (ref 12–15.9)
LYMPHOCYTES # BLD AUTO: 2.8 10*3/MM3 (ref 0.7–3.1)
LYMPHOCYTES NFR BLD AUTO: 29.7 % (ref 19.6–45.3)
MAGNESIUM SERPL-MCNC: 2 MG/DL (ref 1.6–2.4)
MCH RBC QN AUTO: 30.7 PG (ref 26.6–33)
MCHC RBC AUTO-ENTMCNC: 33.7 G/DL (ref 31.5–35.7)
MCV RBC AUTO: 91 FL (ref 79–97)
MONOCYTES # BLD AUTO: 0.7 10*3/MM3 (ref 0.1–0.9)
MONOCYTES NFR BLD AUTO: 7.8 % (ref 5–12)
NEUTROPHILS NFR BLD AUTO: 5.5 10*3/MM3 (ref 1.7–7)
NEUTROPHILS NFR BLD AUTO: 58.7 % (ref 42.7–76)
NRBC BLD AUTO-RTO: 0.1 /100 WBC (ref 0–0.2)
PLATELET # BLD AUTO: 169 10*3/MM3 (ref 140–450)
PMV BLD AUTO: 7.9 FL (ref 6–12)
POTASSIUM SERPL-SCNC: 4 MMOL/L (ref 3.5–5.2)
RBC # BLD AUTO: 2.9 10*6/MM3 (ref 3.77–5.28)
SODIUM SERPL-SCNC: 146 MMOL/L (ref 136–145)
TROPONIN T SERPL-MCNC: <0.01 NG/ML (ref 0–0.03)
WBC # BLD AUTO: 9.3 10*3/MM3 (ref 3.4–10.8)

## 2021-10-12 PROCEDURE — 45378 DIAGNOSTIC COLONOSCOPY: CPT | Performed by: INTERNAL MEDICINE

## 2021-10-12 PROCEDURE — 0DJD8ZZ INSPECTION OF LOWER INTESTINAL TRACT, VIA NATURAL OR ARTIFICIAL OPENING ENDOSCOPIC: ICD-10-PCS | Performed by: INTERNAL MEDICINE

## 2021-10-12 PROCEDURE — 85018 HEMOGLOBIN: CPT | Performed by: INTERNAL MEDICINE

## 2021-10-12 PROCEDURE — 85025 COMPLETE CBC W/AUTO DIFF WBC: CPT | Performed by: NURSE PRACTITIONER

## 2021-10-12 PROCEDURE — 25010000002 PROPOFOL 10 MG/ML EMULSION: Performed by: ANESTHESIOLOGY

## 2021-10-12 PROCEDURE — 84484 ASSAY OF TROPONIN QUANT: CPT | Performed by: NURSE PRACTITIONER

## 2021-10-12 PROCEDURE — 85014 HEMATOCRIT: CPT | Performed by: INTERNAL MEDICINE

## 2021-10-12 PROCEDURE — P9016 RBC LEUKOCYTES REDUCED: HCPCS

## 2021-10-12 PROCEDURE — 80048 BASIC METABOLIC PNL TOTAL CA: CPT | Performed by: NURSE PRACTITIONER

## 2021-10-12 PROCEDURE — 25010000002 ONDANSETRON PER 1 MG: Performed by: NURSE PRACTITIONER

## 2021-10-12 PROCEDURE — 99232 SBSQ HOSP IP/OBS MODERATE 35: CPT | Performed by: HOSPITALIST

## 2021-10-12 PROCEDURE — 86900 BLOOD TYPING SEROLOGIC ABO: CPT

## 2021-10-12 PROCEDURE — 36430 TRANSFUSION BLD/BLD COMPNT: CPT

## 2021-10-12 PROCEDURE — 83735 ASSAY OF MAGNESIUM: CPT | Performed by: NURSE PRACTITIONER

## 2021-10-12 RX ORDER — PROPOFOL 10 MG/ML
VIAL (ML) INTRAVENOUS AS NEEDED
Status: DISCONTINUED | OUTPATIENT
Start: 2021-10-12 | End: 2021-10-12 | Stop reason: SURG

## 2021-10-12 RX ORDER — SODIUM CHLORIDE 9 MG/ML
INJECTION, SOLUTION INTRAVENOUS CONTINUOUS PRN
Status: DISCONTINUED | OUTPATIENT
Start: 2021-10-12 | End: 2021-10-12 | Stop reason: SURG

## 2021-10-12 RX ORDER — PHENYLEPHRINE HCL IN 0.9% NACL 1 MG/10 ML
SYRINGE (ML) INTRAVENOUS AS NEEDED
Status: DISCONTINUED | OUTPATIENT
Start: 2021-10-12 | End: 2021-10-12 | Stop reason: SURG

## 2021-10-12 RX ORDER — SODIUM TETRADECYL SULFATE 30 MG/ML
INJECTION, SOLUTION INTRAVENOUS
Status: DISCONTINUED
Start: 2021-10-12 | End: 2021-10-12 | Stop reason: WASHOUT

## 2021-10-12 RX ORDER — ATORVASTATIN CALCIUM 10 MG/1
10 TABLET, FILM COATED ORAL DAILY
Status: DISCONTINUED | OUTPATIENT
Start: 2021-10-12 | End: 2021-10-15 | Stop reason: HOSPADM

## 2021-10-12 RX ORDER — DONEPEZIL HYDROCHLORIDE 5 MG/1
10 TABLET, FILM COATED ORAL NIGHTLY
Status: DISCONTINUED | OUTPATIENT
Start: 2021-10-12 | End: 2021-10-15 | Stop reason: HOSPADM

## 2021-10-12 RX ORDER — LIDOCAINE HYDROCHLORIDE 10 MG/ML
INJECTION, SOLUTION EPIDURAL; INFILTRATION; INTRACAUDAL; PERINEURAL AS NEEDED
Status: DISCONTINUED | OUTPATIENT
Start: 2021-10-12 | End: 2021-10-12 | Stop reason: SURG

## 2021-10-12 RX ORDER — ALCOHOL 0.98 ML/ML
INJECTION INTRASPINAL
Status: DISCONTINUED
Start: 2021-10-12 | End: 2021-10-12 | Stop reason: WASHOUT

## 2021-10-12 RX ADMIN — Medication 1 BOTTLE: at 04:00

## 2021-10-12 RX ADMIN — ONDANSETRON 4 MG: 2 INJECTION INTRAMUSCULAR; INTRAVENOUS at 05:00

## 2021-10-12 RX ADMIN — Medication 10 ML: at 08:28

## 2021-10-12 RX ADMIN — DONEPEZIL HYDROCHLORIDE 10 MG: 5 TABLET, FILM COATED ORAL at 20:47

## 2021-10-12 RX ADMIN — PANTOPRAZOLE SODIUM 40 MG: 40 INJECTION, POWDER, FOR SOLUTION INTRAVENOUS at 20:47

## 2021-10-12 RX ADMIN — Medication 400 MCG: at 14:28

## 2021-10-12 RX ADMIN — Medication 200 MCG: at 14:02

## 2021-10-12 RX ADMIN — ATORVASTATIN CALCIUM 10 MG: 10 TABLET, FILM COATED ORAL at 20:47

## 2021-10-12 RX ADMIN — SODIUM CHLORIDE: 0.9 INJECTION, SOLUTION INTRAVENOUS at 13:48

## 2021-10-12 RX ADMIN — PROPOFOL 180 MG: 10 INJECTION, EMULSION INTRAVENOUS at 13:55

## 2021-10-12 RX ADMIN — PANTOPRAZOLE SODIUM 40 MG: 40 INJECTION, POWDER, FOR SOLUTION INTRAVENOUS at 08:28

## 2021-10-12 RX ADMIN — Medication 10 ML: at 20:47

## 2021-10-12 RX ADMIN — Medication 200 MCG: at 14:23

## 2021-10-12 RX ADMIN — Medication 200 MCG: at 14:09

## 2021-10-12 RX ADMIN — LIDOCAINE HYDROCHLORIDE 50 MG: 10 INJECTION, SOLUTION EPIDURAL; INFILTRATION; INTRACAUDAL; PERINEURAL at 13:55

## 2021-10-12 NOTE — OP NOTE
COLONOSCOPY  Procedure Report    Patient Name:  Flori Tubbs  YOB: 1932    Date of Surgery:  10/12/2021     Indications: Lower GI bleed    Pre-op Diagnosis:   Gastrointestinal hemorrhage, unspecified gastrointestinal hemorrhage type [K92.2]         Post-op Diagnosis:  Colonoscopy to the ileocolic anastomosis with multiple small polyps identified, not resected today.  Moderately severe left-sided diverticular disease with bright red blood but no identifiable active bleeding at the time of colonoscopy  Grade 2 internal hemorrhoids      Procedure(s):  COLONOSCOPY    Staff:  Surgeon(s):  Tommy Kirkland MD         Anesthesia: Monitored Anesthesia Care    Estimated Blood Loss: None  Implants:    Nothing was implanted during the procedure    Specimen:          None    Complications:  No immediate    Description of Procedure:  Informed consent was obtained from the patient.  They were placed in the left lateral decubitus position and IV conscious sedation was administered by anesthesia while being monitored continuously throughout the procedure.  Digital rectal exam showed no external hemorrhoid, fissure or fistula.  Digital exam of the anal canal rectal vault was unremarkable.  The pediatric video Olympus colonoscope was introduced into the rectum and advanced to the ileocolic anastomosis which was photographed.  The terminal ileum did not have blood within it.  The prep was excellent.  There was a lot of bright red blood in the left colon and distal transverse colon with very little blood in the proximal transverse colon and none in the ascending colon to the ileocolic anastomosis.  On slow withdrawal with extensive lavage of every diverticular opening I could find, close circumferential colonic mucosal inspection was performed.  There are number of polyps ranging in size from 3 to 6 mm scattered through the colon that were not resected today as we were focused on identifying the source of  bleeding.  No ischemic, vascular, or inflammatory lesions were seen.  Despite extensive lavage and repeated passage of the scope from the rectum into the transverse colon I could not identify a bleeding diverticulum.  My suspicion is that with insufflation and barrel pressure along with decreased systolic pressure with propofol, the bleeding spontaneously stopped.  The scope was removed she tolerated the procedure well    Impression:  Highly suspicious for diverticular bleed, not currently bleeding    Recommendations:  Monitor H&H and transfuse to keep hemoglobin above 7  Clear liquid diet and if rebleeds urgent repeat colonoscopy would be carried out immediately  If the second colonoscopy fails to identify the source of bleeding and she bleeds again I would consider angiography and upper GI endoscopy just to rule out an upper source  Avoid all anticoagulants  We appreciate the referral and will follow.      Tommy Kirkland MD     Date: 10/12/2021  Time: 14:31 EDT

## 2021-10-12 NOTE — ANESTHESIA PREPROCEDURE EVALUATION
Anesthesia Evaluation     Patient summary reviewed and Nursing notes reviewed   no history of anesthetic complications:  NPO Solid Status: > 8 hours  NPO Liquid Status: > 8 hours           Airway   Dental      Pulmonary    (+) asthma,  Cardiovascular     ECG reviewed    (+) hypertension, past MI , CAD, dysrhythmias Atrial Fib, GARNICA, hyperlipidemia,       Neuro/Psych  (+) TIA, CVA, syncope, weakness, psychiatric history Depression and Anxiety, dementia,     GI/Hepatic/Renal/Endo    (+)  GI bleeding , thyroid problem thyroid nodules    Musculoskeletal     (+) chronic pain, myalgias,   Abdominal    Substance History      OB/GYN          Other   arthritis, blood dyscrasia anemia,   history of cancer    ROS/Med Hx Other: Glaucoma, difficulty walking, fatigue, dysphagia, chest pain, skin infection, mediastinal mass, vocal cord paralysis, sacral pressure injury, colon cancer, palpitations    H/O COVID-19    PSH  COLON SURGERY  SECTION  CATARACT EXTRACTION MASS EXCISION                Anesthesia Plan    ASA 3     MAC   (Patient identified; pre-operative vital signs, all relevant labs/studies, complete medical/surgical/anesthetic history, full medication list, full allergy list, and NPO status obtained/reviewed; physical assessment performed; anesthetic options, side effects, potential complications, risks, and benefits discussed; questions answered; written anesthesia consent obtained; patient cleared for procedure; anesthesia machine and equipment checked and functioning)    Anesthetic plan, all risks, benefits, and alternatives have been provided, discussed and informed consent has been obtained with: patient.

## 2021-10-12 NOTE — CASE MANAGEMENT/SOCIAL WORK
Discharge Planning Assessment   [facility]     Patient Name: Flori Tubbs  MRN: [id number]  Today's Date: 10/12/2021    Admit Date: 10/11/2021     Discharge Needs Assessment     Row Name 10/12/21 1400       Living Environment    Lives With facility resident    Current Living Arrangements independent/assisted living facility    Primary Care Provided by self    Provides Primary Care For no one, unable/limited ability to care for self    Family Caregiver if Needed child(galilea), adult    Able to Return to Prior Arrangements yes       Resource/Environmental Concerns    Resource/Environmental Concerns none    Transportation Concerns car, none       Transition Planning    Patient/Family Anticipates Transition to inpatient rehabilitation facility    Patient/Family Anticipated Services at Transition     Transportation Anticipated family or friend will provide       Discharge Needs Assessment    Readmission Within the Last 30 Days no previous admission in last 30 days    Current Outpatient/Agency/Support Group assisted living facility    Equipment Currently Used at Home walker, rolling    Concerns to be Addressed discharge planning    Anticipated Changes Related to Illness none    Equipment Needed After Discharge none    Outpatient/Agency/Support Group Needs skilled nursing facility    Discharge Facility/Level of Care Needs nursing facility, skilled               Discharge Plan     Row Name 10/12/21 1401       Plan    Plan From [facility]- will need PT prior to returning    Patient/Family in Agreement with Plan yes    Plan Comments patient in endo- called patients daughter betty. patient lives at [facility]. Patients daughter and son provides transportation. patient pcp and pharm confirmed. denies issues affording food or meds. patient has been getting weaker lately and patient daughter states she is not Independent with ADLs at this time. will need PT eval.              Expected Discharge Date and Time      Expected Discharge Date Expected Discharge Time    Oct 14, 2021          Demographic Summary     Row Name 10/12/21 1400       General Information    Admission Type inpatient    Arrived From emergency department    Required Notices Provided Important Message from Medicare    Referral Source admission list    Reason for Consult discharge planning    Preferred Language English     Used During This Interaction no               Functional Status     Row Name 10/12/21 1400       Functional Status    Usual Activity Tolerance fair    Current Activity Tolerance fair       Functional Status, IADL    Medications independent    Meal Preparation independent    Housekeeping independent    Laundry independent    Shopping assistive person                 Patient Forms     Row Name 10/12/21 1404       Patient Forms    Important Message from Medicare (IMM) Delivered  [facility] 10/11    Delivered to Patient              Phone communication or documentation only - no physical contact with patient or family.      Dee Hua, RN

## 2021-10-12 NOTE — PROGRESS NOTES
[facility] Medicine Services Daily Progress Note    Patient Name: Flori Tubbs  : 1932  MRN: [id number]  Primary Care Physician:  Umberto White MD  Date of admission: 10/11/2021      Subjective      Chief Complaint: Fainting spell      Patient Reports   10/12/2021: The patient is currently sleeping after her colonoscopy.  I spoke with her daughter at the bedside and all questions were answered.    Review of Systems   Unable to perform ROS: other (Postoperative somnolence from anesthesia)          Objective      Vitals:   Temp:  [97.6 °F (36.4 °C)-98 °F (36.7 °C)] 98 °F (36.7 °C)  Heart Rate:  [] 85  Resp:  [14-24] 14  BP: (116-132)/(48-77) 116/48  Flow (L/min):  [2] 2    Physical Exam  Vitals and nursing note reviewed.   Constitutional:       General: She is not in acute distress.     Appearance: She is not toxic-appearing or diaphoretic.      Comments: Pale appearing   HENT:      Nose: No congestion.      Mouth/Throat:      Mouth: Mucous membranes are moist.   Eyes:      General:         Right eye: No discharge.         Left eye: No discharge.   Cardiovascular:      Rate and Rhythm: Normal rate and regular rhythm.      Pulses: Normal pulses.      Heart sounds: Normal heart sounds.   Pulmonary:      Effort: Pulmonary effort is normal.      Breath sounds: Normal breath sounds.   Abdominal:      General: There is no distension.      Palpations: Abdomen is soft.   Musculoskeletal:         General: No swelling.      Right lower leg: No edema.      Left lower leg: No edema.   Skin:     General: Skin is warm and dry.             Result Review    Result Review:  I have personally reviewed the results from the time of this admission to 10/12/2021 17:36 EDT and agree with these findings:  [x]  Laboratory  []  Microbiology  [x]  Radiology  []  EKG/Telemetry   []  Cardiology/Vascular   []  Pathology  [x]  Old records  []  Other:  Most notable findings include: Hemoglobin on admission  was 8.4 compared to 13.09 2621 and then it dropped to 6.6 and subsequently improved to 8.3 and then 8.9 after 1 unit PRBCs.        Assessment/Plan      Brief Patient Summary:  Flori Tubbs is a 89 y.o. female with a history of dementia, CAD s/p stent placement, HTN, HLD, CVA, colon cancer, and COVID-19 (11/05/20) who presented to [facility] ED on 10/11/2021 complaining of fainting at home. The patient is a poor historian and forgetful. Her son is at the bedside and helped provide the history. He states the patient lives in Independent Living at [facility]. The patient states she fainted trying to go to the bathroom this morning. She reports some chest discomfort. She denies shortness of breath at rest, but reports dyspnea on exertion. She also reports dizziness. She states she noticed there was blood in the commode. She denies any abdominal pain, fever or chills. She takes a baby aspirin daily. She denies regular NSAID use.      Workup in the ER revealed gastrointestinal hemorrhage and acute blood loss anemia. The patient's blood pressure was borderline low so she was given IV fluids. Gastroenterology was consulted. She was admitted to the Hospitalist group for further evaluation and treatment.       Current inpatient medications:  acetaminophen, 650 mg, Oral, Once   Or  acetaminophen, 650 mg, Oral, Once   Or  acetaminophen, 650 mg, Rectal, Once  diphenhydrAMINE, 25 mg, Oral, Once   Or  diphenhydrAMINE, 25 mg, Intravenous, Once  pantoprazole, 40 mg, Intravenous, Q12H  sodium chloride, 10 mL, Intravenous, Q12H             Active Hospital Problems:  Active Hospital Problems    Diagnosis    • **Gastrointestinal hemorrhage    • Syncope    • Acute blood loss anemia    • Memory impairment    • History of CVA (cerebrovascular accident)    • Primary hypertension    • History of colon cancer    • Chronic coronary artery disease    • Dyslipidemia    • Glaucoma      Plan:     Gastrointestinal hemorrhage  Acute blood  loss anemia  -hgb 8.4 on admission, compared to 13.0 on 09/26/21  -Hemoglobin dropped to 6.6 and the patient was given 1 unit PRBC  -monitor serial H&H, transfuse for hgb <7.0 or hemodynamic instability  -PPI BID  -GI consulted and performed colonoscopy which showed diverticulosis, small polyps and hemorrhoids with some fresh blood in the colon but no signs of active bleeding  -hold aspirin     Syncope  -suspect orthostatic hypotension secondary to GI bleed  -given 2 L IVF in ER  -serial troponin x3 negative  -continuous cardiac monitoring  -fall precautions     Chronic coronary artery disease / Dyslipidemia / Primary hypertension, chronic with relative hypotension  -hold aspirin d/t GIB  -hold antihypertensives d/t borderline hypotension  -continue statin  -monitor BP     History of colon cancer, s/p resection     Glaucoma  -continue home eye drops once med list verified      History of CVA (cerebrovascular accident)  -hold aspirin, continue statin as above     Memory impairment  -continue donepezil        DVT prophylaxis:  Mechanical DVT prophylaxis orders are present.    CODE STATUS:    Code Status: CPR  Medical Interventions (Level of Support Prior to Arrest): Full      Disposition:  I expect patient to be discharged if hemoglobin remained stable once inpatient rehab has been arranged..    This patient has been examined wearing appropriate PPE 10/12/21.      Electronically signed by Keisha Montes MD, 10/12/21, 17:36 EDT.  [facility] Hospitalist Team

## 2021-10-12 NOTE — ANESTHESIA POSTPROCEDURE EVALUATION
Patient: Flori Tubbs    Procedure Summary     Date: 10/12/21 Room / Location: [facility] ENDOSCOPY 1 / [facility] ENDOSCOPY    Anesthesia Start: 1348 Anesthesia Stop: 1437    Procedure: COLONOSCOPY (N/A ) Diagnosis:       Gastrointestinal hemorrhage, unspecified gastrointestinal hemorrhage type      (Gastrointestinal hemorrhage, unspecified gastrointestinal hemorrhage type [K92.2])    Surgeons: Tommy Kirkland MD Provider: Nathan Ayala MD    Anesthesia Type: MAC ASA Status: 3          Anesthesia Type: MAC    Vitals  Vitals Value Taken Time   /53 10/12/21 1437   Temp     Pulse 74 10/12/21 1439   Resp     SpO2 100 % 10/12/21 1439   Vitals shown include unvalidated device data.        Post Anesthesia Care and Evaluation    Patient location during evaluation: PACU  Patient participation: complete - patient participated  Level of consciousness: awake  Pain scale: See nurse's notes for pain score.  Pain management: adequate  Airway patency: patent  Anesthetic complications: No anesthetic complications  PONV Status: none  Cardiovascular status: acceptable  Respiratory status: acceptable  Hydration status: acceptable    Comments: Patient seen and examined postoperatively; vital signs stable; SpO2 greater than or equal to 90%; cardiopulmonary status stable; nausea/vomiting adequately controlled; pain adequately controlled; no apparent anesthesia complications; patient discharged from anesthesia care when discharge criteria were met

## 2021-10-12 NOTE — PLAN OF CARE
Goal Outcome Evaluation:  Plan of Care Reviewed With: patient        Progress: no change  Outcome Summary: Patient has rested in bed since admission. Patient has experienced no acute events. Patient NPO for colonoscopy this morning. Will continue to monitor.

## 2021-10-12 NOTE — SIGNIFICANT NOTE
Family requesting to hold PT until after scope.  PT will follow up at later date.    Cata Salguero PT, DPT, GCS

## 2021-10-12 NOTE — PLAN OF CARE
Goal Outcome Evaluation:              Outcome Summary: Pt admitted yesterday with GI bleed. Large bloody BMs this AM. Pt underwent colonoscopy in early afternoon during which no active bleed was found. GI to be notified if any further bleeding is noted. Awaiting repeat hgb at this time. Possibly back to [facility] DC pending PT eval.

## 2021-10-13 ENCOUNTER — INPATIENT HOSPITAL (AMBULATORY)
Dept: URBAN - METROPOLITAN AREA HOSPITAL 84 | Facility: HOSPITAL | Age: 86
End: 2021-10-13
Payer: MEDICARE

## 2021-10-13 DIAGNOSIS — K57.30 DIVERTICULOSIS OF LARGE INTESTINE WITHOUT PERFORATION OR ABS: ICD-10-CM

## 2021-10-13 DIAGNOSIS — Z85.038 PERSONAL HISTORY OF OTHER MALIGNANT NEOPLASM OF LARGE INTEST: ICD-10-CM

## 2021-10-13 DIAGNOSIS — K64.1 SECOND DEGREE HEMORRHOIDS: ICD-10-CM

## 2021-10-13 DIAGNOSIS — K62.5 HEMORRHAGE OF ANUS AND RECTUM: ICD-10-CM

## 2021-10-13 DIAGNOSIS — D50.0 IRON DEFICIENCY ANEMIA SECONDARY TO BLOOD LOSS (CHRONIC): ICD-10-CM

## 2021-10-13 LAB
BASOPHILS # BLD AUTO: 0.1 10*3/MM3 (ref 0–0.2)
BASOPHILS NFR BLD AUTO: 0.9 % (ref 0–1.5)
BH BB BLOOD EXPIRATION DATE: NORMAL
BH BB BLOOD TYPE BARCODE: 6200
BH BB DISPENSE STATUS: NORMAL
BH BB PRODUCT CODE: NORMAL
BH BB UNIT NUMBER: NORMAL
CROSSMATCH INTERPRETATION: NORMAL
DEPRECATED RDW RBC AUTO: 44.2 FL (ref 37–54)
EOSINOPHIL # BLD AUTO: 0.2 10*3/MM3 (ref 0–0.4)
EOSINOPHIL NFR BLD AUTO: 2.4 % (ref 0.3–6.2)
ERYTHROCYTE [DISTWIDTH] IN BLOOD BY AUTOMATED COUNT: 14.4 % (ref 12.3–15.4)
HCT VFR BLD AUTO: 22.8 % (ref 34–46.6)
HCT VFR BLD AUTO: 24 % (ref 34–46.6)
HGB BLD-MCNC: 7.8 G/DL (ref 12–15.9)
HGB BLD-MCNC: 8.1 G/DL (ref 12–15.9)
LYMPHOCYTES # BLD AUTO: 2.5 10*3/MM3 (ref 0.7–3.1)
LYMPHOCYTES NFR BLD AUTO: 34.6 % (ref 19.6–45.3)
MAGNESIUM SERPL-MCNC: 1.9 MG/DL (ref 1.6–2.4)
MCH RBC QN AUTO: 30.6 PG (ref 26.6–33)
MCHC RBC AUTO-ENTMCNC: 34.4 G/DL (ref 31.5–35.7)
MCV RBC AUTO: 89.1 FL (ref 79–97)
MONOCYTES # BLD AUTO: 0.7 10*3/MM3 (ref 0.1–0.9)
MONOCYTES NFR BLD AUTO: 9.4 % (ref 5–12)
NEUTROPHILS NFR BLD AUTO: 3.7 10*3/MM3 (ref 1.7–7)
NEUTROPHILS NFR BLD AUTO: 52.7 % (ref 42.7–76)
NRBC BLD AUTO-RTO: 0.1 /100 WBC (ref 0–0.2)
PLATELET # BLD AUTO: 136 10*3/MM3 (ref 140–450)
PMV BLD AUTO: 7.5 FL (ref 6–12)
POTASSIUM SERPL-SCNC: 4 MMOL/L (ref 3.5–5.2)
RBC # BLD AUTO: 2.56 10*6/MM3 (ref 3.77–5.28)
UNIT  ABO: NORMAL
UNIT  RH: NORMAL
WBC # BLD AUTO: 7.1 10*3/MM3 (ref 3.4–10.8)

## 2021-10-13 PROCEDURE — 97530 THERAPEUTIC ACTIVITIES: CPT

## 2021-10-13 PROCEDURE — 85014 HEMATOCRIT: CPT | Performed by: HOSPITALIST

## 2021-10-13 PROCEDURE — 97165 OT EVAL LOW COMPLEX 30 MIN: CPT

## 2021-10-13 PROCEDURE — 84132 ASSAY OF SERUM POTASSIUM: CPT | Performed by: INTERNAL MEDICINE

## 2021-10-13 PROCEDURE — 83735 ASSAY OF MAGNESIUM: CPT | Performed by: INTERNAL MEDICINE

## 2021-10-13 PROCEDURE — 85018 HEMOGLOBIN: CPT | Performed by: HOSPITALIST

## 2021-10-13 PROCEDURE — 99232 SBSQ HOSP IP/OBS MODERATE 35: CPT | Performed by: NURSE PRACTITIONER

## 2021-10-13 PROCEDURE — 85025 COMPLETE CBC W/AUTO DIFF WBC: CPT | Performed by: INTERNAL MEDICINE

## 2021-10-13 PROCEDURE — 36415 COLL VENOUS BLD VENIPUNCTURE: CPT | Performed by: INTERNAL MEDICINE

## 2021-10-13 PROCEDURE — 99232 SBSQ HOSP IP/OBS MODERATE 35: CPT | Performed by: HOSPITALIST

## 2021-10-13 PROCEDURE — 97162 PT EVAL MOD COMPLEX 30 MIN: CPT

## 2021-10-13 RX ADMIN — ATORVASTATIN CALCIUM 10 MG: 10 TABLET, FILM COATED ORAL at 09:54

## 2021-10-13 RX ADMIN — PANTOPRAZOLE SODIUM 40 MG: 40 INJECTION, POWDER, FOR SOLUTION INTRAVENOUS at 20:00

## 2021-10-13 RX ADMIN — PANTOPRAZOLE SODIUM 40 MG: 40 INJECTION, POWDER, FOR SOLUTION INTRAVENOUS at 09:54

## 2021-10-13 RX ADMIN — DONEPEZIL HYDROCHLORIDE 10 MG: 5 TABLET, FILM COATED ORAL at 20:00

## 2021-10-13 RX ADMIN — Medication 10 ML: at 09:54

## 2021-10-13 RX ADMIN — Medication 10 ML: at 20:00

## 2021-10-13 NOTE — CASE MANAGEMENT/SOCIAL WORK
Continued Stay Note  JEREMY Stan     Patient Name: Flori Tubbs  MRN: [id number]  Today's Date: 10/13/2021    Admit Date: 10/11/2021     Discharge Plan     Row Name 10/13/21 1605       Plan    Plan anticipate DC to inpt rehab- referrals sent to 1)[facility] 2)- pending. will need precert. will need [facility].    Plan Comments [place] [facility] cannot take patient due to medical cond. [facility] has no beds. Cm met with patient and patient daughter to obtain more choices. gave ECF list. choices listed above. referrals sent and liaisons notified.                   Expected Discharge Date and Time     Expected Discharge Date Expected Discharge Time    Oct 14, 2021         Phone communication or documentation only - no physical contact with patient or family.      Dee Hua RN

## 2021-10-13 NOTE — PLAN OF CARE
Goal Outcome Evaluation:  Pt received 1 unit PRBC yesterday after colonoscopy. Pt has had zero BM's today. Pt to have q8 H&Hs. Plan to discharge to inpt rehab once medically stable. Will continue to monitor.     Problem: Adult Inpatient Plan of Care  Goal: Plan of Care Review  Outcome: Ongoing, Progressing  Goal: Patient-Specific Goal (Individualized)  Outcome: Ongoing, Progressing  Goal: Absence of Hospital-Acquired Illness or Injury  Outcome: Ongoing, Progressing  Intervention: Identify and Manage Fall Risk  Recent Flowsheet Documentation  Taken 10/13/2021 1200 by Renata Galvan RN  Safety Promotion/Fall Prevention:   activity supervised   assistive device/personal items within reach   clutter free environment maintained   nonskid shoes/slippers when out of bed   safety round/check completed  Taken 10/13/2021 1000 by Renata Galvan RN  Safety Promotion/Fall Prevention:   activity supervised   assistive device/personal items within reach   clutter free environment maintained   nonskid shoes/slippers when out of bed   safety round/check completed  Taken 10/13/2021 0800 by Renata Galvan RN  Safety Promotion/Fall Prevention:   activity supervised   assistive device/personal items within reach   clutter free environment maintained   nonskid shoes/slippers when out of bed   safety round/check completed  Intervention: Prevent Infection  Recent Flowsheet Documentation  Taken 10/13/2021 1200 by Renata Galvan RN  Infection Prevention:   hand hygiene promoted   personal protective equipment utilized  Taken 10/13/2021 1000 by Renata Galvan RN  Infection Prevention:   hand hygiene promoted   personal protective equipment utilized  Taken 10/13/2021 0800 by Renata Galvan RN  Infection Prevention:   hand hygiene promoted   personal protective equipment utilized  Goal: Optimal Comfort and Wellbeing  Outcome: Ongoing, Progressing  Goal: Readiness for Transition of Care  Outcome: Ongoing,  Progressing

## 2021-10-13 NOTE — DISCHARGE PLACEMENT REQUEST
"Diego Sales (89 y.o. Female)             Date of Birth Social Security Number Address Home Phone MRN    06/02/1932  [facility] [place] [address] [phone] [id number]    Judaism Marital Status             Presbyterian        Admission Date Admission Type Admitting Provider Attending Provider Department, Room/Bed    10/11/21 Emergency Keisha Montes MD Hall, Kelli G, MD [facility], 2114/1    Discharge Date Discharge Disposition Discharge Destination                         Attending Provider: Keisha Montes MD    Allergies: Epinephrine, Sulfamethoxazole-trimethoprim    Isolation: None   Infection: None   Code Status: CPR   Advance Care Planning Activity    Ht: 167.6 cm (66\")   Wt: 58.1 kg (128 lb 1.4 oz)    Admission Cmt: None   Principal Problem: Gastrointestinal hemorrhage [K92.2]                 Active Insurance as of 10/11/2021     Primary Coverage     Payor Plan Insurance Group Employer/Plan Group    HUMANA MEDICARE REPLACEMENT HUMANA MEDICARE REPLACEMENT [id number]     Payor Plan Address Payor Plan Phone Number Payor Plan Fax Number Effective Dates    [address] [address] [phone]  1/1/2018 - None Entered    [facility] [id number]       Subscriber Name Subscriber Birth Date Member ID       DIEGO SALES 6/2/1932 [id number]                 Emergency Contacts      (Rel.) Home Phone Work Phone Mobile Phone    betty joseph (Daughter) -- -- [phone]    mila alamo (Son) -- -- [phone]              "

## 2021-10-13 NOTE — PROGRESS NOTES
[facility] Medicine Services Daily Progress Note    Patient Name: Flori Tubbs  : 1932  MRN: [id number]  Primary Care Physician:  Umberto White MD  Date of admission: 10/11/2021      Subjective      Chief Complaint: Fainting spell      Patient Reports   10/12/2021: Patient denies specific complaints at this time.  She has had no further bloody stools per the bedside nurse and her daughter at the bedside.  Patient and her daughter were counseled and all questions were answered.    Review of Systems   All other systems reviewed and are negative.       Objective      Vitals:   Temp:  [98.1 °F (36.7 °C)-100 °F (37.8 °C)] 98.1 °F (36.7 °C)  Heart Rate:  [] 96  Resp:  [16-20] 18  BP: ()/(27-54) 104/49    Physical Exam  Vitals and nursing note reviewed.   Constitutional:       General: She is not in acute distress.     Appearance: Normal appearance. She is normal weight. She is not ill-appearing, toxic-appearing or diaphoretic.   HENT:      Mouth/Throat:      Mouth: Mucous membranes are moist.   Eyes:      General: No scleral icterus.     Conjunctiva/sclera: Conjunctivae normal.   Cardiovascular:      Rate and Rhythm: Normal rate and regular rhythm.      Pulses: Normal pulses.      Heart sounds: Normal heart sounds.   Pulmonary:      Effort: Pulmonary effort is normal.      Breath sounds: Normal breath sounds.   Abdominal:      General: Abdomen is flat. Bowel sounds are normal. There is no distension.      Palpations: Abdomen is soft.      Tenderness: There is no abdominal tenderness.   Musculoskeletal:         General: No swelling or tenderness.      Right lower leg: No edema.      Left lower leg: No edema.   Skin:     General: Skin is warm and dry.   Neurological:      General: No focal deficit present.      Mental Status: She is alert.   Psychiatric:         Mood and Affect: Mood normal.         Behavior: Behavior normal.             Result Review    Result Review:  I have  personally reviewed the results from the time of this admission to 10/13/2021 14:56 EDT and agree with these findings:  [x]  Laboratory  [x]  Microbiology  [x]  Radiology  [x]  EKG/Telemetry   [x]  Cardiology/Vascular   []  Pathology  [x]  Old records  []  Other:  Most notable findings include: COVID-19 test negative.  Hemoglobin dropped to 6.2 from 8.9 and subsequently improved 8.1 with 1 unit PRBC.    Assessment/Plan      Brief Patient Summary:  Flori Tubbs is a 89 y.o. female with a history of dementia, CAD s/p stent placement, HTN, HLD, CVA, colon cancer, and COVID-19 (11/05/20) who [facility] on 10/11/2021 complaining of fainting at home and then she noticed significant bright red blood per rectum.  She received 1 unit PRBC for hemoglobin of 6.6 with improvement in her hemoglobin however she continued to have bright red bleeding per rectum.  Colonoscopy 10/12/2021 showed diverticulosis, hemorrhoids and several small polyps that were not removed as well as bright red blood with no source of bleeding identified.    Current inpatient medications include:  acetaminophen, 650 mg, Oral, Once   Or  acetaminophen, 650 mg, Oral, Once   Or  acetaminophen, 650 mg, Rectal, Once  atorvastatin, 10 mg, Oral, Daily  diphenhydrAMINE, 25 mg, Oral, Once   Or  diphenhydrAMINE, 25 mg, Intravenous, Once  donepezil, 10 mg, Oral, Nightly  pantoprazole, 40 mg, Intravenous, Q12H  sodium chloride, 10 mL, Intravenous, Q12H             Active Hospital Problems:  Active Hospital Problems    Diagnosis    • **Gastrointestinal hemorrhage    • Syncope    • Acute blood loss anemia    • Memory impairment    • History of CVA (cerebrovascular accident)    • Primary hypertension    • History of colon cancer    • Chronic coronary artery disease    • Dyslipidemia    • Glaucoma      Plan:     Gastrointestinal hemorrhage  Acute blood loss anemia  -hgb 8.4 on admission, compared to 13.0 on 09/26/21  -Hemoglobin dropped to 6.6  and the patient was given 1 unit PRBC  -monitor serial H&H, transfuse for hgb <7.0 or hemodynamic instability  -PPI BID  -GI consulted and performed colonoscopy which showed diverticulosis, small polyps and hemorrhoids with some fresh blood in the colon but no signs of active bleeding  -hold aspirin     Syncope due to orthostatic hypotension secondary to GI bleed  -given 2 L IVF in ER  -serial troponin x3 negative  -continuous cardiac monitoring  -fall precautions     Chronic coronary artery disease / Dyslipidemia / Primary hypertension, chronic with relative hypotension  -hold aspirin d/t GIB  -hold antihypertensives d/t borderline hypotension  -continue statin  -monitor BP     History of colon cancer, s/p resection     Glaucoma  -continue home eye drops      History of CVA (cerebrovascular accident)  -hold aspirin, continue statin as above     Memory impairment  -continue donepezil     DVT prophylaxis:  Mechanical DVT prophylaxis orders are present.    CODE STATUS:    Code Status: CPR  Medical Interventions (Level of Support Prior to Arrest): Full      Disposition:  I expect patient to be discharged as hemoglobin is stable if she has no further signs of bleeding..    This patient has been examined wearing appropriate Personal Protective Equipment and discussed with hospital infection control department. 10/13/21      Electronically signed by Keisha Montes MD, 10/13/21, 14:56 EDT.  [facility]ist Team

## 2021-10-13 NOTE — THERAPY EVALUATION
Patient Name: Flori Tubbs  : 1932    MRN: [id number]                              Today's Date: 10/13/2021       Admit Date: 10/11/2021    Visit Dx:     ICD-10-CM ICD-9-CM   1. Lower GI bleed  K92.2 578.9   2. Gastrointestinal hemorrhage, unspecified gastrointestinal hemorrhage type  K92.2 578.9     Patient Active Problem List   Diagnosis   • Chronic pain of both knees   • Anaclitic depression   • Anemia, iron deficiency   • Arthritis   • Asthma   • Atypical chest pain   • Chronic coronary artery disease   • History of colon cancer   • Complete uterovaginal prolapse   • Uterine prolapse   • Dyslipidemia   • Dysphagia   • Dyspnea on exertion   • Fatigue   • Weakness   • Glaucoma   • History of prosthetic heart valve   • Primary hypertension   • Localized infection of skin   • Mediastinal mass   • Multiple thyroid nodules   • Myalgia   • Other screening mammogram   • Palpitations   • Panic attack   • Postnasal drip   • Sebaceous cyst   • Skin disorder   • History of CVA (cerebrovascular accident)   • Vocal cord paralysis   • Pain in joint involving lower leg   • Anxiety   • Medicare annual wellness visit, subsequent   • Urge incontinence of urine   • Pressure injury of sacral region, stage 1   • Chest wall pain   • Right shoulder pain   • Memory impairment   • Post-menopausal   • Gastrointestinal hemorrhage   • Syncope   • Acute blood loss anemia     Past Medical History:   Diagnosis Date   • Arthritis    • CAD (coronary artery disease)    • Colon cancer (HCC)    • COVID-19 2020   • CVA (cerebral vascular accident) (HCC)    • Dementia (HCC)    • Difficulty walking    • Glaucoma 2012   • Hypertension    • Myocardial infarction (HCC)    • Palpitation    • TIA (transient ischemic attack)    • Weakness      Past Surgical History:   Procedure Laterality Date   • CATARACT EXTRACTION     •  SECTION     • COLON SURGERY  2018   • MASS EXCISION  colon      General Information     Row Name 10/13/21  1657          OT Time and Intention    Document Type evaluation  -     Mode of Treatment occupational therapy  -     Row Name 10/13/21 1657          General Information    Patient Profile Reviewed yes  -     Prior Level of Function independent:; ADL's  -     Existing Precautions/Restrictions fall  -     Row Name 10/13/21 1657          Living Environment    Lives With facility resident  -     Row Name 10/13/21 1657          Cognition    Orientation Status (Cognition) oriented x 3  -     Row Name 10/13/21 1657          Safety Issues, Functional Mobility    Impairments Affecting Function (Mobility) balance; endurance/activity tolerance; motor planning; shortness of breath; strength  -           User Key  (r) = Recorded By, (t) = Taken By, (c) = Cosigned By    Initials Name Provider Type    Chico Muir OT Occupational Therapist                 Mobility/ADL's     Row Name 10/13/21 1658          Transfers    Sit-Stand Moss (Transfers) contact guard  -     Row Name 10/13/21 1658          Sit-Stand Transfer    Assistive Device (Sit-Stand Transfers) walker, front-wheeled  -     Row Name 10/13/21 1658          Activities of Daily Living    BADL Assessment/Intervention lower body dressing; feeding  -     Row Name 10/13/21 1658          Lower Body Dressing Assessment/Training    Moss Level (Lower Body Dressing) don; doff; socks; maximum assist (25% patient effort)  -     Row Name 10/13/21 1658          Self-Feeding Assessment/Training    Moss Level (Feeding) feeding skills; set up  -           User Key  (r) = Recorded By, (t) = Taken By, (c) = Cosigned By    Initials Name Provider Type    Chico Muir OT Occupational Therapist               Obj/Interventions     Row Name 10/13/21 1658          Range of Motion Comprehensive    Comment, General Range of Motion R shoulder AROM impacted 25-50%  -     Row Name 10/13/21 1658          Strength Comprehensive (MMT)     Comment, General Manual Muscle Testing (MMT) Assessment BUE 3+/5  -MP           User Key  (r) = Recorded By, (t) = Taken By, (c) = Cosigned By    Initials Name Provider Type    Chico Muir OT Occupational Therapist               Goals/Plan     Row Name 10/13/21 1704          Bed Mobility Goal 1 (OT)    Activity/Assistive Device (Bed Mobility Goal 1, OT) bed mobility activities, all  -MP     Jefferson Level/Cues Needed (Bed Mobility Goal 1, OT) contact guard assist  -MP     Time Frame (Bed Mobility Goal 1, OT) long term goal (LTG); 2 weeks  -MP     Row Name 10/13/21 1704          Transfer Goal 1 (OT)    Activity/Assistive Device (Transfer Goal 1, OT) sit-to-stand/stand-to-sit; toilet  -MP     Jefferson Level/Cues Needed (Transfer Goal 1, OT) contact guard assist  -MP     Time Frame (Transfer Goal 1, OT) long term goal (LTG); 2 weeks  -MP     Row Name 10/13/21 1704          Dressing Goal 1 (OT)    Activity/Device (Dressing Goal 1, OT) lower body dressing  -MP     Jefferson/Cues Needed (Dressing Goal 1, OT) minimum assist (75% or more patient effort)  -MP     Time Frame (Dressing Goal 1, OT) long term goal (LTG); 2 weeks  -MP           User Key  (r) = Recorded By, (t) = Taken By, (c) = Cosigned By    Initials Name Provider Type    Chico Muir OT Occupational Therapist               Clinical Impression     Row Name 10/13/21 6974          Pain Scale: Numbers Pre/Post-Treatment    Pretreatment Pain Rating 0/10 - no pain  -MP     Posttreatment Pain Rating 0/10 - no pain  -MP     Row Name 10/13/21 7767          Plan of Care Review    Plan of Care Reviewed With patient  -MP     Progress no change  -MP     Outcome Summary Pt. is an 89 y/o female after fall at home w/ syncope. Pt. found ot have gastroinstetinal hemorrhage w/ acute blood loss. Pt. states she lives alone at baseline and maintains ADL independence w/ children living close by to provide IADL support. Pt. reuqires min A for  functional transfers this date and increased assist for LB ADLs secondary global weakness w/ impacts to sitting/standing balance. Pt. not safe to d/c home alone at this time and will require IP rehab at d/c to address aforementioned deficits, will follow up w/ pt. 1-3x per week at [facility].  -MP     Row Name 10/13/21 1659          Therapy Assessment/Plan (OT)    Rehab Potential (OT) good, to achieve stated therapy goals  -MP     Criteria for Skilled Therapeutic Interventions Met (OT) yes  -MP     Therapy Frequency (OT) 3 times/wk  -MP     Row Name 10/13/21 1659          Therapy Plan Review/Discharge Plan (OT)    Anticipated Discharge Disposition (OT) inpatient rehabilitation facility  -MP     Row Name 10/13/21 1659          Vital Signs    Pre Patient Position Sitting  -MP     Intra Patient Position Sitting  -MP     Post Patient Position Sitting  -MP     Row Name 10/13/21 1659          Positioning and Restraints    Pre-Treatment Position sitting in chair/recliner  -MP     Post Treatment Position chair  -MP     In Chair sitting; call light within reach; encouraged to call for assist; exit alarm on  -MP           User Key  (r) = Recorded By, (t) = Taken By, (c) = Cosigned By    Initials Name Provider Type    Chico Muir, OT Occupational Therapist               Outcome Measures     Row Name 10/13/21 105          How much help from another person do you currently need...    Turning from your back to your side while in flat bed without using bedrails? 3  -MAXINE     Moving from lying on back to sitting on the side of a flat bed without bedrails? 3  -MAXINE     Moving to and from a bed to a chair (including a wheelchair)? 3  -MAXINE     Standing up from a chair using your arms (e.g., wheelchair, bedside chair)? 3  -MAXINE     Climbing 3-5 steps with a railing? 2  -MAXINE     To walk in hospital room? 2  -MAXINE     AM-PAC 6 Clicks Score (PT) 16  -MAXINE     Row Name 10/13/21 1284          Functional Assessment    Outcome Measure Options AM-PAC  6 Clicks Basic Mobility (PT)  -MAXINE           User Key  (r) = Recorded By, (t) = Taken By, (c) = Cosigned By    Initials Name Provider Type    Stefani Denis PT Physical Therapist                Occupational Therapy Education                 Title: PT OT SLP Therapies (In Progress)     Topic: Occupational Therapy (In Progress)     Point: ADL training (Not Started)     Description:   Instruct learner(s) on proper safety adaptation and remediation techniques during self care or transfers.   Instruct in proper use of assistive devices.              Learner Progress:  Not documented in this visit.          Point: Home exercise program (Not Started)     Description:   Instruct learner(s) on appropriate technique for monitoring, assisting and/or progressing therapeutic exercises/activities.              Learner Progress:  Not documented in this visit.          Point: Precautions (Not Started)     Description:   Instruct learner(s) on prescribed precautions during self-care and functional transfers.              Learner Progress:  Not documented in this visit.          Point: Body mechanics (Done)     Description:   Instruct learner(s) on proper positioning and spine alignment during self-care, functional mobility activities and/or exercises.              Learning Progress Summary           Patient Acceptance, E,TB, VU by  at 10/13/2021 1705                               User Key     Initials Effective Dates Name Provider Type Discipline     06/16/21 -  Chico Velázquez OT Occupational Therapist OT              OT Recommendation and Plan  Therapy Frequency (OT): 3 times/wk  Plan of Care Review  Plan of Care Reviewed With: patient  Progress: no change  Outcome Summary: Pt. is an 89 y/o female after fall at home w/ syncope. Pt. found ot have gastroinstetinal hemorrhage w/ acute blood loss. Pt. states she lives alone at baseline and maintains ADL independence w/ children living close by to provide IADL support. Pt.  reuqires min A for functional transfers this date and increased assist for LB ADLs secondary global weakness w/ impacts to sitting/standing balance. Pt. not safe to d/c home alone at this time and will require IP rehab at d/c to address aforementioned deficits, will follow up w/ pt. 1-3x per week at [facility].     Time Calculation:    Time Calculation- OT     Row Name 10/13/21 1705             Time Calculation- OT    OT Start Time 1240  -MP      OT Stop Time 1300  -MP      OT Time Calculation (min) 20 min  -MP      Total Timed Code Minutes- OT 0 minute(s)  -MP      OT Received On 10/13/21  -      OT - Next Appointment 10/15/21  -      OT Goal Re-Cert Due Date 10/27/21  -            User Key  (r) = Recorded By, (t) = Taken By, (c) = Cosigned By    Initials Name Provider Type    MP Chico Velázquez OT Occupational Therapist              Therapy Charges for Today     Code Description Service Date Service Provider Modifiers Qty    [id number] HC OT EVAL LOW COMPLEXITY 3 10/13/2021 Chico Velázquez OT GO 1               Chico Velázquez OT  10/13/2021

## 2021-10-13 NOTE — PROGRESS NOTES
LOS: 2 days   Patient Care Team:  Umberto White MD as PCP - General  Umberto White MD as PCP - Family Medicine  Taurus Robles MD as Consulting Physician (Cardiology)      Subjective     Subjective: Patient with no further rectal bleeding.  Denies abdominal pain      ROS:   Review of Systems   Respiratory: Negative for cough and shortness of breath.    Cardiovascular: Negative for chest pain and palpitations.   Gastrointestinal: Negative for abdominal pain, blood in stool and nausea.   Neurological: Positive for weakness.   Psychiatric/Behavioral: Positive for confusion. Negative for agitation.        Medication Review:   Scheduled Meds:acetaminophen, 650 mg, Oral, Once   Or  acetaminophen, 650 mg, Oral, Once   Or  acetaminophen, 650 mg, Rectal, Once  atorvastatin, 10 mg, Oral, Daily  diphenhydrAMINE, 25 mg, Oral, Once   Or  diphenhydrAMINE, 25 mg, Intravenous, Once  donepezil, 10 mg, Oral, Nightly  pantoprazole, 40 mg, Intravenous, Q12H  sodium chloride, 10 mL, Intravenous, Q12H      Continuous Infusions:   PRN Meds:.•  acetaminophen **OR** acetaminophen **OR** acetaminophen  •  aluminum-magnesium hydroxide-simethicone  •  magnesium sulfate **OR** magnesium sulfate in D5W 1g/100mL (PREMIX)  •  melatonin  •  nitroglycerin  •  ondansetron **OR** ondansetron  •  potassium chloride  •  potassium chloride  •  [COMPLETED] Insert peripheral IV **AND** sodium chloride  •  [COMPLETED] Insert peripheral IV **AND** sodium chloride  •  sodium chloride      Objective     Vital Signs  Temp:  [98.2 °F (36.8 °C)-100 °F (37.8 °C)] 99 °F (37.2 °C)  Heart Rate:  [] 112  Resp:  [14-20] 16  BP: ()/(27-56) 94/54    Physical Exam:    General Appearance:    Awake and alert with pleasant confusion, in no acute distress   Head:    Normocephalic, without obvious abnormality   Eyes:          Conjunctivae normal, anicteric sclera   Ears:    Hearing intact   Throat:   No oral lesions, no thrush, oral mucosa moist    Neck:   No adenopathy, supple, no JVD   Lungs:     Respirations regular, even and unlabored       Abdomen:     Soft, non-tender, no rebound or guarding, non-distended, no hepatosplenomegaly   Rectal:     Deferred   Extremities:   No edema, no cyanosis, no redness   Skin:   No bleeding, bruising or rash, no jaundice   Neurologic:   Sensation intact        Results Review:    CBC  Results from last 7 days   Lab Units 10/13/21  0101 10/12/21  1828 10/12/21  0456 10/11/21  2247 10/11/21  1525 10/11/21  1120   RBC 10*6/mm3 2.56*  --  2.90*  --   --  2.73*   WBC 10*3/mm3 7.10  --  9.30  --   --  14.10*   HEMOGLOBIN g/dL 7.8* 6.2* 8.9* 8.3* 6.6* 8.4*   PLATELETS 10*3/mm3 136*  --  169  --   --  227       CMP  Results from last 7 days   Lab Units 10/13/21  0101 10/12/21  0456 10/11/21  1120   SODIUM mmol/L  --  146* 144   POTASSIUM mmol/L 4.0 4.0 4.7   CHLORIDE mmol/L  --  115* 111*   CO2 mmol/L  --  22.0 22.0   BUN mg/dL  --  24* 32*   CREATININE mg/dL  --  0.91 1.02*   GLUCOSE mg/dL  --  96 147*   ALBUMIN g/dL  --   --  3.30*   BILIRUBIN mg/dL  --   --  0.3   ALK PHOS U/L  --   --  44   AST (SGOT) U/L  --   --  13   ALT (SGPT) U/L  --   --  10       Amylase and Lipase        CRP         Imaging Results (Last 24 Hours)     ** No results found for the last 24 hours. 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 Kirkland with no bleeding found but highly suspicious for diverticular bleed.  No further bleeding per nursing staff.  Hemoglobin today 7.8.  Continue to monitor H&H and transfuse as needed.  Recommend no anticoagulation for 1 week.  Advance to full liquid diet and monitor stools for any further bleeding.  Continue supportive care and nurse to call with any further bleeding.    Trupti Orozco, APRN  10/13/21  11:09  EDT

## 2021-10-13 NOTE — PLAN OF CARE
Goal Outcome Evaluation:  Plan of Care Reviewed With: patient        Progress: no change  Outcome Summary: Patient has rested in bed during shift. Patient has exhibited no bloody stools during shift. Patient given blood for low hemoglobin. Repeat hemoglobin 7.8. Will continue to monitor.

## 2021-10-13 NOTE — THERAPY EVALUATION
Patient Name: Flori Tubbs  : 1932    MRN: [id number]                              Today's Date: 10/13/2021       Admit Date: 10/11/2021    Visit Dx:     ICD-10-CM ICD-9-CM   1. Lower GI bleed  K92.2 578.9   2. Gastrointestinal hemorrhage, unspecified gastrointestinal hemorrhage type  K92.2 578.9     Patient Active Problem List   Diagnosis   • Chronic pain of both knees   • Anaclitic depression   • Anemia, iron deficiency   • Arthritis   • Asthma   • Atypical chest pain   • Chronic coronary artery disease   • History of colon cancer   • Complete uterovaginal prolapse   • Uterine prolapse   • Dyslipidemia   • Dysphagia   • Dyspnea on exertion   • Fatigue   • Weakness   • Glaucoma   • History of prosthetic heart valve   • Primary hypertension   • Localized infection of skin   • Mediastinal mass   • Multiple thyroid nodules   • Myalgia   • Other screening mammogram   • Palpitations   • Panic attack   • Postnasal drip   • Sebaceous cyst   • Skin disorder   • History of CVA (cerebrovascular accident)   • Vocal cord paralysis   • Pain in joint involving lower leg   • Anxiety   • Medicare annual wellness visit, subsequent   • Urge incontinence of urine   • Pressure injury of sacral region, stage 1   • Chest wall pain   • Right shoulder pain   • Memory impairment   • Post-menopausal   • Gastrointestinal hemorrhage   • Syncope   • Acute blood loss anemia     Past Medical History:   Diagnosis Date   • Arthritis    • CAD (coronary artery disease)    • Colon cancer (HCC)    • COVID-19 2020   • CVA (cerebral vascular accident) (HCC)    • Dementia (HCC)    • Difficulty walking    • Glaucoma 2012   • Hypertension    • Myocardial infarction (HCC)    • Palpitation    • TIA (transient ischemic attack)    • Weakness      Past Surgical History:   Procedure Laterality Date   • CATARACT EXTRACTION     •  SECTION     • COLON SURGERY  2018   • MASS EXCISION  colon      General Information     Row Name 10/13/21  1039          Physical Therapy Time and Intention    Document Type evaluation  -     Mode of Treatment physical therapy  -     Row Name 10/13/21 1039          General Information    Prior Level of Function independent:; all household mobility; ADL's  ambulates with RW  -     Existing Precautions/Restrictions fall  -     Row Name 10/13/21 1039          Living Environment    Lives With facility resident  IALF  -     Row Name 10/13/21 1039          Home Main Entrance    Number of Stairs, Main Entrance none  -     Row Name 10/13/21 1039          Stairs Within Home, Primary    Number of Stairs, Within Home, Primary none  -     Row Name 10/13/21 1039          Safety Issues, Functional Mobility    Safety Issues Affecting Function (Mobility) at risk behavior observed; awareness of need for assistance; friction/shear risk; insight into deficits/self-awareness; positioning of assistive device; safety precaution awareness; safety precautions follow-through/compliance  -     Impairments Affecting Function (Mobility) balance; endurance/activity tolerance; motor planning; shortness of breath; strength  -           User Key  (r) = Recorded By, (t) = Taken By, (c) = Cosigned By    Initials Name Provider Type    MAXINE Stefani Butler, PT Physical Therapist               Mobility     Row Name 10/13/21 1041          Bed Mobility    Bed Mobility supine-sit  -     Supine-Sit [place] (Bed Mobility) contact guard  -     Assistive Device (Bed Mobility) head of bed elevated; bed rails  -     Comment (Bed Mobility) Increased time required for supine to sit  -     Row Name 10/13/21 1041          Transfers    Comment (Transfers) STS x2 w/ RW. V/c for safety and hand placement w/ RW  -     Row Name 10/13/21 1041          Sit-Stand Transfer    Sit-Stand [place] (Transfers) contact guard  -     Assistive Device (Sit-Stand Transfers) walker, front-wheeled  -     Row Name 10/13/21 1041          Gait/Stairs  (Locomotion)    [place] Level (Gait) contact guard  -     Assistive Device (Gait) walker, front-wheeled  -     Distance in Feet (Gait) 20ft  -     Deviations/Abnormal Patterns (Gait) bilateral deviations; base of support, narrow; scissoring; stride length decreased; gait speed decreased  Pt demonstrated scissoring gait x1, but corrected herself w/out v/c.  -     Comment (Gait/Stairs) Pt reporting SOA following 15ft w/ RW and requiring seated rest break to recover. She and her daughter in law reports this is her normal.  -           User Key  (r) = Recorded By, (t) = Taken By, (c) = Cosigned By    Initials Name Provider Type    Stefani Denis PT Physical Therapist               Obj/Interventions     Row Name 10/13/21 1044          Range of Motion Comprehensive    General Range of Motion bilateral lower extremity ROM WFL  -     Row Name 10/13/21 1044          Strength Comprehensive (MMT)    General Manual Muscle Testing (MMT) Assessment lower extremity strength deficits identified  -     Comment, General Manual Muscle Testing (MMT) Assessment BLE MMT, grossly 3+/5  -     Row Name 10/13/21 1044          Balance    Balance Assessment sitting static balance; sitting dynamic balance; standing static balance; standing dynamic balance  -     Static Sitting Balance WFL  -     Dynamic Sitting Balance mild impairment; unsupported  -     Static Standing Balance WFL; supported  -     Dynamic Standing Balance mild impairment; supported  -     Row Name 10/13/21 1044          Sensory Assessment (Somatosensory)    Sensory Assessment (Somatosensory) sensation intact  -           User Key  (r) = Recorded By, (t) = Taken By, (c) = Cosigned By    Initials Name Provider Type    Stefani Denis PT Physical Therapist               Goals/Plan     Row Name 10/13/21 1055          Bed Mobility Goal 1 (PT)    Activity/Assistive Device (Bed Mobility Goal 1, PT) bed mobility activities, all  -      [place] Level/Cues Needed (Bed Mobility Goal 1, PT) modified independence  -MAXINE     Time Frame (Bed Mobility Goal 1, PT) long term goal (LTG); 2 weeks  -MAXINE     Row Name 10/13/21 1055          Transfer Goal 1 (PT)    Activity/Assistive Device (Transfer Goal 1, PT) transfers, all  -MAXINE     [place] Level/Cues Needed (Transfer Goal 1, PT) modified independence  -MAXINE     Time Frame (Transfer Goal 1, PT) long term goal (LTG); 2 weeks  -     Row Name 10/13/21 1053          Gait Training Goal 1 (PT)    Activity/Assistive Device (Gait Training Goal 1, PT) gait (walking locomotion); walker, rolling  -MAXINE     [place] Level (Gait Training Goal 1, PT) supervision required  -MAXINE     Distance (Gait Training Goal 1, PT) 50ft  -MAXINE     Time Frame (Gait Training Goal 1, PT) long term goal (LTG); 2 weeks  -           User Key  (r) = Recorded By, (t) = Taken By, (c) = Cosigned By    Initials Name Provider Type    Stefani Denis, PT Physical Therapist               Clinical Impression     Row Name 10/13/21 1044          Pain    Additional Documentation Pain Scale: Numbers Pre/Post-Treatment (Group)  -     Row Name 10/13/21 1048          Pain Scale: Numbers Pre/Post-Treatment    Pretreatment Pain Rating 0/10 - no pain  -MAXINE     Posttreatment Pain Rating 0/10 - no pain  -     Row Name 10/13/21 1047          Plan of Care Review    Outcome Summary Pt is an 89 year old female who presents to [facility] on 10/11 following fainting and fall at home. Pt with gastrointestinal hemorrhage and acute blood loss anemia. Pt with a PMH of dementia, CAD s/p stent placement, HTN, HLD, CVA, colon cancer, and COVID-19 (11/05/20). Pt demonstrates impaired activity tolerance, decreased BLE strength (3+/5, grossly), and impaired balance. At [facility] pt lives in an IALF at [facility]. Daughter in law reporting she sleeps most of the day and does not ambulate much, but uses RW when she does. Pt was without reports of dizziness throughout evaluation.  Ambulating 20ft w/ RW, CGA and unable to ambulate further due to SOA. Bed mobility, CGA and transfers CGA w/ RW. Recommend inpatient rehab as pt is unsafe to return home due to impaired functional mobility and decreased activity tolerance. PT will continue to follow during hospital stay.  -     Row Name 10/13/21 1045          Therapy Assessment/Plan (PT)    Predicted Duration of Therapy Intervention (PT) Until d/c  -MAXINE     Row Name 10/13/21 1045          Vital Signs    Pre Systolic BP Rehab 123  supine  -MAXINE     Pre Treatment Diastolic BP 41  supine  -MAXINE     Intra Systolic BP Rehab 122  Seated EOB  -MAXINE     Intra Treatment Diastolic BP 47  Sitting EOB  -MAXINE     Post Systolic BP Rehab 125  Standing at EOB  -MAXINE     Post Treatment Diastolic BP 49  Standing at EOB  -MAXINE     Pretreatment Heart Rate (beats/min) 99  supine  -MAXINE     Intratreatment Heart Rate (beats/min) 132  standing at RW  -MAXINE     Posttreatment Heart Rate (beats/min) 110  Seated in recliner  -MAXINE     O2 Delivery Pre Treatment room air  -MAXINE     Intra SpO2 (%) 97  seated at EOB  -MAXINE     O2 Delivery Intra Treatment room air  -MAXINE     Post SpO2 (%) 99  seated in recliner  -MAXINE     O2 Delivery Post Treatment room air  -     Row Name 10/13/21 1045          Positioning and Restraints    Pre-Treatment Position in bed  -MAXINE     Post Treatment Position chair  -MAXINE     In Chair call light within reach; encouraged to call for assist; sitting; exit alarm on; with family/caregiver  w/ Daughter in law  -MAXINE           User Key  (r) = Recorded By, (t) = Taken By, (c) = Cosigned By    Initials Name Provider Type    Stefani Denis, PT Physical Therapist               Outcome Measures     Row Name 10/13/21 1056          How much help from another person do you currently need...    Turning from your back to your side while in flat bed without using bedrails? 3  -MAXINE     Moving from lying on back to sitting on the side of a flat bed without bedrails? 3  -MAXINE     Moving to and from  a bed to a chair (including a wheelchair)? 3  -MAXINE     Standing up from a chair using your arms (e.g., wheelchair, bedside chair)? 3  -MAXINE     Climbing 3-5 steps with a railing? 2  -MAXINE     To walk in hospital room? 2  -MAXINE     AM-PAC 6 Clicks Score (PT) 16  -     Row Name 10/13/21 1056          Functional Assessment    Outcome Measure Options AM-PAC 6 Clicks Basic Mobility (PT)  -           User Key  (r) = Recorded By, (t) = Taken By, (c) = Cosigned By    Initials Name Provider Type    Stefani Denis, THERESA Physical Therapist                             Physical Therapy Education                 Title: PT OT SLP Therapies (In Progress)     Topic: Physical Therapy (In Progress)     Point: Mobility training (Done)     Learning Progress Summary           Patient Acceptance, E,TB, VU by  at 10/13/2021 1056                   Point: Home exercise program (Not Started)     Learner Progress:  Not documented in this visit.          Point: Body mechanics (Not Started)     Learner Progress:  Not documented in this visit.          Point: Precautions (Done)     Learning Progress Summary           Patient Acceptance, E,TB, VU by  at 10/13/2021 1056                               User Key     Initials Effective Dates Name Provider Type [facility] 08/23/21 -  Stefani Butler PT Physical Therapist PT              PT Recommendation and Plan  Planned Therapy Interventions (PT): balance training, bed mobility training, gait training, home exercise program, motor coordination training, neuromuscular re-education, patient/family education, postural re-education, ROM (range of motion), strengthening, transfer training  Outcome Summary: Pt is an 89 year old female who presents to [facility] on 10/11 following fainting and fall at home. Pt with gastrointestinal hemorrhage and acute blood loss anemia. Pt with a PMH of dementia, CAD s/p stent placement, HTN, HLD, CVA, colon cancer, and COVID-19 (11/05/20). Pt demonstrates impaired  activity tolerance, decreased BLE strength (3+/5, grossly), and impaired balance. At [facility] pt lives in an IALF at [facility]. Daughter in law reporting she sleeps most of the day and does not ambulate much, but uses RW when she does. Pt was without reports of dizziness throughout evaluation. Ambulating 20ft w/ RW, CGA and unable to ambulate further due to SOA. Bed mobility, CGA and transfers CGA w/ RW. Recommend inpatient rehab as pt is unsafe to return home due to impaired functional mobility and decreased activity tolerance. PT will continue to follow during hospital stay.     Time Calculation:    PT Charges     Row Name 10/13/21 1058             Time Calculation    Start Time 0856  -MAXINE      Stop Time 0937  -MAXINE      Time Calculation (min) 41 min  -MAXINE      PT Received On 10/13/21  -MAXINE      PT - Next Appointment 10/14/21  -MAXINE      PT Goal Re-Cert Due Date 10/27/21  -MAXINE              Time Calculation- PT    Total Timed Code Minutes- PT 15 minute(s)  -MAXINE            User Key  (r) = Recorded By, (t) = Taken By, (c) = Cosigned By    Initials Name Provider Type    Stefani Denis, PT Physical Therapist              Therapy Charges for Today     Code Description Service Date Service Provider Modifiers Qty    63958173056  PT EVAL MOD COMPLEXITY 4 10/13/2021 Stefani Butler, PT GP 1    [id number]  PT THERAPEUTIC ACT EA 15 MIN 10/13/2021 Stefani Butler, PT GP 1          PT G-Codes  Outcome Measure Options: AM-PAC 6 Clicks Basic Mobility (PT)  AM-PAC 6 Clicks Score (PT): 16    Stefani Butler PT  10/13/2021

## 2021-10-13 NOTE — PLAN OF CARE
Goal Outcome Evaluation:        Outcome Summary: Pt is an 89 year old female who presents to [facility] on 10/11 following fainting and fall at home. Pt with gastrointestinal hemorrhage and acute blood loss anemia. Pt with a PMH of dementia, CAD s/p stent placement, HTN, HLD, CVA, colon cancer, and COVID-19 (11/05/20). Pt demonstrates impaired activity tolerance, decreased BLE strength (3+/5, grossly), and impaired balance. At [facility] pt lives in an IALF at [facility]. Daughter in law reporting she sleeps most of the day and does not ambulate much, but uses RW when she does. Pt was without reports of dizziness throughout evaluation. Ambulating 20ft w/ RW, CGA and unable to ambulate further due to SOA. Bed mobility, CGA and transfers CGA w/ RW. Recommend inpatient rehab as pt is unsafe to return home due to impaired functional mobility and decreased activity tolerance. PT will continue to follow during hospital stay.

## 2021-10-13 NOTE — CONSULTS
"Nutrition Services    Patient Name: Flori Tubbs  YOB: 1932  MRN: [id number]  Admission date: 10/11/2021    Comments:  -Vanilla Boost Plus TID (provides 1080 kcals, 42 g protein if consumed)      -Moderate chronic disease related malnutrition related to past medical history as evidenced by muscle and fat wasting as noted on physical exam.    PPE Documentation        PPE Worn By Provider mask, gloves and eye protection   PPE Worn By Patient  None     CLINICAL NUTRITION ASSESSMENT      Reason for Assessment 10/13: MST of 2     H&P  Fainting at home    Past Medical History:   Diagnosis Date   • Arthritis    • CAD (coronary artery disease)    • Colon cancer (HCC)    • COVID-19 2020   • CVA (cerebral vascular accident) (HCC)    • Dementia (HCC)    • Difficulty walking    • Glaucoma 2012   • Hypertension    • Myocardial infarction (HCC)    • Palpitation    • TIA (transient ischemic attack)    • Weakness        Past Surgical History:   Procedure Laterality Date   • CATARACT EXTRACTION     •  SECTION     • COLON SURGERY  2018   • MASS EXCISION  colon        Current Problems   Gastrointestinal hemorrhage  Acute blood loss anemia  Syncope  Chronic coronary artery disease / Dyslipidemia / Primary hypertension, chronic  History of colon cancer, s/p resection  Glaucoma  History of CVA (cerebrovascular accident)   Memory impairment     Encounter Information        Trending Narrative     10/13: RD visited patient and son at bedside.  Son states patient has been losing weight gradually.  Patient states drinks Vanilla Boost at home.     Anthropometrics        Current Height, Weight Height: 167.6 cm (66\")  Weight: 58.1 kg (128 lb 1.4 oz) (10/13/21 0500)       Ideal Body Weight (IBW) 130#   Usual Body Weight (UBW) 153# in the past per son       Trending Weight Hx  This admission:  128-130# range    PTA:  Stable weight loss x 3 months  18# weight loss x 1 year (12%)    Wt Readings from Last 30 " Encounters:   10/13/21 0500 58.1 kg (128 lb 1.4 oz)   10/12/21 0516 59.4 kg (130 lb 15.3 oz)   10/11/21 1940 59.4 kg (130 lb 15.3 oz)   10/11/21 1056 59 kg (130 lb)   09/26/21 2131 58.1 kg (128 lb 1.4 oz)   07/22/21 1107 57.4 kg (126 lb 9.6 oz)   07/02/21 1322 58.1 kg (128 lb)   06/15/21 1003 59 kg (130 lb)   05/17/21 1155 60.3 kg (133 lb)   05/12/21 1235 61.1 kg (134 lb 9.6 oz)   03/11/21 1510 60.8 kg (134 lb)   01/04/21 1126 62.6 kg (138 lb)   11/05/20 1412 66.2 kg (146 lb)   09/24/20 1052 65.8 kg (145 lb)   08/18/20 1341 66.7 kg (147 lb)   07/20/20 0955 65.8 kg (145 lb)   07/09/20 1311 66.7 kg (147 lb)   06/05/20 1027 68 kg (150 lb)   09/24/19 1252 68.9 kg (152 lb)   07/05/19 1546 69.4 kg (153 lb)   06/22/19 2006 68.7 kg (151 lb 7.3 oz)   04/05/19 1203 71.2 kg (157 lb)   11/20/18 0727 69.4 kg (153 lb)   09/21/18 1020 68.8 kg (151 lb 12 oz)   09/18/18 1251 68.5 kg (151 lb)   08/23/18 0749 67.6 kg (149 lb)   08/22/18 1041 67.6 kg (149 lb)   02/08/18 0726 68 kg (150 lb)   02/05/18 1521 67.4 kg (148 lb 8 oz)   09/15/17 1253 67.6 kg (149 lb)   08/04/17 1235 66.8 kg (147 lb 4 oz)   06/27/17 1528 66.2 kg (146 lb)   05/02/17 1333 68.5 kg (151 lb)        BMI kg/m2 Body mass index is 20.67 kg/m².       Labs/Medications         Pertinent Labs -   Results from last 7 days   Lab Units 10/13/21  0101 10/12/21  0456 10/11/21  1120   SODIUM mmol/L  --  146* 144   POTASSIUM mmol/L 4.0 4.0 4.7   CHLORIDE mmol/L  --  115* 111*   CO2 mmol/L  --  22.0 22.0   BUN mg/dL  --  24* 32*   CREATININE mg/dL  --  0.91 1.02*   CALCIUM mg/dL  --  9.0 8.8   BILIRUBIN mg/dL  --   --  0.3   ALK PHOS U/L  --   --  44   ALT (SGPT) U/L  --   --  10   AST (SGOT) U/L  --   --  13   GLUCOSE mg/dL  --  96 147*     Results from last 7 days   Lab Units 10/13/21  0101 10/12/21  1828 10/12/21  0456   MAGNESIUM mg/dL 1.9  --  2.0   HEMOGLOBIN g/dL 7.8*   < > 8.9*   HEMATOCRIT % 22.8*   < > 26.3*    < > = values in this interval not displayed.     COVID19    Date Value Ref Range Status   10/11/2021 Not Detected Not Detected - Ref. Range Final     Lab Results   Component Value Date    HGBA1C 5.3 06/23/2021         Pertinent Medications •  acetaminophen **OR** acetaminophen **OR** acetaminophen  •  acetaminophen **OR** acetaminophen **OR** acetaminophen  •  aluminum-magnesium hydroxide-simethicone  •  atorvastatin  •  diphenhydrAMINE **OR** diphenhydrAMINE  •  donepezil  •  magnesium sulfate **OR** magnesium sulfate in D5W 1g/100mL (PREMIX)  •  melatonin  •  nitroglycerin  •  ondansetron **OR** ondansetron  •  pantoprazole  •  potassium chloride  •  potassium chloride  •  [COMPLETED] Insert peripheral IV **AND** sodium chloride  •  [COMPLETED] Insert peripheral IV **AND** sodium chloride  •  sodium chloride  •  sodium chloride     Physical Findings        Trending Physical   Appearance, NFPE 10/13: Performed NFPE and consistent with malnutrition, see MSA.   --  Edema  No edema dodcumented     Bowel Function Last BM 10/12     Tubes No feeding tube     Chewing/Swallowing No issues per patient son     Skin Intact     --  Current Nutrition Orders & Evaluation of Intake       Oral Nutrition     Food Allergies NKFA   Current PO Diet Diet Liquids; Full Liquid   Supplement -None   PO Evaluation     Trending % PO Intake 10/13: 0% po intakes since admission   --  Nutritional Risk Screening        NRS-2002 Score   -       Nutrition Diagnosis         Nutrition Dx Problem 1 -Moderate chronic disease related malnutrition related to past medical history as evidenced by muscle and fat wasting as noted on physical exam.    Nutrition Dx Problem 2 -       Intervention Goal         Intervention Goal(s) -Intakes greater than 75% of estimated needs  -Advance diet as able per GI     Nutrition Intervention        RD Action -Add Vanilla Boost Plus TID     Nutrition Prescription          Diet Prescription -Full liquid   Supplement Prescription -Vanilla Boost Plus TID   --  Monitor/Evaluation         Monitor PO intake, Supplement intake, Weight     Malnutrition Severity Assessment      Patient meets criteria for : Moderate (non-severe) Malnutrition  Malnutrition Type (last 8 hours)     Malnutrition Severity Assessment     Row Name 10/13/21 1540       Malnutrition Severity Assessment    Malnutrition Type Chronic Disease - Related Malnutrition    Row Name 10/13/21 1540       Insufficient Energy Intake     Insufficient Energy Intake  <75% of est. energy requirement for > or equal to 1 month    Row Name 10/13/21 1540       Muscle Loss    Loss of Muscle Mass Findings Moderate    Buddhist Region Moderate - slight depression    Clavicle Bone Region Moderate - some protrusion in females, visible in males    Acromion Bone Region Moderate - acromion may slightly protrude    Scapular Bone Region Moderate - mild depression, bones may show slightly    Dorsal Hand Region Moderate - slight depression    Patellar Region Moderate - patella more prominent, less muscle definition around patella    Anterior Thigh Region Moderate - mild depression on inner thigh    Posterior Calf Region Moderate - some roundness, slight firmness    Row Name 10/13/21 1540       Fat Loss    Subcutaneous Fat Loss Findings Moderate    Orbital Region  Moderate -  somewhat hollowness, slightly dark circles    Upper Arm Region Moderate - some fat tissue, not ample    Thoracic & Lumbar Region Moderate - ribs visible with mild depressions, iliac crest somewhat prominent    Row Name 10/13/21 1544       Criteria Met (Must meet criteria for severity in at least 2 of these categories: M Wasting, Fat Loss, Fluid, Secondary Signs, Wt. Status, Intake)    Patient meets criteria for  Moderate (non-severe) Malnutrition                     Electronically signed by:  Marilyn Roa RD  10/13/21 15:06 EDT

## 2021-10-13 NOTE — CASE MANAGEMENT/SOCIAL WORK
Continued Stay Note  JEREMY Pierce     Patient Name: Flori Tubbs  MRN: [id number]  Today's Date: 10/13/2021    Admit Date: 10/11/2021     Discharge Plan     Row Name 10/13/21 1035       Plan    Plan Comments CM reached out to PT to work with patient. Patient will need precert. Per MD patient wants cobalt or [facility] if inpt rehab is needed.              Expected Discharge Date and Time     Expected Discharge Date Expected Discharge Time    Oct 14, 2021         Phone communication or documentation only - no physical contact with patient or family.      Dee Hua, RN

## 2021-10-13 NOTE — PLAN OF CARE
Goal Outcome Evaluation:  Plan of Care Reviewed With: patient        Progress: no change  Outcome Summary: Pt. is an 88 y/o female after fall at home w/ syncope. Pt. found ot have gastroinstetinal hemorrhage w/ acute blood loss. Pt. states she lives alone at baseline and maintains ADL independence w/ children living close by to provide IADL support. Pt. reuqires min A for functional transfers this date and increased assist for LB ADLs secondary global weakness w/ impacts to sitting/standing balance. Pt. not safe to d/c home alone at this time and will require IP rehab at d/c to address aforementioned deficits, will follow up w/ pt. 1-3x per week at [facility].

## 2021-10-14 LAB
BH BB BLOOD EXPIRATION DATE: NORMAL
BH BB BLOOD TYPE BARCODE: 6200
BH BB DISPENSE STATUS: NORMAL
BH BB PRODUCT CODE: NORMAL
BH BB UNIT NUMBER: NORMAL
CROSSMATCH INTERPRETATION: NORMAL
HCT VFR BLD AUTO: 19.2 % (ref 34–46.6)
HCT VFR BLD AUTO: 20.1 % (ref 34–46.6)
HGB BLD-MCNC: 6.7 G/DL (ref 12–15.9)
HGB BLD-MCNC: 7 G/DL (ref 12–15.9)
MAGNESIUM SERPL-MCNC: 1.7 MG/DL (ref 1.6–2.4)
POTASSIUM SERPL-SCNC: 3.9 MMOL/L (ref 3.5–5.2)
UNIT  ABO: NORMAL
UNIT  RH: NORMAL

## 2021-10-14 PROCEDURE — 97116 GAIT TRAINING THERAPY: CPT

## 2021-10-14 PROCEDURE — P9016 RBC LEUKOCYTES REDUCED: HCPCS

## 2021-10-14 PROCEDURE — 85014 HEMATOCRIT: CPT | Performed by: HOSPITALIST

## 2021-10-14 PROCEDURE — 86900 BLOOD TYPING SEROLOGIC ABO: CPT

## 2021-10-14 PROCEDURE — 84132 ASSAY OF SERUM POTASSIUM: CPT | Performed by: INTERNAL MEDICINE

## 2021-10-14 PROCEDURE — 83735 ASSAY OF MAGNESIUM: CPT | Performed by: INTERNAL MEDICINE

## 2021-10-14 PROCEDURE — 85018 HEMOGLOBIN: CPT | Performed by: HOSPITALIST

## 2021-10-14 PROCEDURE — 86923 COMPATIBILITY TEST ELECTRIC: CPT

## 2021-10-14 PROCEDURE — 99232 SBSQ HOSP IP/OBS MODERATE 35: CPT | Performed by: HOSPITALIST

## 2021-10-14 PROCEDURE — 36415 COLL VENOUS BLD VENIPUNCTURE: CPT | Performed by: INTERNAL MEDICINE

## 2021-10-14 PROCEDURE — 36430 TRANSFUSION BLD/BLD COMPNT: CPT

## 2021-10-14 RX ADMIN — Medication 10 ML: at 21:19

## 2021-10-14 RX ADMIN — ATORVASTATIN CALCIUM 10 MG: 10 TABLET, FILM COATED ORAL at 12:21

## 2021-10-14 RX ADMIN — PANTOPRAZOLE SODIUM 40 MG: 40 INJECTION, POWDER, FOR SOLUTION INTRAVENOUS at 08:27

## 2021-10-14 RX ADMIN — PANTOPRAZOLE SODIUM 40 MG: 40 INJECTION, POWDER, FOR SOLUTION INTRAVENOUS at 21:19

## 2021-10-14 RX ADMIN — DONEPEZIL HYDROCHLORIDE 10 MG: 5 TABLET, FILM COATED ORAL at 21:19

## 2021-10-14 NOTE — CASE MANAGEMENT/SOCIAL WORK
Continued Stay Note  JEREMY Pierce     Patient Name: Flori Tubbs  MRN: [id number]  Today's Date: 10/14/2021    Admit Date: 10/11/2021     Discharge Plan     Row Name 10/14/21 1701       Plan    Plan pending- family appealing precert denial to [facility] in rehab. Vs home w/ [facility]. IF home will need BSC    Plan Comments CM met with patient and patient daughter at bedside. patient now wanting home with [facility] and requests CM to send mass referral to see who could take her. CM then later received call from patient daughter stating that they wanted to appeal the precert denial. CM obtained info for family to call. [phone] option 4.                            Expected Discharge Date and Time     Expected Discharge Date Expected Discharge Time    Oct 15, 2021         Phone communication or documentation only - no physical contact with patient or family.      Dee Hua RN

## 2021-10-14 NOTE — PLAN OF CARE
Pt. To receive blood transfusion this day, HGB. 6.7. Going for EGD following transfusion.

## 2021-10-14 NOTE — CASE MANAGEMENT/SOCIAL WORK
Continued Stay Note  JEREMY Pierce     Patient Name: Flori Tubbs  MRN: [id number]  Today's Date: 10/14/2021    Admit Date: 10/11/2021     Discharge Plan     Row Name 10/14/21 1056       Plan    Plan anticipate DC to inpt rehab- accepted at [facility] pending precert. Precert started 10/14- pending. Will need PASRR.    Plan Comments CM received message from [facility] liaison stating that they can take her pending precert. precert started today. MD notified                            Expected Discharge Date and Time     Expected Discharge Date Expected Discharge Time    Oct 15, 2021         Phone communication or documentation only - no physical contact with patient or family.      Dee Hua RN

## 2021-10-14 NOTE — CASE MANAGEMENT/SOCIAL WORK
[facility]  [address] [place] [address]  [address] [place] [address]  Phone: [phone]                  Erniqueta Mccarthy   3/28/2017 2:00 PM   Office Visit    Description:  Female : 1975   Provider:  Sangita Phelan DO   Department:  [facility]           Reason for Visit     Consult                To Do List           Future Appointments        Provider Department Dept Phone    2017 11:00 AM MD Jd Saenz - [phone]    2017 9:40 AM YVONNE Jacobson [facility] [phone]      Goals (5 Years of Data)              Today    3/27/17    3/18/17    Blood Pressure <= 140/90   123/58  123/58  123/58    Exercise at least 150 minutes per week.           Take at least one BP reading per week at various times of the day           Weight < 96.956 kg (213 lb 12 oz)   93.8 kg (206 lb 12.7 oz)        Notes - Note created  3/17/2016  2:53 PM by Tracy Cotton, PharmD    Decrease weight by 5% to improve cardiovascular outcomes.         [facility]     [facility] On [facility] Nurse Care Line -  Assistance  Registered nurses in the [facility] provide clinical advisement, health education, appointment booking, and other advisory services.  Call for this free service at [phone].             Medications           Message regarding Medications     Verify the changes and/or additions to your medication regime listed below are the same as discussed with your clinician today.  If any of these changes or additions are incorrect, please notify your healthcare provider.        STOP taking these medications     rifAXIMin (XIFAXAN) 550 mg Tab Take 1 tablet (550 mg total) by mouth 3 (three) times daily.           Verify that the below list of medications is an accurate representation of the medications you are currently taking.  If none reported, the list may be blank. If incorrect, please contact your healthcare  Continued Stay Note   Stan     Patient Name: Flori Tubbs  MRN: [id number]  Today's Date: 10/14/2021    Admit Date: 10/11/2021     Discharge Plan     Row Name 10/14/21 1100       Plan    Plan Comments CM updated patient daughter betty and she is agreeable to plan    Row Name 10/14/21 1056       Plan    Plan anticipate DC to inpt rehab- accepted at [facility] pending precert. Precert started 10/14- pending. Will need PASRR.    Plan Comments CM received message from [facility] liaison stating that they can take her pending precert. precert started today. MD notified                   Expected Discharge Date and Time     Expected Discharge Date Expected Discharge Time    Oct 15, 2021         Phone communication or documentation only - no physical contact with patient or family.      Dee Hua RN     "provider. Carry this list with you in case of emergency.           Current Medications     atorvastatin (LIPITOR) 80 MG tablet Take 80 mg by mouth every evening.     azelastine (ASTELIN) 137 mcg (0.1 %) nasal spray     carvedilol (COREG) 25 MG tablet Take 1 tablet (25 mg total) by mouth 2 (two) times daily. .5-1 twice daily or as directed    chlorthalidone (HYGROTEN) 25 MG Tab Take a half tablet (12.5mg) daily    dicyclomine (BENTYL) 20 mg tablet Take 1 tablet (20 mg total) by mouth every 6 (six) hours.    diltiaZEM (CARDIZEM CD) 180 MG 24 hr capsule TAKE 2 CAPSULES (360 MG TOTAL) BY MOUTH ONCE DAILY.    gabapentin (NEURONTIN) 300 MG capsule Take 2 capsules (600 mg total) by mouth every evening.    hydrALAZINE (APRESOLINE) 100 MG tablet TAKE 1 TABLET (100 MG TOTAL) BY MOUTH EVERY 8 (EIGHT) HOURS.    icosapent ethyl (VASCEPA) 1 gram Cap Take 2 g by mouth 2 (two) times daily.    metronidazole (FLAGYL) 250 MG tablet Take 1 tablet (250 mg total) by mouth 3 (three) times daily.    multivitamin with minerals tablet Take 1 tablet by mouth once daily.    ONETOUCH DELICA LANCETS 30 gauge Misc 1 lancet by Misc.(Non-Drug; Combo Route) route 3 (three) times daily. Test blood glucose up to 3 times daily as needed.    ONETOUCH VERIO Strp 1 strip by Misc.(Non-Drug; Combo Route) route 3 (three) times daily. Test blood glucose up to 3 times daily as needed.    potassium gluconate 595 mg (99 mg) Tab Take 2 tablets by mouth once daily.    TRULICITY 1.5 mg/0.5 mL PnIj Inject 1.5 mg into the skin every 7 days.    valsartan (DIOVAN) 320 MG tablet TAKE 1 TABLET EVERY DAY    VITAMIN D2 50,000 unit capsule TAKE 1 CAPSULE TWICE WEEKLY           Clinical Reference Information           Your Vitals Were     BP Pulse Temp Height Weight BMI    123/58 68 98.3 °F (36.8 °C) 5' 3" (1.6 m) 93.8 kg (206 lb 12.7 oz) 36.63 kg/m2      Blood Pressure          Most Recent Value    BP  (!)  123/58      Allergies as of 3/28/2017     No Known Allergies    "   Immunizations Administered on Date of Encounter - 3/28/2017     None      Language Assistance Services     ATTENTION: Language assistance services are available, free of charge. Please call [phone].      ATENCIÓN: Si habrachel duckworth, tiene a baires disposición servicios gratuitos de asistencia lingüística. Llame al [phone].     CHÚ Ý: N?u b?n nói Ti?ng Vi?t, có các d?ch v? h? tr? ngôn ng? mi?n phí dành cho b?n. G?i s? [phone].         [facility] complies with applicable Federal civil rights laws and does not discriminate on the basis of race, color, national origin, age, disability, or sex.

## 2021-10-14 NOTE — PLAN OF CARE
Goal Outcome Evaluation:         Assessment: Flori Tubbs is stronger than at previous rx, but she is not quite back to her baseline level. However, feel she is safe to return to senior apartment w/ home health PT and aide at d/c. She presents with functional mobility impairments which indicate the need for skilled intervention. Tolerating session today without incident. Will continue to follow and progress as tolerated.     Plan/Recommendations:   Pt would benefit from Home with Home Health at discharge from facility and requires no DME at discharge.   Pt desires Home with Home Health at discharge. Pt cooperative; agreeable to therapeutic recommendations and plan of care.  Pt was also advised that she should only shower w/ someone present for the first few times she is home for increased safety. Pt agreeable to this.

## 2021-10-14 NOTE — PLAN OF CARE
Problem: Adult Inpatient Plan of Care  Goal: Plan of Care Review  Outcome: Ongoing, Progressing  Goal: Patient-Specific Goal (Individualized)  Outcome: Ongoing, Progressing  Goal: Absence of Hospital-Acquired Illness or Injury  Outcome: Ongoing, Progressing  Intervention: Identify and Manage Fall Risk  Recent Flowsheet Documentation  Taken 10/14/2021 0200 by Lolita Montano RN  Safety Promotion/Fall Prevention:   activity supervised   safety round/check completed   room organization consistent   nonskid shoes/slippers when out of bed   fall prevention program maintained   clutter free environment maintained   assistive device/personal items within reach  Taken 10/14/2021 0015 by Lolita Montano RN  Safety Promotion/Fall Prevention:   activity supervised   safety round/check completed   room organization consistent   nonskid shoes/slippers when out of bed   fall prevention program maintained   clutter free environment maintained   assistive device/personal items within reach  Taken 10/13/2021 2200 by Lolita Montano RN  Safety Promotion/Fall Prevention:   activity supervised   assistive device/personal items within reach   clutter free environment maintained   mobility aid in reach   nonskid shoes/slippers when out of bed   room organization consistent   safety round/check completed   toileting scheduled   fall prevention program maintained  Intervention: Prevent Skin Injury  Recent Flowsheet Documentation  Taken 10/13/2021 1918 by Lolita Montano RN  Skin Protection: tubing/devices free from skin contact  Intervention: Prevent Infection  Recent Flowsheet Documentation  Taken 10/14/2021 0200 by Lolita Montano RN  Infection Prevention:   single patient room provided   rest/sleep promoted   personal protective equipment utilized   hand hygiene promoted   environmental surveillance performed  Taken 10/14/2021 0015 by Lolita Montano RN  Infection Prevention:   single patient room provided   rest/sleep  promoted   environmental surveillance performed   hand hygiene promoted  Goal: Optimal Comfort and Wellbeing  Outcome: Ongoing, Progressing  Goal: Readiness for Transition of Care  Outcome: Ongoing, Progressing     Problem: Fall Injury Risk  Goal: Absence of Fall and Fall-Related Injury  Outcome: Ongoing, Progressing  Intervention: Identify and Manage Contributors to Fall Injury Risk  Recent Flowsheet Documentation  Taken 10/14/2021 0200 by Lolita Montano RN  Medication Review/Management: medications reviewed  Taken 10/14/2021 0015 by Lolita Montano RN  Medication Review/Management: medications reviewed  Taken 10/13/2021 2200 by Lolita Montano RN  Medication Review/Management: medications reviewed  Intervention: Promote Injury-Free Environment  Recent Flowsheet Documentation  Taken 10/14/2021 0200 by Lolita Montano RN  Safety Promotion/Fall Prevention:   activity supervised   safety round/check completed   room organization consistent   nonskid shoes/slippers when out of bed   fall prevention program maintained   clutter free environment maintained   assistive device/personal items within reach  Taken 10/14/2021 0015 by Lolita Montano RN  Safety Promotion/Fall Prevention:   activity supervised   safety round/check completed   room organization consistent   nonskid shoes/slippers when out of bed   fall prevention program maintained   clutter free environment maintained   assistive device/personal items within reach  Taken 10/13/2021 2200 by Lolita Montano RN  Safety Promotion/Fall Prevention:   activity supervised   assistive device/personal items within reach   clutter free environment maintained   mobility aid in reach   nonskid shoes/slippers when out of bed   room organization consistent   safety round/check completed   toileting scheduled   fall prevention program maintained     Problem: Skin Injury Risk Increased  Goal: Skin Health and Integrity  Outcome: Ongoing, Progressing  Intervention: Optimize  Skin Protection  Recent Flowsheet Documentation  Taken 10/14/2021 0015 by Lolita Montano RN  Pressure Reduction Techniques: frequent weight shift encouraged  Pressure Reduction Devices: positioning supports utilized  Taken 10/13/2021 1918 by Lolita Montano RN  Pressure Reduction Techniques:   heels elevated off bed   frequent weight shift encouraged  Pressure Reduction Devices:   positioning supports utilized   foam padding utilized  Skin Protection: tubing/devices free from skin contact     Problem: Malnutrition  Goal: Improved Nutritional Intake  Outcome: Ongoing, Progressing   Goal Outcome Evaluation:    Patient has been resting in bed this shift with encouragement to reposition every couple of hours. Vitals wnl.Patient gets up to BSC with assist x1, awaiting placement for inpatient rehab

## 2021-10-14 NOTE — PLAN OF CARE
Goal Outcome Evaluation:     Pt received 1 unit PRBC today. Will continue to monitor H&H's. Physical therapy recommending Home Health. Will continue to monitor.     Problem: Adult Inpatient Plan of Care  Goal: Plan of Care Review  Outcome: Ongoing, Progressing

## 2021-10-14 NOTE — THERAPY TREATMENT NOTE
Subjective: Pt agreeable to therapeutic plan of care. Just received one unit of blood per RN.  Pt/daughter agreeable to PT.     Objective:     Bed mobility - CGA  Transfers - Supervision w/ rw, on/off bsc; able to manage perineal care w/o assist.   Ambulation - 40 feet Supervision and with rolling walker; wide base of support; forward flexed position; slow pace.    Pain: 0 VAS  Education: Provided education on importance of mobility and skilled verbal / tactile cueing throughout intervention.     Assessment: Flori Tubbs is stronger than at previous rx, but she is not quite back to her baseline level. However, feel she is safe to return to senior apartment w/ home health PT and aide at d/c. She presents with functional mobility impairments which indicate the need for skilled intervention. Tolerating session today without incident. Will continue to follow and progress as tolerated.     Plan/Recommendations:   Pt would benefit from Home with Home Health at discharge from facility and requires no DME at discharge.   Pt desires Home with Home Health at discharge. Pt cooperative; agreeable to therapeutic recommendations and plan of care.  Pt was also advised that she should only shower w/ someone present for the first few times she is home for increased safety. Pt agreeable to this.     Basic Mobility 6-click:  Rollin = Total, A lot = 2, A little = 3; 4 = None  Supine>Sit:   1 = Total, A lot = 2, A little = 3; 4 = None   Sit>Stand with arms:  1 = Total, A lot = 2, A little = 3; 4 = None  Bed>Chair:   1 = Total, A lot = 2, A little = 3; 4 = None  Ambulate in room:  1 = Total, A lot = 2, A little = 3; 4 = None  3-5 Steps with railin = Total, A lot = 2, A little = 3; 4 = None  Score: 21    Modified [place]: N/A = No pre-op stroke/TIA    Post-Tx Position: Up in Chair, Alarms activated and Call light and personal items within reach  PPE: gloves, surgical mask, eyewear protection

## 2021-10-14 NOTE — CASE MANAGEMENT/SOCIAL WORK
Continued Stay Note  [facility]     Patient Name: Flori Tubbs  MRN: [id number]  Today's Date: 10/14/2021    Admit Date: 10/11/2021     Discharge Plan     Row Name 10/14/21 1206       Plan    Plan anticipate DC to inpt rehab- accepted at [facility] pending P2P. P2P to be completed by 4pm 10/14- Dr. Leroy following    Plan Comments CM received call that patients precert was denied and that P2P must be completed by 4pm today [phone] option 5. Dr. Leroy following    Row Name 10/14/21 1100       Plan    Plan Comments CM updated patient daughter betty and she is agreeable to plan    Row Name 10/14/21 1056       Plan    Plan anticipate DC to inpt rehab- accepted at [facility] pending precert. Precert started 10/14- pending. Will need PASRR.    Plan Comments CM received message from [facility] liaison stating that they can take her pending precert. precert started today. MD notified                       Expected Discharge Date and Time     Expected Discharge Date Expected Discharge Time    Oct 15, 2021         Phone communication or documentation only - no physical contact with patient or family.      Dee Hua, RN

## 2021-10-14 NOTE — PROGRESS NOTES
[facility] Medicine Services Daily Progress Note    Patient Name: Flori Tubbs  : 1932  MRN: [id number]  Primary Care Physician:  Umberto White MD  Date of admission: 10/11/2021      Subjective      Chief Complaint: Fainting spell      Patient Reports   10/12/2021: Patient denies specific complaints at this time.  She has had no further bloody stools per the bedside nurse and her daughter at the bedside.  Patient and her daughter were counseled and all questions were answered.  10/13/2021: The patient denies current complaints.  She reports no lightheadedness or fatigue.  She denies any bleeding per rectum.  There was no family at the bedside at the time of my visit.    Review of Systems   All other systems reviewed and are negative.       Objective      Vitals:   Temp:  [97.8 °F (36.6 °C)-98.6 °F (37 °C)] 98.3 °F (36.8 °C)  Heart Rate:  [] 88  Resp:  [16-18] 17  BP: ()/(41-56) 103/41    Physical Exam  Vitals and nursing note reviewed.   Constitutional:       General: She is not in acute distress.     Appearance: Normal appearance. She is normal weight. She is not ill-appearing, toxic-appearing or diaphoretic.   HENT:      Mouth/Throat:      Mouth: Mucous membranes are moist.   Eyes:      General: No scleral icterus.     Conjunctiva/sclera: Conjunctivae normal.   Cardiovascular:      Rate and Rhythm: Normal rate and regular rhythm.      Pulses: Normal pulses.      Heart sounds: Normal heart sounds.   Pulmonary:      Effort: Pulmonary effort is normal.      Breath sounds: Normal breath sounds.   Abdominal:      General: Abdomen is flat. Bowel sounds are normal. There is no distension.      Palpations: Abdomen is soft.      Tenderness: There is no abdominal tenderness.   Musculoskeletal:         General: No swelling or tenderness.      Right lower leg: No edema.      Left lower leg: No edema.   Skin:     General: Skin is warm and dry.   Neurological:      General: No  focal deficit present.      Mental Status: She is alert.   Psychiatric:         Mood and Affect: Mood normal.         Behavior: Behavior normal.     Exam unchanged from 10/13/2021.       Result Review    Result Review:  I have personally reviewed the results from the time of this admission to 10/14/2021 19:53 EDT and agree with these findings:  [x]  Laboratory  [x]  Microbiology  [x]  Radiology  [x]  EKG/Telemetry   [x]  Cardiology/Vascular   []  Pathology  [x]  Old records  []  Other:  Most notable findings include: COVID-19 test negative.  Hemoglobin dropped to 6.2 from 8.9 and subsequently improved 8.1 with 1 unit PRBC; hemoglobin later dropped to 6.7 on 10/14/2021 and another unit of blood was ordered.    Assessment/Plan      Brief Patient Summary:  Flori Tubbs is a 89 y.o. female with a history of dementia, CAD s/p stent placement, HTN, HLD, CVA, colon cancer, and COVID-19 (11/05/20) who presented to [facility] ED on 10/11/2021 complaining of fainting at home and then she noticed significant bright red blood per rectum.  She received 1 unit PRBC for hemoglobin of 6.6 with improvement in her hemoglobin however she continued to have bright red bleeding per rectum.  Colonoscopy 10/12/2021 showed diverticulosis, hemorrhoids and several small polyps that were not removed as well as bright red blood with no source of bleeding identified.    Current inpatient medications include:  acetaminophen, 650 mg, Oral, Once   Or  acetaminophen, 650 mg, Oral, Once   Or  acetaminophen, 650 mg, Rectal, Once  atorvastatin, 10 mg, Oral, Daily  diphenhydrAMINE, 25 mg, Oral, Once   Or  diphenhydrAMINE, 25 mg, Intravenous, Once  donepezil, 10 mg, Oral, Nightly  pantoprazole, 40 mg, Intravenous, Q12H  sodium chloride, 10 mL, Intravenous, Q12H             Active Hospital Problems:  Active Hospital Problems    Diagnosis    • **Gastrointestinal hemorrhage    • Syncope    • Acute blood loss anemia    • Memory impairment    •  History of CVA (cerebrovascular accident)    • Primary hypertension    • History of colon cancer    • Chronic coronary artery disease    • Dyslipidemia    • Glaucoma      Plan:     Gastrointestinal hemorrhage  Acute blood loss anemia  -hgb 8.4 on admission, compared to 13.0 on 09/26/21  -Hemoglobin dropped to 6.6 and the patient was given 2 units PRBC 10/11/2021 and 10/12/2021  -Hemoglobin improved but then dropped again to 6.7 and another unit of PRBC was given on 10/14/2021  -monitor serial H&H, transfuse for hgb <7.0 or hemodynamic instability  -PPI BID  -GI consulted and performed colonoscopy which showed diverticulosis, small polyps and hemorrhoids with some fresh blood in the colon but no signs of active bleeding  -hold aspirin     Syncope due to orthostatic hypotension secondary to GI bleed  -given 2 L IVF in ER  -serial troponin x3 negative  -continuous cardiac monitoring  -fall precautions     Chronic coronary artery disease / Dyslipidemia / Primary hypertension, chronic with relative hypotension  -hold aspirin d/t GIB  -hold antihypertensives d/t borderline hypotension  -continue statin  -monitor BP     History of colon cancer, s/p resection     Glaucoma  -continue home eye drops      History of CVA (cerebrovascular accident)  -hold aspirin, continue statin as above     Memory impairment  -continue donepezil     DVT prophylaxis:  Mechanical DVT prophylaxis orders are present.    CODE STATUS:    Code Status: CPR  Medical Interventions (Level of Support Prior to Arrest): Full      Disposition:  I expect patient to be discharged once inpatient rehab is arranged and hemoglobin is stable.    This patient has been examined wearing appropriate Personal Protective Equipment and discussed with hospital infection control department. 10/14/21      Electronically signed by Keisha Montes MD, 10/14/21, 19:53 EDT.  [facility] Hospitalist Team

## 2021-10-15 VITALS
SYSTOLIC BLOOD PRESSURE: 114 MMHG | HEART RATE: 81 BPM | RESPIRATION RATE: 14 BRPM | WEIGHT: 134.04 LBS | OXYGEN SATURATION: 97 % | DIASTOLIC BLOOD PRESSURE: 45 MMHG | TEMPERATURE: 98.2 F | BODY MASS INDEX: 21.54 KG/M2 | HEIGHT: 66 IN

## 2021-10-15 PROBLEM — E44.0 MODERATE MALNUTRITION (HCC): Status: ACTIVE | Noted: 2021-10-15

## 2021-10-15 PROBLEM — R55 SYNCOPE: Status: RESOLVED | Noted: 2021-10-11 | Resolved: 2021-10-15

## 2021-10-15 PROBLEM — K92.2 GASTROINTESTINAL HEMORRHAGE: Status: RESOLVED | Noted: 2021-10-11 | Resolved: 2021-10-15

## 2021-10-15 LAB
BH BB BLOOD EXPIRATION DATE: NORMAL
BH BB BLOOD TYPE BARCODE: 6200
BH BB DISPENSE STATUS: NORMAL
BH BB PRODUCT CODE: NORMAL
BH BB UNIT NUMBER: NORMAL
CROSSMATCH INTERPRETATION: NORMAL
HCT VFR BLD AUTO: 24.4 % (ref 34–46.6)
HCT VFR BLD AUTO: 30.9 % (ref 34–46.6)
HGB BLD-MCNC: 10.4 G/DL (ref 12–15.9)
HGB BLD-MCNC: 8.4 G/DL (ref 12–15.9)
MAGNESIUM SERPL-MCNC: 1.8 MG/DL (ref 1.6–2.4)
POTASSIUM SERPL-SCNC: 4.2 MMOL/L (ref 3.5–5.2)
UNIT  ABO: NORMAL
UNIT  RH: NORMAL

## 2021-10-15 PROCEDURE — 97530 THERAPEUTIC ACTIVITIES: CPT

## 2021-10-15 PROCEDURE — 85018 HEMOGLOBIN: CPT | Performed by: HOSPITALIST

## 2021-10-15 PROCEDURE — 83735 ASSAY OF MAGNESIUM: CPT | Performed by: INTERNAL MEDICINE

## 2021-10-15 PROCEDURE — 85014 HEMATOCRIT: CPT | Performed by: HOSPITALIST

## 2021-10-15 PROCEDURE — 99238 HOSP IP/OBS DSCHRG MGMT 30/<: CPT | Performed by: HOSPITALIST

## 2021-10-15 PROCEDURE — 84132 ASSAY OF SERUM POTASSIUM: CPT | Performed by: INTERNAL MEDICINE

## 2021-10-15 PROCEDURE — 36415 COLL VENOUS BLD VENIPUNCTURE: CPT | Performed by: INTERNAL MEDICINE

## 2021-10-15 RX ORDER — PANTOPRAZOLE SODIUM 40 MG/1
40 TABLET, DELAYED RELEASE ORAL
Start: 2021-10-15

## 2021-10-15 RX ORDER — ACETAMINOPHEN 325 MG/1
650 TABLET ORAL EVERY 4 HOURS PRN
Start: 2021-10-15

## 2021-10-15 RX ORDER — PANTOPRAZOLE SODIUM 40 MG/1
40 TABLET, DELAYED RELEASE ORAL
Status: DISCONTINUED | OUTPATIENT
Start: 2021-10-15 | End: 2021-10-15 | Stop reason: HOSPADM

## 2021-10-15 RX ORDER — ONDANSETRON 4 MG/1
4 TABLET, FILM COATED ORAL EVERY 6 HOURS PRN
Start: 2021-10-15

## 2021-10-15 RX ORDER — CHOLECALCIFEROL (VITAMIN D3) 125 MCG
5 CAPSULE ORAL NIGHTLY PRN
Start: 2021-10-15

## 2021-10-15 RX ADMIN — Medication 10 ML: at 08:49

## 2021-10-15 RX ADMIN — ATORVASTATIN CALCIUM 10 MG: 10 TABLET, FILM COATED ORAL at 08:49

## 2021-10-15 RX ADMIN — PANTOPRAZOLE SODIUM 40 MG: 40 INJECTION, POWDER, FOR SOLUTION INTRAVENOUS at 08:49

## 2021-10-15 NOTE — PLAN OF CARE
Goal Outcome Evaluation:              Outcome Summary: Patient received 1 unit of prbc 10/14. Hemoglobin is now 8.4 today.Anticipate discharge soon.

## 2021-10-15 NOTE — CASE MANAGEMENT/SOCIAL WORK
Continued Stay Note  [facility]     Patient Name: Flori Tubbs  MRN: [id number]  Today's Date: 10/15/2021    Admit Date: 10/11/2021     Discharge Plan     Row Name 10/15/21 1346       Plan    Plan anticipate DC to inpt rehab- 1)[facility] 2)[facility] 3)[facility]. Referrals sent 10/15- PENDING.    Plan Comments CM received call from patients daughter stating that she spoke to patients insurance company and they sent her a PAR list of facilities to choose from. Choices listed above. referrals sent.                    Expected Discharge Date and Time     Expected Discharge Date Expected Discharge Time    Oct 15, 2021         Phone communication or documentation only - no physical contact with patient or family.      Dee Hua RN

## 2021-10-15 NOTE — PROGRESS NOTES
Nutrition Services    Patient Name: Flori Tubbs  YOB: 1932  MRN: [id number]  Admission date: 10/11/2021    Comments:  -Magic Cups at lunch/dinner (Provides 580 kcals, 18 g protein if consumed)       PPE Documentation        PPE Worn By Provider Did not enter room this encounter   PPE Worn By Patient  N/A     PROGRESS NOTE      Encounter Information: Check on for PO intakes.  S/p colonoscopy revealed no active bleeding.  Anticipating D/C soon.       PO Diet: Diet Cardiac; Healthy Heart   PO Supplements: - Boost Plus TID   PO Intake:  -38% PO intakes since admission       Nutrition support orders: -    Nutrition support review: -       Labs (reviewed below): -Reviewed        GI Function:  -Last BM 10/12       Nutrition Intervention: RD to add Vanilla Magic Cups BID to supplement diet.       Results from last 7 days   Lab Units 10/15/21  0356 10/14/21  0049 10/13/21  0101 10/12/21  0456 10/12/21  0456 10/11/21  1120 10/11/21  1120   SODIUM mmol/L  --   --   --   --  146*  --  144   POTASSIUM mmol/L 4.2 3.9 4.0   < > 4.0   < > 4.7   CHLORIDE mmol/L  --   --   --   --  115*  --  111*   CO2 mmol/L  --   --   --   --  22.0  --  22.0   BUN mg/dL  --   --   --   --  24*  --  32*   CREATININE mg/dL  --   --   --   --  0.91  --  1.02*   CALCIUM mg/dL  --   --   --   --  9.0  --  8.8   BILIRUBIN mg/dL  --   --   --   --   --   --  0.3   ALK PHOS U/L  --   --   --   --   --   --  44   ALT (SGPT) U/L  --   --   --   --   --   --  10   AST (SGOT) U/L  --   --   --   --   --   --  13   GLUCOSE mg/dL  --   --   --   --  96  --  147*    < > = values in this interval not displayed.     Results from last 7 days   Lab Units 10/15/21  0356 10/14/21  0233 10/14/21  0049 10/13/21  1850 10/13/21  0101   MAGNESIUM mg/dL 1.8  --  1.7  --  1.9   HEMOGLOBIN g/dL 8.4*   < >  --    < > 7.8*   HEMATOCRIT % 24.4*   < >  --    < > 22.8*    < > = values in this interval not displayed.     COVID19   Date Value Ref Range Status    10/11/2021 Not Detected Not Detected - Ref. Range Final     Lab Results   Component Value Date    HGBA1C 5.3 06/23/2021       RD to follow up per protocol.    Electronically signed by:  Marilyn Roa RD  10/15/21 09:14 EDT

## 2021-10-15 NOTE — NURSING NOTE
Pt discharged to [facility]. Family provided transport.

## 2021-10-15 NOTE — CASE MANAGEMENT/SOCIAL WORK
Continued Stay Note   [facility]     Patient Name: Flori Tubbs  MRN: [id number]  Today's Date: 10/15/2021    Admit Date: 10/11/2021     Discharge Plan     Row Name 10/15/21 1533       Plan    Plan anticipate DC to [facility]- precert waived. Will need PASRR.    Plan Comments [facility]- no beds. [facility]- no beds. [facility] can take patient today. CM updated patient daughter and she is agreeable. CM updated pharm in epic. MD and bedside RN updated. anticipate DC today    Row Name 10/15/21 1346       Plan    Plan anticipate DC to inpt rehab- 1)[facility] 2)[facility] 3)[facility]. Referrals sent 10/15- PENDING.    Plan Comments CM received call from patients daughter stating that she spoke to patients insurance company and they sent her a PAR list of facilities to choose from. Choices listed above. referrals sent.                            Expected Discharge Date and Time     Expected Discharge Date Expected Discharge Time    Oct 15, 2021          Phone communication or documentation only - no physical contact with patient or family.      Dee Hua, RN

## 2021-10-15 NOTE — DISCHARGE SUMMARY
[facility]  DISCHARGE SUMMARY    Patient Name: Flori Tubbs  : 1932  MRN: [id number]    Date of Admission: 10/11/2021  Date of Discharge: 10/15/2021  Primary Care Physician: Umberto White MD      Presenting Problem:   Gastrointestinal hemorrhage [K92.2]  Lower GI bleed [K92.2]  Gastrointestinal hemorrhage, unspecified gastrointestinal hemorrhage type [K92.2]    Active and Resolved Hospital Problems:  Active Hospital Problems    Diagnosis POA   • Moderate malnutrition (CMS/HCC) [E44.0] Yes   • Acute blood loss anemia [D62] Yes   • Memory impairment [R41.3] Yes   • History of CVA (cerebrovascular accident) [Z86.73] Not Applicable   • Primary hypertension [I10] Yes   • History of colon cancer [Z85.038] Yes   • Chronic coronary artery disease [I25.10] Yes   • Dyslipidemia [E78.5] Yes   • Glaucoma [H40.9] Yes      Resolved Hospital Problems    Diagnosis POA   • **Gastrointestinal hemorrhage [K92.2] Yes     Priority: High   • Syncope [R55] Yes     Gastrointestinal hemorrhage  Acute blood loss anemia  -hgb 8.4 on admission, compared to 13.0 on 21  -Hemoglobin dropped to 6.6 and the patient was given 2 units PRBC 10/11/2021 and 10/12/2021  -Hemoglobin improved but then dropped again to 6.7 and another unit of PRBC was given on 10/14/2021  -GI consulted and performed colonoscopy which showed diverticulosis, small polyps and hemorrhoids with some fresh blood in the colon but no signs of active bleeding  -Hemoglobin 10.4 at the time of discharge  -hold aspirin and continue once daily PPI     Syncope due to orthostatic hypotension secondary to GI bleed  -given 2 L IVF in ER  -serial troponin x3 negative     Chronic coronary artery disease / Dyslipidemia / Primary hypertension, chronic with relative hypotension  -discontinue aspirin due to GI bleeding  -Patient is not on any antihypertensive medications  -continue statin     History of colon cancer, s/p  resection with no evidence of recurrence on colonoscopy     Glaucoma  -continue home eye drops      History of CVA (cerebrovascular accident)  -hold aspirin, continue statin as above     Memory impairment  -continue donepezil     Moderate chronic disease related malnutrition  -Encourage boost supplements 3 times a day and vanilla Magic cups twice a day    Hospital Course     Hospital Course:  Flori Tubbs is a 89 y.o. female  with a history of dementia, CAD s/p stent placement, HTN, HLD, CVA, colon cancer, and COVID-19 (11/05/20) who presented to [facility] ED on 10/11/2021 complaining of fainting at home and then she noticed significant bright red blood per rectum.    Colonoscopy 10/12/2021 showed diverticulosis, hemorrhoids and several small polyps that were not removed as well as bright red blood with no source of bleeding identified. Hemoglobin dropped to 6.6. The patient required 3 units of packed red blood cells with improvement of hemoglobin to 10.4. The patient is now felt to be stable for discharge to rehab.        Reasons For Change In Medications and Indications for New Medications:  Discontinue aspirin because of GI bleeding.    Day of Discharge     Vital Signs:  Temp:  [98.1 °F (36.7 °C)-98.6 °F (37 °C)] 98.2 °F (36.8 °C)  Heart Rate:  [78-94] 81  Resp:  [14-18] 14  BP: (103-143)/(41-60) 114/45    Physical Exam:  Physical Exam vital signs and nurses notes reviewed. The patient has loose skin and bony prominences suggestive of moderate subcutaneous fat and muscle loss. Well-developed well-nourished elderly female comfortable on room air in no acute distress sitting up in bed awake and alert; mucous membranes moist; sclerae anicteric;  lungs clear to auscultation bilaterally; CV regular rate and rhythm; abdomen soft nontender nondistended with active bowel sounds; extremities with no edema, cyanosis or calf tenderness; palpable pedal pulses bilaterally; no Orozco catheter.      Pertinent  and/or  Most Recent Results     LAB RESULTS:      Lab 10/15/21  1015 10/15/21  0356 10/14/21  0742 10/14/21  0233 10/13/21  1850 10/13/21  0101 10/13/21  0101 10/12/21  1828 10/12/21  0456 10/11/21  1525 10/11/21  1120   WBC  --   --   --   --   --   --  7.10  --  9.30  --  14.10*   HEMOGLOBIN 10.4* 8.4* 6.7* 7.0* 8.1*   < > 7.8*   < > 8.9*   < > 8.4*   HEMATOCRIT 30.9* 24.4* 19.2* 20.1* 24.0*   < > 22.8*   < > 26.3*   < > 25.3*   PLATELETS  --   --   --   --   --   --  136*  --  169  --  227   NEUTROS ABS  --   --   --   --   --   --  3.70  --  5.50  --  11.00*   LYMPHS ABS  --   --   --   --   --   --  2.50  --  2.80  --  2.20   MONOS ABS  --   --   --   --   --   --  0.70  --  0.70  --  0.50   EOS ABS  --   --   --   --   --   --  0.20  --  0.30  --  0.20   MCV  --   --   --   --   --   --  89.1  --  91.0  --  92.7   PROTIME  --   --   --   --   --   --   --   --   --   --  11.0   APTT  --   --   --   --   --   --   --   --   --   --  <20.0*    < > = values in this interval not displayed.         Lab 10/15/21  0356 10/14/21  0049 10/13/21  0101 10/12/21  0456 10/11/21  1120   SODIUM  --   --   --  146* 144   POTASSIUM 4.2 3.9 4.0 4.0 4.7   CHLORIDE  --   --   --  115* 111*   CO2  --   --   --  22.0 22.0   ANION GAP  --   --   --  9.0 11.0   BUN  --   --   --  24* 32*   CREATININE  --   --   --  0.91 1.02*   GLUCOSE  --   --   --  96 147*   CALCIUM  --   --   --  9.0 8.8   MAGNESIUM 1.8 1.7 1.9 2.0  --          Lab 10/11/21  1120   TOTAL PROTEIN 5.5*   ALBUMIN 3.30*   GLOBULIN 2.2   ALT (SGPT) 10   AST (SGOT) 13   BILIRUBIN 0.3   ALK PHOS 44         Lab 10/12/21  0456 10/11/21  2247 10/11/21  1120   TROPONIN T <0.010 <0.010 <0.010   PROTIME  --   --  11.0   INR  --   --  0.99             Lab 10/11/21  1120   ABO TYPING A   RH TYPING Positive   ANTIBODY SCREEN Negative         Brief Urine Lab Results  (Last result in the past 365 days)      Color   Clarity   Blood   Leuk Est   Nitrite   Protein   CREAT   Urine HCG         09/26/21 2352 Yellow   Hazy  Comment:  Result checked    Negative   Small (1+)   Negative   Negative               Microbiology Results (last 10 days)     Procedure Component Value - Date/Time    COVID PRE-OP / PRE-PROCEDURE SCREENING ORDER (NO ISOLATION) - Swab, Nasopharynx [[id number]]  (Normal) Collected: 10/11/21 2101    Lab Status: Final result Specimen: Swab from Nasopharynx Updated: 10/11/21 2158    Narrative:      The following orders were created for panel order COVID PRE-OP / PRE-PROCEDURE SCREENING ORDER (NO ISOLATION) - Swab, Nasopharynx.  Procedure                               Abnormality         Status                     ---------                               -----------         ------                     COVID-19,CEPHEID/DEBBIE/BD...[[id number]]  Normal              Final result                 Please view results for these tests on the individual orders.    COVID-19,CEPHEID/DEBBIE/BDMAX,COR/[facility]/PAD/CARRIE IN-HOUSE(OR EMERGENT/ADD-ON),NP SWAB IN TRANSPORT MEDIA 3-4 HR TAT, RT-PCR - Swab, Nasopharynx [[id number]]  (Normal) Collected: 10/11/21 2101    Lab Status: Final result Specimen: Swab from Nasopharynx Updated: 10/11/21 2158     COVID19 Not Detected    Narrative:      Fact sheet for providers: https://www.fda.gov/media/873161/download     Fact sheet for patients: https://www.fda.gov/media/544076/download  Fact sheet for providers: https://www.fda.gov/media/665335/download     Fact sheet for patients: https://www.fda.gov/media/294488/download                           Labs Pending at Discharge:      Procedures Performed  Procedure(s):  COLONOSCOPY         Consults:   Consults     Date and Time Order Name Status Description    10/11/2021  1:08 PM Gastroenterology (on-call MD unless specified) Completed     10/11/2021  1:08 PM Hospitalist (on-call MD unless specified) Completed             Discharge Details        Discharge Medications      New Medications      Instructions Start Date   acetaminophen 325 MG  tablet  Commonly known as: TYLENOL   650 mg, Oral, Every 4 Hours PRN      melatonin 5 MG tablet tablet   5 mg, Oral, Nightly PRN      ondansetron 4 MG tablet  Commonly known as: ZOFRAN   4 mg, Oral, Every 6 Hours PRN      pantoprazole 40 MG EC tablet  Commonly known as: PROTONIX   40 mg, Oral, Every Morning Before Breakfast         Continue These Medications      Instructions Start Date   atorvastatin 10 MG tablet  Commonly known as: LIPITOR   TAKE 1 TABLET EVERY DAY      donepezil 10 MG tablet  Commonly known as: Aricept   10 mg, Oral, Nightly      Lumigan 0.01 % ophthalmic drops  Generic drug: bimatoprost   1 drop, Both Eyes, Nightly      nitroglycerin 0.4 MG SL tablet  Commonly known as: Nitrostat   0.4 mg, Sublingual, Every 5 Minutes PRN, Take no more than 3 doses in 15 minutes.      TIMOLOL MALEATE OP   1 drop, Ophthalmic, 2 times daily, Dose unknown       Vitamin B-12 1000 MCG sublingual tablet   1,000 mcg, Sublingual, Daily      Vitamin D (Ergocalciferol) 50 MCG (2000 UT) capsule   2,000 Units, Oral, Daily         Stop These Medications    GNP Aspirin 81 MG EC tablet  Generic drug: aspirin            Allergies   Allergen Reactions   • Epinephrine Unknown (See Comments)   • Sulfamethoxazole-Trimethoprim Rash         Discharge Disposition:   Rehab Facility or Unit (DC - External)    Diet:  Hospital:  Diet Order   Procedures   • Diet Cardiac; Healthy Heart         Discharge Activity:   Activity Instructions     Activity as Tolerated              CODE STATUS:  Code Status and Medical Interventions:   Ordered at: 10/11/21 1430     Code Status:    CPR     Medical Interventions (Level of Support Prior to Arrest):    Full         Future Appointments   Date Time Provider Department Center   11/1/2021  3:15 PM Umberto White MD [facility]   11/17/2021  2:45 PM Seipel, Joseph F, MD [facility]   11/30/2021  2:00 PM Wander White MD [facility]   12/21/2021 10:40 AM Taurus Robles MD [facility]  NA [facility] NA   1/18/2022 11:15 AM Umberto White MD [facility]       Additional Instructions for the Follow-ups that You Need to Schedule     Call MD With Problems / Concerns   As directed      Instructions: Call [phone] or email [email or web address] for problems or concerns.    Order Comments: Instructions: Call [phone] or email [email or web address] for problems or concerns.          Discharge Follow-up with PCP   As directed       Currently Documented PCP:    Umberto White MD    PCP Phone Number:    [phone]     Follow Up Details: After released from rehab               Time spent on Discharge including face to face service: 25 minutes    This patient has been examined wearing appropriate Personal Protective Equipment and discussed with hospital infection control department. 10/15/21      Signature: Electronically signed by Keisha Montes MD, 10/15/21, 3:46 PM EDT.

## 2021-10-15 NOTE — THERAPY TREATMENT NOTE
Subjective: Pt. Is fearful of being home alone.    Objective:     Bed mobility - N/A or Not attempted. Pt. Up in chair.  Transfers - SBA and with rolling walker, vcs for safe hand placement c rwx. Use.  Ambulation - 30' x 2 feet SBA and with rolling walker, vcs for safety c turns and transitional moves. No lob, weakness present, unable to accept challenge away from midline. Activity tolerance improved.     Pain: 0 VAS  Education: Provided education on importance of mobility and skilled verbal / tactile cueing throughout intervention.     Assessment: Flori Tubbs presents with functional mobility impairments which indicate the need for skilled intervention. Presents c weakness et limited activity tolerance. Amb. is presenting safer, needs reinforced safety c hand placement et transfers, monitor for improvement/consistency. Pt. is unable to accept challenges away from midline creating difficulty c safely performing adls. Pt.'s living environment is independent and doesn't allow for assistance c adls and is unsafe for home alone.Tolerating session today without incident. Will continue to follow and progress as tolerated.     Plan/Recommendations:   Pt would benefit from Inpatient Rehabilitation placement at discharge from facility and requires no DME at discharge.   Pt desires Inpatient Rehabilitation placement at discharge. Pt. Is concerned for her safety going home alone, she was cooperative; agreeable to therapeutic recommendations and plan of care.     Basic Mobility 6-click:  Rollin = Total, A lot = 2, A little = 3; 4 = None  Supine>Sit:   1 = Total, A lot = 2, A little = 3; 4 = None   Sit>Stand with arms:  1 = Total, A lot = 2, A little = 3; 4 = None  Bed>Chair:   1 = Total, A lot = 2, A little = 3; 4 = None  Ambulate in room:  1 = Total, A lot = 2, A little = 3; 4 = None  3-5 Steps with railin = Total, A lot = 2, A little = 3; 4 = None  Score: 19    Modified [place]: 4 = Moderately severe  disability (Unable to attend to own bodily needs without assistance, and unable to walk unassisted) Will monitor amb for consistency.    Post-Tx Position: Up in Chair, Alarms activated and Call light and personal items within reach  PPE: gloves, surgical mask, eyewear protection

## 2021-10-15 NOTE — DISCHARGE PLACEMENT REQUEST
"Diego Sales (89 y.o. Female)             Date of Birth Social Security Number Address Home Phone MRN    06/02/1932  [facility] [place] [address] [phone] [id number]    [employer] Marital Status             Presbyterian        Admission Date Admission Type Admitting Provider Attending Provider Department, Room/Bed    10/11/21 Emergency Keisha Montes MD Hall, Kelli G, MD [facility], 2114/1    Discharge Date Discharge Disposition Discharge Destination                         Attending Provider: Keisha Montes MD    Allergies: Epinephrine, Sulfamethoxazole-trimethoprim    Isolation: None   Infection: None   Code Status: CPR   Advance Care Planning Activity    Ht: 167.6 cm (66\")   Wt: 60.8 kg (134 lb 0.6 oz)    Admission Cmt: None   Principal Problem: Gastrointestinal hemorrhage [K92.2]                 Active Insurance as of 10/11/2021     Primary Coverage     Payor Plan Insurance Group Employer/Plan Group    HUMANA MEDICARE REPLACEMENT HUMANA MEDICARE REPLACEMENT [id number]     Payor Plan Address Payor Plan Phone Number Payor Plan Fax Number Effective Dates    [address] [address] [phone]  1/1/2018 - None Entered    [facility] [id number]       Subscriber Name Subscriber Birth Date Member ID       DIEGO SALES 6/2/1932 [id number]                 Emergency Contacts      (Rel.) Home Phone Work Phone Mobile Phone    betty joseph (Daughter) -- -- [phone]    mila alamo (Son) -- -- [phone]              "

## 2021-10-18 NOTE — CASE MANAGEMENT/SOCIAL WORK
Case Management Discharge Note      Final Note: [facility]         Selected Continued Care - Discharged on 10/15/2021 Admission date: 10/11/2021 - Discharge disposition: Rehab Facility or Unit (DC - External)    Destination Coordination complete.    Service Provider Selected Services Address Phone Fax Patient Preferred    [facility]  Skilled Nursing [address] [place] [address] [phone] -- --           Final Discharge Disposition Code: 03 - skilled nursing facility (SNF)

## 2021-11-08 ENCOUNTER — READMISSION MANAGEMENT (OUTPATIENT)
Dept: CALL CENTER | Facility: HOSPITAL | Age: 86
End: 2021-11-08

## 2021-11-08 NOTE — OUTREACH NOTE
Prep Survey      Responses   [facility] facility patient discharged from? Non-BH   Is LACE score < 7 ? Non-BH Discharge   Emergency Room discharge w/ pulse ox? No   Eligibility [facility]   Date of Admission 10/15/21   Date of Discharge 11/08/21   Discharge diagnosis GI Bleed   Does the patient have one of the following disease processes/diagnoses(primary or secondary)? Other   Prep survey completed? Yes          Alicia Davis RN

## 2021-11-09 ENCOUNTER — TRANSITIONAL CARE MANAGEMENT TELEPHONE ENCOUNTER (OUTPATIENT)
Dept: CALL CENTER | Facility: HOSPITAL | Age: 86
End: 2021-11-09

## 2021-11-09 NOTE — OUTREACH NOTE
Call Center TCM Note      Responses   [facility] [place] patient discharged from? Non-   Does the patient have one of the following disease processes/diagnoses(primary or secondary)? Other   TCM attempt successful? Yes   Call start time 1215   Change in Health Status Moved to LTC/SNF/Hospice  [[facility] currently ]   Call end time 1218   Discharge diagnosis GI Bleed   Is patient permission given to speak with other caregiver? Yes   List who call center can speak with Erlinda daughter    Person spoke with today (if not patient) and relationship Erlinda daughter    TCM call completed? Yes          Stephanie Joshi RN    11/9/2021, 12:19 EST

## 2021-11-29 ENCOUNTER — TELEPHONE (OUTPATIENT)
Dept: FAMILY MEDICINE CLINIC | Facility: CLINIC | Age: 86
End: 2021-11-29

## 2021-11-29 NOTE — TELEPHONE ENCOUNTER
Caller: mila alamo    Relationship to patient: Emergency Contact    Best call back number: [phone]    Patient is needing: PATIENT'S SON CALLED AND SAID THAT HIS MOTHER IS LIVING AT [facility] RIGHT NOW, AND USING THE IN HOUSE DOCTOR    HE WOULD LIKE HER TO CONTINUE BEING A PATIENT OD DR. ALVARADO'S    HE DID CANCEL HER APPOINTMENT ON 01/18/21

## 2023-01-03 ENCOUNTER — OFFICE VISIT (OUTPATIENT)
Dept: ORTHOPEDIC SURGERY | Facility: CLINIC | Age: 88
End: 2023-01-03
Payer: MEDICARE

## 2023-01-03 VITALS — OXYGEN SATURATION: 96 % | HEART RATE: 71 BPM

## 2023-01-03 DIAGNOSIS — M17.0 BILATERAL PRIMARY OSTEOARTHRITIS OF KNEE: Primary | ICD-10-CM

## 2023-01-03 DIAGNOSIS — M25.561 ACUTE PAIN OF BOTH KNEES: ICD-10-CM

## 2023-01-03 DIAGNOSIS — M25.562 ACUTE PAIN OF BOTH KNEES: ICD-10-CM

## 2023-01-03 DIAGNOSIS — M25.561 CHRONIC PAIN OF BOTH KNEES: ICD-10-CM

## 2023-01-03 DIAGNOSIS — G89.29 CHRONIC PAIN OF BOTH KNEES: ICD-10-CM

## 2023-01-03 DIAGNOSIS — M25.562 CHRONIC PAIN OF BOTH KNEES: ICD-10-CM

## 2023-01-03 PROCEDURE — 99203 OFFICE O/P NEW LOW 30 MIN: CPT | Performed by: STUDENT IN AN ORGANIZED HEALTH CARE EDUCATION/TRAINING PROGRAM

## 2023-01-03 PROCEDURE — 20610 DRAIN/INJ JOINT/BURSA W/O US: CPT | Performed by: STUDENT IN AN ORGANIZED HEALTH CARE EDUCATION/TRAINING PROGRAM

## 2023-01-03 RX ORDER — RISPERIDONE 0.25 MG/1
0.25 TABLET ORAL
COMMUNITY

## 2023-01-03 RX ORDER — OMEPRAZOLE 20 MG/1
20 CAPSULE, DELAYED RELEASE ORAL DAILY
COMMUNITY

## 2023-01-03 RX ORDER — TRIAMCINOLONE ACETONIDE 40 MG/ML
40 INJECTION, SUSPENSION INTRA-ARTICULAR; INTRAMUSCULAR ONCE
Status: COMPLETED | OUTPATIENT
Start: 2023-01-03 | End: 2023-01-03

## 2023-01-03 RX ORDER — FLUTICASONE PROPIONATE 50 MCG
2 SPRAY, SUSPENSION (ML) NASAL DAILY
COMMUNITY

## 2023-01-03 RX ORDER — DOCUSATE SODIUM 100 MG/1
100 CAPSULE, LIQUID FILLED ORAL 2 TIMES DAILY
COMMUNITY

## 2023-01-03 RX ADMIN — TRIAMCINOLONE ACETONIDE 40 MG: 40 INJECTION, SUSPENSION INTRA-ARTICULAR; INTRAMUSCULAR at 11:31

## 2023-01-03 NOTE — PROGRESS NOTES
NEW VISIT    Patient: Flori Tubbs  ?  YOB: 1932    MRN: [id number]  ?  Chief Complaint   Patient presents with   • Left Knee - Initial Evaluation   • Right Knee - Initial Evaluation      ?  HPI: Patient is a pleasant 89-year-old female who presents today with her daughter for bilateral knee pain right greater than left.  She states she has diffuse anterior and medial knee pain of both knees, with her right being worse.  This is primarily at the worst when she is walking to the dining [place] at her extended care facility.  She denies any locking or catching.  She does have some painful popping around the kneecaps.  Denies any numbness or tingling.  She had 1 prior corticosteroid injection that was about 30 or 40 years ago, uncertain relief at that time.  He does do some physical therapy at her rehab facility.  No other treatment at this time.    Allergies:   Allergies   Allergen Reactions   • Epinephrine Unknown (See Comments)   • Sulfamethoxazole-Trimethoprim Rash       Past Medical History:   Diagnosis Date   • Arthritis    • CAD (coronary artery disease)    • Colon cancer (HCC)    • COVID-19 2020   • CVA (cerebral vascular accident) (HCC)    • Dementia (HCC)    • Difficulty walking    • Glaucoma 2012   • Hypertension    • Knee swelling    • Myocardial infarction (HCC)    • Palpitation    • TIA (transient ischemic attack)    • Weakness      Past Surgical History:   Procedure Laterality Date   • CATARACT EXTRACTION     •  SECTION     • COLON SURGERY  2018   • COLONOSCOPY N/A 10/12/2021    Procedure: COLONOSCOPY;  Surgeon: Tommy Kirkland MD;  Location: [facility] ENDOSCOPY;  Service: Gastroenterology;  Laterality: N/A;  diverticulosis, lower GI Bleed   • MASS EXCISION  colon     Social History     Occupational History   • Not on file   Tobacco Use   • Smoking status: Never   • Smokeless tobacco: Never   Vaping Use   • Vaping Use: Never used   Substance and Sexual Activity   •  Alcohol use: No   • Drug use: No   • Sexual activity: Not Currently     Partners: Male     Birth control/protection: Post-menopausal      Social History     Social History Narrative   • Not on file     Family History   Problem Relation Age of Onset   • Cancer Mother         thyroid, breast   • Cancer Father         lung, pancreas   • Cancer Sister         breast       Review of Systems  Constitutional: Negative.  Negative for fever.   Musculoskeletal: Positive for joint pain  Skin: Negative.  Negative for rash and wound.    Neurological: Negative for numbness.     No Height Documented This Encounter  There were no vitals filed for this visit.  There is no height or weight on file to calculate BMI.  Vitals:    01/03/23 1331   Pulse: 71   SpO2: 96%        Physical Exam  Constitutional: Patient is oriented to person, place, and time. Appears well-developed and well-nourished.   Head: Normocephalic and atraumatic.   Pulmonary/Chest: Effort normal.   Musculoskeletal:   See detailed exam below   Neurological: Alert and oriented to person, place, and time. No sensory deficit. Coordination normal.   Skin: Skin is warm and dry. Capillary refill takes less than 2 seconds. No rash noted. No erythema.     The bilateral knee is without obvious signs of acute bony deformity, quadriceps atrophy, swelling, erythema, ecchymosis or joint effusion. The patella is with mild tenderness. Apprehension is negative with medial and lateral glide. Patella crepitus is positive. Patella grind is positive. The bilateral medial joint lines are tender to palpation, right greater than left.  Mild lateral joint line tenderness.  Soft tissue including the distal hamstring tendons, pes anserine, quad tendon, patellar tendon, proximal gastroc tendon, distal IT band, and Gerdy's tubercle are nontender. Flexion & extension are full and symmetrical. Knee and hip strength is 4/5 and symmetrical. Varus & valgus stress, anterior drawer, Lisbeth's, and  posterior drawer are all negative. Gait is painful and tandem.    Diagnostics:  xrays obtained today     Bilateral Knee X-Ray  Indication: Pain    Views: AP, Lateral, and Boaz    Findings:  No fracture  No bony lesion  Joint space narrowing of all 3 compartments consistent with severe tricompartmental osteoarthritis        Assessment:  Diagnoses and all orders for this visit:    1. Bilateral primary osteoarthritis of knee (Primary)    2. Chronic pain of both knees  -     XR Knee 3 View Bilateral  -     triamcinolone acetonide (KENALOG-40) injection 40 mg        Knee Injection Procedure Note    Right knee injection was discussed with the patient in detail, including indication, risks, benefits, and alternatives. Verbal consent was given for the procedure. Injection was performed by physician.  Injection site was identified by physical examination and cleaned with Betadine and alcohol swabs. Prior to needle insertion, ethyl chloride spray was used for surface anesthesia. Sterile technique was used.  A 25-gauge, 1.5\" needle was directed to the joint from a(n) lateral and anterior approach. Injectate was passed into the joint space without difficulty. The needle was removed and a simple bandage was applied. The procedure was tolerated well without difficulty.    Injection mixture:  1% lidocaine without epinephrine: 4 mL  40 mg/mL triamcinolone acetonide: 1 mL      Plan    · Corticosteroid injection discussed   · Physical Therapy discussed   · Exercise options discussed and encouraged  · Discussed Jose knee brace   · Rest, ice, compression, and elevation (RICE) therapy  · Follow up as needed     Above diagnosis and plan was discussed with the patient in office today.  X-rays were obtained and reviewed with the patient which showed severe tricompartmental bilateral osteoarthritis.  Given her pain and difficulty with ambulation at her extended care facility we discussed different options to treat her osteoarthritis  today.  Different options discussed included activity modification, oral medications, bracing, physical therapy, and corticosteroid injection.  After discussion with the patient she elected to proceed with a corticosteroid injection in her right knee, which is more symptomatic today.  The injection was given as noted above and tolerated well without any immediate complications.  She also trialed a Jose knee brace in office today.  I did encourage her to continue with physical therapy and activity at her extended care facility.  She will follow-up as needed.    Date of encounter: 01/03/2023   Aldo Jerome DO    Electronically signed by Aldo Jerome DO, 01/03/23, 1:48 PM EST.

## 2023-01-06 ENCOUNTER — PATIENT ROUNDING (BHMG ONLY) (OUTPATIENT)
Dept: SPORTS MEDICINE | Facility: CLINIC | Age: 88
End: 2023-01-06
Payer: MEDICARE

## 2023-04-06 ENCOUNTER — TELEPHONE (OUTPATIENT)
Dept: ORTHOPEDIC SURGERY | Facility: CLINIC | Age: 88
End: 2023-04-06
Payer: MEDICARE

## 2023-04-06 NOTE — TELEPHONE ENCOUNTER
Caller: SANDI HILL    Relationship to patient: DAUGHTER    Best call back number: [phone]    Chief complaint: INJECTION BILATERAL KNEE    Type of visit: INJECTION BILATERAL KNEE    Requested date: AFTERNOON    If rescheduling, when is the original appointment: NA    Additional notes: NA

## 2023-04-11 ENCOUNTER — OFFICE VISIT (OUTPATIENT)
Dept: ORTHOPEDIC SURGERY | Facility: CLINIC | Age: 88
End: 2023-04-11
Payer: MEDICARE

## 2023-04-11 VITALS — BODY MASS INDEX: 21.63 KG/M2 | OXYGEN SATURATION: 96 % | WEIGHT: 134 LBS | HEART RATE: 109 BPM

## 2023-04-11 DIAGNOSIS — M17.0 BILATERAL PRIMARY OSTEOARTHRITIS OF KNEE: Primary | ICD-10-CM

## 2023-04-11 DIAGNOSIS — G89.29 CHRONIC PAIN OF BOTH KNEES: ICD-10-CM

## 2023-04-11 DIAGNOSIS — M25.561 CHRONIC PAIN OF BOTH KNEES: ICD-10-CM

## 2023-04-11 DIAGNOSIS — M25.562 CHRONIC PAIN OF BOTH KNEES: ICD-10-CM

## 2023-04-11 RX ADMIN — TRIAMCINOLONE ACETONIDE 40 MG: 40 INJECTION, SUSPENSION INTRA-ARTICULAR; INTRAMUSCULAR at 16:41

## 2023-04-11 RX ADMIN — LIDOCAINE HYDROCHLORIDE 4 ML: 10 INJECTION, SOLUTION EPIDURAL; INFILTRATION; INTRACAUDAL; PERINEURAL at 16:41

## 2023-04-11 NOTE — PROGRESS NOTES
Procedure   - Large Joint Arthrocentesis: bilateral knee on 4/11/2023 4:41 PM  Indications: pain  Details: 25 G needle, anteromedial approach  Medications (Right): 40 mg triamcinolone acetonide 40 MG/ML; 4 mL lidocaine PF 1% 1 %  Medications (Left): 40 mg triamcinolone acetonide 40 MG/ML; 4 mL lidocaine PF 1% 1 %  Outcome: tolerated well, no immediate complications  Procedure, treatment alternatives, risks and benefits explained, specific risks discussed. Consent was given by the patient and guardian. Immediately prior to procedure a time out was called to verify the correct patient, procedure, equipment, support staff and site/side marked as required. Patient was prepped and draped in the usual sterile fashion.

## 2023-04-11 NOTE — PROGRESS NOTES
NEW VISIT    Patient: Flori Tubbs  ?  YOB: 1932    MRN: [id number]  ?  Chief Complaint   Patient presents with   • Left Knee - Follow-up   • Right Knee - Follow-up      ?  HPI: Patient returning today for follow-up of bilateral knee pain.  Continues to have bilateral medial knee pain that affects her with walking at her extended care facility.  Did have improvement with both pain and walking with prior right knee corticosteroid injection.  Partial history through son who is present in office today given patient's dementia.  She has recently been moved to the memory care unit at her extended care facility, and seems to be favoring the knees with her walking less frequently.  As such she is returning today for repeat bilateral corticosteroid injections for pain relief.    Allergies:   Allergies   Allergen Reactions   • Epinephrine Unknown (See Comments)   • Sulfamethoxazole-Trimethoprim Rash       Past Medical History:   Diagnosis Date   • Arthritis    • CAD (coronary artery disease)    • Colon cancer    • COVID-19 2020   • CVA (cerebral vascular accident)    • Dementia    • Difficulty walking    • Glaucoma 2012   • Hypertension    • Knee swelling    • Myocardial infarction    • Palpitation    • TIA (transient ischemic attack)    • Weakness      Past Surgical History:   Procedure Laterality Date   • CATARACT EXTRACTION     •  SECTION     • COLON SURGERY  2018   • COLONOSCOPY N/A 10/12/2021    Procedure: COLONOSCOPY;  Surgeon: Tommy Kirkland MD;  Location: [facility];  Service: Gastroenterology;  Laterality: N/A;  diverticulosis, lower GI Bleed   • MASS EXCISION  colon     Social History     Occupational History   • Not on file   Tobacco Use   • Smoking status: Never   • Smokeless tobacco: Never   Vaping Use   • Vaping Use: Never used   Substance and Sexual Activity   • Alcohol use: No   • Drug use: No   • Sexual activity: Not Currently     Partners: Male     Birth  control/protection: Post-menopausal      Social History     Social History Narrative   • Not on file     Family History   Problem Relation Age of Onset   • Cancer Mother         thyroid, breast   • Cancer Father         lung, pancreas   • Cancer Sister         breast       Review of Systems  Constitutional: Negative.  Negative for fever.   Musculoskeletal: Positive for joint pain  Skin: Negative.  Negative for rash and wound.    Neurological: Negative for numbness.     No Height Documented This Encounter      04/11/23  1626   Weight: 60.8 kg (134 lb)     Body mass index is 21.63 kg/m².  Vitals:    04/11/23 1626   Pulse: 109   SpO2: 96%   Weight: 60.8 kg (134 lb)        Physical Exam  Constitutional: Patient is oriented to person, and place. Appears well-developed and well-nourished.   Head: Normocephalic and atraumatic.   Pulmonary/Chest: Effort normal.   Musculoskeletal:   See detailed exam below   Neurological: Alert and oriented to person, place, and time. No sensory deficit. Coordination normal.   Skin: Skin is warm and dry. Capillary refill takes less than 2 seconds. No rash noted. No erythema.     The bilateral knee is without obvious signs of acute bony deformity, quadriceps atrophy, swelling, erythema, ecchymosis or joint effusion. The patella is with mild tenderness. Apprehension is negative with medial and lateral glide. Patella crepitus is positive. Patella grind is positive. The bilateral medial joint lines are tender to palpation, right greater than left.  Mild lateral joint line tenderness.  Soft tissue including the distal hamstring tendons, pes anserine, quad tendon, patellar tendon, proximal gastroc tendon, distal IT band, and Gerdy's tubercle are nontender. Flexion & extension are full and symmetrical. Knee and hip strength is 4-/5 and symmetrical. Varus & valgus stress, anterior drawer, Lisbeth's, and posterior drawer are all negative.  Present in wheelchair on exam today.        Diagnostics:  No  new imaging       Assessment:  Diagnoses and all orders for this visit:    1. Bilateral primary osteoarthritis of knee (Primary)  -     - Large Joint Arthrocentesis: bilateral knee    2. Chronic pain of both knees  -     - Large Joint Arthrocentesis: bilateral knee          Plan    · Repeat lateral corticosteroid injection given as noted above without complications for bilateral knee osteoarthritis  · Encouraged physical therapy through [facility] care facility, as well as daily gentle range of motion and walking as tolerated  · Compression knee sleeve as needed  · Rest, ice, compression, and elevation (RICE) therapy  · Follow up as needed for repeat injections    Date of encounter: 01/03/2023   Aldo Jerome DO    Electronically signed by Aldo Jerome DO, 01/03/23, 1:48 PM EST.

## 2023-04-12 RX ORDER — LIDOCAINE HYDROCHLORIDE 10 MG/ML
4 INJECTION, SOLUTION EPIDURAL; INFILTRATION; INTRACAUDAL; PERINEURAL
Status: COMPLETED | OUTPATIENT
Start: 2023-04-11 | End: 2023-04-11

## 2023-04-12 RX ORDER — TRIAMCINOLONE ACETONIDE 40 MG/ML
40 INJECTION, SUSPENSION INTRA-ARTICULAR; INTRAMUSCULAR
Status: COMPLETED | OUTPATIENT
Start: 2023-04-11 | End: 2023-04-11

## 2023-05-26 NOTE — TELEPHONE ENCOUNTER
[facility] IN [place].   
Pt's daughter in law Priti called [facility], pt needs 1 week rx refill sent to [facility] [place] [facility]  While waiting on mail order refill.     Called back spoke to pt's son Sherman [phone]  Med.  Metoprolol Succ 25mg daily     LOV June , has June 2021 follow up   Labs in July  
[Normal] : soft, non-tender, non-distended, no masses palpated, no HSM and normal bowel sounds

## 2023-07-12 ENCOUNTER — APPOINTMENT (OUTPATIENT)
Dept: CT IMAGING | Facility: HOSPITAL | Age: 88
DRG: 843 | End: 2023-07-12
Payer: MEDICARE

## 2023-07-12 ENCOUNTER — HOSPITAL ENCOUNTER (INPATIENT)
Facility: HOSPITAL | Age: 88
LOS: 4 days | Discharge: HOSPICE | DRG: 843 | End: 2023-07-18
Attending: EMERGENCY MEDICINE | Admitting: STUDENT IN AN ORGANIZED HEALTH CARE EDUCATION/TRAINING PROGRAM
Payer: MEDICARE

## 2023-07-12 ENCOUNTER — APPOINTMENT (OUTPATIENT)
Dept: GENERAL RADIOLOGY | Facility: HOSPITAL | Age: 88
DRG: 843 | End: 2023-07-12
Payer: MEDICARE

## 2023-07-12 DIAGNOSIS — S02.2XXA CLOSED FRACTURE OF NASAL BONE, INITIAL ENCOUNTER: ICD-10-CM
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
## 2023-07-12 DIAGNOSIS — R93.89 ABNORMAL CT SCAN, NECK: ICD-10-CM

## 2023-07-12 PROBLEM — R93.7 ABNORMAL CT SCAN, CERVICAL SPINE: Status: ACTIVE | Noted: 2023-07-12

## 2023-07-12 LAB
ALBUMIN SERPL-MCNC: 4.1 G/DL (ref 3.5–5.2)
ALBUMIN/GLOB SERPL: 1.4 G/DL
ALP SERPL-CCNC: 75 U/L (ref 39–117)
ALT SERPL W P-5'-P-CCNC: 12 U/L (ref 1–33)
ANION GAP SERPL CALCULATED.3IONS-SCNC: 10 MMOL/L (ref 5–15)
AST SERPL-CCNC: 14 U/L (ref 1–32)
BASOPHILS # BLD AUTO: 0.1 10*3/MM3 (ref 0–0.2)
BASOPHILS NFR BLD AUTO: 0.6 % (ref 0–1.5)
BILIRUB SERPL-MCNC: 0.7 MG/DL (ref 0–1.2)
BUN SERPL-MCNC: 20 MG/DL (ref 8–23)
BUN/CREAT SERPL: 21.3 (ref 7–25)
CA-I SERPL ISE-MCNC: 1.33 MMOL/L (ref 1.2–1.3)
CALCIUM SPEC-SCNC: 10.2 MG/DL (ref 8.2–9.6)
CEA SERPL-MCNC: 1.81 NG/ML
CHLORIDE SERPL-SCNC: 105 MMOL/L (ref 98–107)
CO2 SERPL-SCNC: 27 MMOL/L (ref 22–29)
CREAT SERPL-MCNC: 0.94 MG/DL (ref 0.57–1)
DEPRECATED RDW RBC AUTO: 44.6 FL (ref 37–54)
EGFRCR SERPLBLD CKD-EPI 2021: 57.4 ML/MIN/1.73
EOSINOPHIL # BLD AUTO: 0.5 10*3/MM3 (ref 0–0.4)
EOSINOPHIL NFR BLD AUTO: 5.7 % (ref 0.3–6.2)
ERYTHROCYTE [DISTWIDTH] IN BLOOD BY AUTOMATED COUNT: 13.5 % (ref 12.3–15.4)
GLOBULIN UR ELPH-MCNC: 2.9 GM/DL
GLUCOSE SERPL-MCNC: 90 MG/DL (ref 65–99)
HCT VFR BLD AUTO: 41.6 % (ref 34–46.6)
HGB BLD-MCNC: 13.7 G/DL (ref 12–15.9)
LYMPHOCYTES # BLD AUTO: 1.5 10*3/MM3 (ref 0.7–3.1)
LYMPHOCYTES NFR BLD AUTO: 16.5 % (ref 19.6–45.3)
MCH RBC QN AUTO: 31.2 PG (ref 26.6–33)
MCHC RBC AUTO-ENTMCNC: 32.9 G/DL (ref 31.5–35.7)
MCV RBC AUTO: 94.9 FL (ref 79–97)
MONOCYTES # BLD AUTO: 0.6 10*3/MM3 (ref 0.1–0.9)
MONOCYTES NFR BLD AUTO: 6.9 % (ref 5–12)
NEUTROPHILS NFR BLD AUTO: 6.3 10*3/MM3 (ref 1.7–7)
NEUTROPHILS NFR BLD AUTO: 70.3 % (ref 42.7–76)
NRBC BLD AUTO-RTO: 0 /100 WBC (ref 0–0.2)
PLATELET # BLD AUTO: 241 10*3/MM3 (ref 140–450)
PMV BLD AUTO: 8.2 FL (ref 6–12)
POTASSIUM SERPL-SCNC: 4 MMOL/L (ref 3.5–5.2)
PROT SERPL-MCNC: 7 G/DL (ref 6–8.5)
RBC # BLD AUTO: 4.38 10*6/MM3 (ref 3.77–5.28)
SODIUM SERPL-SCNC: 142 MMOL/L (ref 136–145)
WBC NRBC COR # BLD: 8.9 10*3/MM3 (ref 3.4–10.8)

## 2023-07-12 PROCEDURE — 73502 X-RAY EXAM HIP UNI 2-3 VIEWS: CPT

## 2023-07-12 PROCEDURE — 85025 COMPLETE CBC W/AUTO DIFF WBC: CPT | Performed by: NURSE PRACTITIONER

## 2023-07-12 PROCEDURE — 80053 COMPREHEN METABOLIC PANEL: CPT | Performed by: NURSE PRACTITIONER

## 2023-07-12 PROCEDURE — G0378 HOSPITAL OBSERVATION PER HR: HCPCS

## 2023-07-12 PROCEDURE — 70450 CT HEAD/BRAIN W/O DYE: CPT

## 2023-07-12 PROCEDURE — 82330 ASSAY OF CALCIUM: CPT | Performed by: NURSE PRACTITIONER

## 2023-07-12 PROCEDURE — 83521 IG LIGHT CHAINS FREE EACH: CPT | Performed by: NURSE PRACTITIONER

## 2023-07-12 PROCEDURE — 84165 PROTEIN E-PHORESIS SERUM: CPT | Performed by: NURSE PRACTITIONER

## 2023-07-12 PROCEDURE — 36415 COLL VENOUS BLD VENIPUNCTURE: CPT | Performed by: NURSE PRACTITIONER

## 2023-07-12 PROCEDURE — 99285 EMERGENCY DEPT VISIT HI MDM: CPT

## 2023-07-12 PROCEDURE — 72125 CT NECK SPINE W/O DYE: CPT

## 2023-07-12 PROCEDURE — 70486 CT MAXILLOFACIAL W/O DYE: CPT

## 2023-07-12 PROCEDURE — 97162 PT EVAL MOD COMPLEX 30 MIN: CPT

## 2023-07-12 PROCEDURE — 82378 CARCINOEMBRYONIC ANTIGEN: CPT | Performed by: NURSE PRACTITIONER

## 2023-07-12 RX ORDER — ACETAMINOPHEN 650 MG/1
650 SUPPOSITORY RECTAL EVERY 4 HOURS PRN
Status: DISCONTINUED | OUTPATIENT
Start: 2023-07-12 | End: 2023-07-18 | Stop reason: HOSPADM

## 2023-07-12 RX ORDER — ACETAMINOPHEN 160 MG/5ML
650 SOLUTION ORAL EVERY 4 HOURS PRN
Status: DISCONTINUED | OUTPATIENT
Start: 2023-07-12 | End: 2023-07-18 | Stop reason: HOSPADM

## 2023-07-12 RX ORDER — RISPERIDONE 0.25 MG/1
0.25 TABLET ORAL NIGHTLY
Status: DISCONTINUED | OUTPATIENT
Start: 2023-07-12 | End: 2023-07-18 | Stop reason: HOSPADM

## 2023-07-12 RX ORDER — ACETAMINOPHEN 500 MG
500 TABLET ORAL 2 TIMES DAILY PRN
Status: DISCONTINUED | OUTPATIENT
Start: 2023-07-12 | End: 2023-07-12 | Stop reason: SDUPTHER

## 2023-07-12 RX ORDER — ONDANSETRON 2 MG/ML
4 INJECTION INTRAMUSCULAR; INTRAVENOUS EVERY 6 HOURS PRN
Status: DISCONTINUED | OUTPATIENT
Start: 2023-07-12 | End: 2023-07-18 | Stop reason: HOSPADM

## 2023-07-12 RX ORDER — LORAZEPAM 0.5 MG/1
0.5 TABLET ORAL ONCE
Status: COMPLETED | OUTPATIENT
Start: 2023-07-12 | End: 2023-07-12

## 2023-07-12 RX ORDER — TRAMADOL HYDROCHLORIDE 50 MG/1
50 TABLET ORAL EVERY 6 HOURS PRN
COMMUNITY

## 2023-07-12 RX ORDER — POLYETHYLENE GLYCOL 3350 17 G/17G
17 POWDER, FOR SOLUTION ORAL DAILY PRN
Status: DISCONTINUED | OUTPATIENT
Start: 2023-07-12 | End: 2023-07-18 | Stop reason: HOSPADM

## 2023-07-12 RX ORDER — AMOXICILLIN 250 MG
2 CAPSULE ORAL 2 TIMES DAILY
Status: DISCONTINUED | OUTPATIENT
Start: 2023-07-12 | End: 2023-07-18 | Stop reason: HOSPADM

## 2023-07-12 RX ORDER — DIAPER,BRIEF,INFANT-TODD,DISP
1 EACH MISCELLANEOUS 2 TIMES DAILY PRN
COMMUNITY

## 2023-07-12 RX ORDER — BISACODYL 5 MG/1
5 TABLET, DELAYED RELEASE ORAL DAILY PRN
Status: DISCONTINUED | OUTPATIENT
Start: 2023-07-12 | End: 2023-07-18 | Stop reason: HOSPADM

## 2023-07-12 RX ORDER — LORAZEPAM 0.5 MG/1
0.25 TABLET ORAL EVERY 8 HOURS PRN
Status: DISCONTINUED | OUTPATIENT
Start: 2023-07-12 | End: 2023-07-18 | Stop reason: HOSPADM

## 2023-07-12 RX ORDER — TRAMADOL HYDROCHLORIDE 50 MG/1
50 TABLET ORAL EVERY 6 HOURS PRN
Status: DISCONTINUED | OUTPATIENT
Start: 2023-07-12 | End: 2023-07-18 | Stop reason: HOSPADM

## 2023-07-12 RX ORDER — ONDANSETRON 4 MG/1
4 TABLET, FILM COATED ORAL EVERY 6 HOURS PRN
Status: DISCONTINUED | OUTPATIENT
Start: 2023-07-12 | End: 2023-07-12 | Stop reason: SDUPTHER

## 2023-07-12 RX ORDER — ACETAMINOPHEN 500 MG
500 TABLET ORAL 2 TIMES DAILY
COMMUNITY

## 2023-07-12 RX ORDER — ACETAMINOPHEN 500 MG
500 TABLET ORAL 2 TIMES DAILY PRN
COMMUNITY

## 2023-07-12 RX ORDER — SODIUM CHLORIDE 0.9 % (FLUSH) 0.9 %
10 SYRINGE (ML) INJECTION AS NEEDED
Status: DISCONTINUED | OUTPATIENT
Start: 2023-07-12 | End: 2023-07-18 | Stop reason: HOSPADM

## 2023-07-12 RX ORDER — ONDANSETRON 4 MG/1
4 TABLET, FILM COATED ORAL EVERY 6 HOURS PRN
Status: DISCONTINUED | OUTPATIENT
Start: 2023-07-12 | End: 2023-07-18 | Stop reason: HOSPADM

## 2023-07-12 RX ORDER — NITROGLYCERIN 0.4 MG/1
0.4 TABLET SUBLINGUAL
Status: DISCONTINUED | OUTPATIENT
Start: 2023-07-12 | End: 2023-07-18 | Stop reason: HOSPADM

## 2023-07-12 RX ORDER — BISACODYL 10 MG
10 SUPPOSITORY, RECTAL RECTAL DAILY PRN
Status: DISCONTINUED | OUTPATIENT
Start: 2023-07-12 | End: 2023-07-18 | Stop reason: HOSPADM

## 2023-07-12 RX ORDER — ACETAMINOPHEN 325 MG/1
650 TABLET ORAL EVERY 4 HOURS PRN
Status: DISCONTINUED | OUTPATIENT
Start: 2023-07-12 | End: 2023-07-18 | Stop reason: HOSPADM

## 2023-07-12 RX ORDER — ALUMINA, MAGNESIA, AND SIMETHICONE 2400; 2400; 240 MG/30ML; MG/30ML; MG/30ML
15 SUSPENSION ORAL EVERY 6 HOURS PRN
Status: DISCONTINUED | OUTPATIENT
Start: 2023-07-12 | End: 2023-07-18 | Stop reason: HOSPADM

## 2023-07-12 RX ORDER — GLIMEPIRIDE 2 MG/1
1 TABLET ORAL DAILY
Status: DISCONTINUED | OUTPATIENT
Start: 2023-07-12 | End: 2023-07-18 | Stop reason: HOSPADM

## 2023-07-12 RX ORDER — LORAZEPAM 0.5 MG/1
0.25 TABLET ORAL EVERY 8 HOURS PRN
COMMUNITY

## 2023-07-12 RX ORDER — LATANOPROST 50 UG/ML
1 SOLUTION/ DROPS OPHTHALMIC NIGHTLY
Status: DISCONTINUED | OUTPATIENT
Start: 2023-07-12 | End: 2023-07-18 | Stop reason: HOSPADM

## 2023-07-12 RX ORDER — DOCUSATE SODIUM 100 MG/1
100 CAPSULE, LIQUID FILLED ORAL 2 TIMES DAILY
Status: DISCONTINUED | OUTPATIENT
Start: 2023-07-12 | End: 2023-07-18 | Stop reason: HOSPADM

## 2023-07-12 RX ORDER — SODIUM CHLORIDE 0.9 % (FLUSH) 0.9 %
10 SYRINGE (ML) INJECTION EVERY 12 HOURS SCHEDULED
Status: DISCONTINUED | OUTPATIENT
Start: 2023-07-12 | End: 2023-07-18 | Stop reason: HOSPADM

## 2023-07-12 RX ORDER — ONDANSETRON 4 MG/1
4 TABLET, FILM COATED ORAL EVERY 6 HOURS PRN
COMMUNITY

## 2023-07-12 RX ORDER — SODIUM CHLORIDE 9 MG/ML
40 INJECTION, SOLUTION INTRAVENOUS AS NEEDED
Status: DISCONTINUED | OUTPATIENT
Start: 2023-07-12 | End: 2023-07-18 | Stop reason: HOSPADM

## 2023-07-12 RX ORDER — LORATADINE 10 MG/1
10 TABLET ORAL DAILY PRN
COMMUNITY

## 2023-07-12 RX ORDER — NITROGLYCERIN 0.4 MG/1
0.4 TABLET SUBLINGUAL
COMMUNITY

## 2023-07-12 RX ADMIN — DOCUSATE SODIUM 100 MG: 100 CAPSULE, LIQUID FILLED ORAL at 21:08

## 2023-07-12 RX ADMIN — SENNOSIDES AND DOCUSATE SODIUM 2 TABLET: 50; 8.6 TABLET ORAL at 21:08

## 2023-07-12 RX ADMIN — Medication 10 ML: at 21:07

## 2023-07-12 RX ADMIN — TIMOLOL MALEATE 1 DROP: 2.5 SOLUTION/ DROPS OPHTHALMIC at 11:16

## 2023-07-12 RX ADMIN — LORAZEPAM 0.5 MG: 0.5 TABLET ORAL at 05:11

## 2023-07-12 RX ADMIN — Medication 10 ML: at 09:36

## 2023-07-12 RX ADMIN — RISPERIDONE 0.25 MG: 0.25 TABLET, FILM COATED ORAL at 21:07

## 2023-07-12 NOTE — PROGRESS NOTES
A 89-year-old female with past medical history of CAD, hypertension, previous colon cancer with colon resection, CVA, dementia presented to the hospital after a fall.  Found to have small right frontal scalp hematoma, CT head with no acute bleed.  Facial CT with minimally displaced bilateral nasal bone fractures.  CT C-spine showed diffuse heterogeneous lucencies throughout the cervical and upper thoracic spine, suspicious for metastatic disease or multiple myeloma.  Oncology was consulted.    Patient was sleeping and resting comfortably at the time of my assessment.  Discussed with family at the bedside.  Apparently, patient has been on outpatient hospice.  Await oncology plan.    Dr. Jaime Wright MD MPH  Hospitalist  07/12/23   12:06 EDT

## 2023-07-12 NOTE — NURSING NOTE
Pt is being transported to the floor room 363. VS are stable, chest rise and fall are noted. Care continues with KASI Griggs.

## 2023-07-12 NOTE — NURSING NOTE
Ct juice given to pt and reviewed ct scan in detail with pt and family, will notify ct when pt finishes drink, per ct there is not set time limit to finish juice

## 2023-07-12 NOTE — H&P
[facility] Medicine Services  History & Physical    Patient Name: Flori Tubbs  : 1932  MRN: [id number]  Primary Care Physician:  Kaye Wall APRN  Date of admission: 2023  Date and Time of Service: 2023 at 0400    Subjective      Chief Complaint: fall    History of Present Illness: Flori Tubbs is a 89 y.o. female with PMH of CAD s/p PCI, hypertension, dyslipidemia, previous colon cancer s/p resection, CVA, dementia, glaucoma who lives at [facility] in a locked memory care unit. She presented to [facility] ED 2023 after a fall. She hit her head and face. She is wheelchair bound at baseline and needs help with all ADLs. She is disoriented x3 at baseline as well.     In the ED CT head showed no acute intracranial abnormality.  Right frontal scalp hematoma laceration.  No calvarial fracture.  Moderate generalized volume loss and moderate chronic small vessel ischemic changes.  Facial CT showed comminuted, minimally displaced bilateral nasal bone fractures with overlying soft tissue swelling.  No other acute facial bone fractures.  Cervical spine CT showed no acute fracture or malalignment.  Diffuse heterogeneous lucencies throughout the cervical and upper thoracic spine.  Findings suggest possible diffuse metastatic disease or multiple myeloma.  Correlate with any known history of malignancy and consider outpatient nuclear medicine bone scan.  Moderate multilevel degenerative changes in the cervical spine as discussed above. Labs pending. BP mildly elevated, other vitals wnl. Family would like information from the oncology team regarding the CT cervical spine findings. They are not likely to pursue any treatment but would like to know more about it. They confirmed the patient is a DNR/DNI.       Review of Systems   Unable to perform ROS: Dementia      Personal History     Past Medical History:   Diagnosis Date    Arthritis     CAD (coronary artery disease)      Colon cancer     COVID-19 2020    CVA (cerebral vascular accident)     Dementia     Difficulty walking     Glaucoma 2012    Hypertension     Knee swelling     Myocardial infarction     Palpitation     TIA (transient ischemic attack)     Weakness        Past Surgical History:   Procedure Laterality Date    CATARACT EXTRACTION       SECTION      COLON SURGERY  2018    COLONOSCOPY N/A 10/12/2021    Procedure: COLONOSCOPY;  Surgeon: Tommy Kirkland MD;  Location: [facility] ENDOSCOPY;  Service: Gastroenterology;  Laterality: N/A;  diverticulosis, lower GI Bleed    MASS EXCISION  colon       Family History: family history includes Cancer in her father, mother, and sister.     Social History:  reports that she has never smoked. She has never used smokeless tobacco. She reports that she does not drink alcohol and does not use drugs.    Home Medications:  Prior to Admission Medications       Prescriptions Last Dose Informant Patient Reported? Taking?    acetaminophen (TYLENOL) 325 MG tablet   No No    Take 2 tablets by mouth Every 4 (Four) Hours As Needed for Mild Pain .    atorvastatin (LIPITOR) 10 MG tablet   No No    TAKE 1 TABLET EVERY DAY    bimatoprost (LUMIGAN) 0.01 % ophthalmic drops   Yes No    Administer 1 drop to both eyes Every Night.    Cyanocobalamin (Vitamin B-12) 1000 MCG sublingual tablet   No No    Place 1,000 mcg under the tongue Daily.    docusate sodium (COLACE) 100 MG capsule   Yes No    Take 1 capsule by mouth 2 (Two) Times a Day.    donepezil (Aricept) 10 MG tablet   No No    Take 1 tablet by mouth Every Night.    fluticasone (FLONASE) 50 MCG/ACT nasal spray   Yes No    2 sprays into the nostril(s) as directed by provider Daily.    melatonin 5 MG tablet tablet   No No    Take 1 tablet by mouth At Night As Needed (Sleep).    nitroglycerin (Nitrostat) 0.4 MG SL tablet   No No    Place 1 tablet under the tongue Every 5 (Five) Minutes As Needed for Chest Pain. Take no more than  3 doses in 15 minutes.    omeprazole (priLOSEC) 20 MG capsule   Yes No    Take 1 capsule by mouth Daily.    ondansetron (ZOFRAN) 4 MG tablet   No No    Take 1 tablet by mouth Every 6 (Six) Hours As Needed for Nausea or Vomiting.    pantoprazole (PROTONIX) 40 MG EC tablet   No No    Take 1 tablet by mouth Every Morning Before Breakfast.    Polyethylene Glycol 3350 (MIRALAX PO)   Yes No    Take  by mouth.    risperiDONE (risperDAL) 0.25 MG tablet   Yes No    Take 1 tablet by mouth every night at bedtime.    TIMOLOL MALEATE OP   Yes No    Apply 1 drop to eye(s) as directed by provider 2 (two) times a day. Dose unknown    Vitamin D, Ergocalciferol, 50 MCG (2000 UT) capsule   No No    Take 2,000 Units by mouth Daily.              Allergies:  Allergies   Allergen Reactions    Epinephrine Unknown (See Comments)    Sulfamethoxazole-Trimethoprim Rash       Objective      Vitals:   Temp:  [97 °F (36.1 °C)] 97 °F (36.1 °C)  Heart Rate:  [81-92] 87  Resp:  [16] 16  BP: (142-169)/(70-86) 143/86    Physical Exam  Vitals and nursing note reviewed.   HENT:      Head:      Comments: Right frontal hematoma  Right eye hematoma  Nasal swelling  Eyes:      Extraocular Movements: Extraocular movements intact.      Pupils: Pupils are equal, round, and reactive to light.   Cardiovascular:      Rate and Rhythm: Normal rate and regular rhythm.      Pulses: Normal pulses.      Heart sounds: Normal heart sounds.   Pulmonary:      Effort: Pulmonary effort is normal.      Breath sounds: Normal breath sounds.   Abdominal:      General: Bowel sounds are normal.      Palpations: Abdomen is soft.      Tenderness: There is no abdominal tenderness.   Skin:     General: Skin is warm and dry.   Neurological:      Mental Status: She is disoriented.      Comments: Awake, disoriented, will squeeze hands but will not follow other commands        Result Review    Result Review:  I have personally reviewed the results from the time of this admission to  7/12/2023 05:04 EDT and agree with these findings:  []  Laboratory  []  Microbiology  [x]  Radiology  []  EKG/Telemetry   []  Cardiology/Vascular   []  Pathology  [x]  Old records      Assessment & Plan        Active Hospital Problems:  Active Hospital Problems    Diagnosis     **Fall     Abnormal CT scan, cervical spine     Nasal bone fracture     Primary hypertension     History of CVA (cerebrovascular accident)     Anemia, iron deficiency     History of colon cancer     Chronic coronary artery disease     History of prosthetic heart valve     Dyslipidemia     Glaucoma      Assessment/Plan:    Fall  Right frontal scalp hematoma  Nasal bone fractures  -CT head: no acute intracranial abnormality.  Right frontal scalp hematoma laceration.  No calvarial fracture.  Moderate generalized volume loss and moderate chronic small vessel ischemic changes.    -Facial CT: comminuted, minimally displaced bilateral nasal bone fractures with overlying soft tissue swelling.  No other acute facial bone fractures  -labs pending  -fall precautions/bed alarm    Abnormal Cervical spine CT  -C-spine CT showed no acute fracture or malalignment.  Diffuse heterogeneous lucencies throughout the cervical and upper thoracic spine.  Findings suggest possible diffuse metastatic disease or multiple myeloma.  Correlate with any known history of malignancy and consider outpatient nuclear medicine bone scan  -family requests further information from oncology team    H/o CVA  CAD  Dyslipidemia  -cont home statin    H/o previous colon CA s/p resection    Dementia  -cont home aricept, risperdal when home  -lives in locked memory care unit    Glaucoma  -cont home eye gtts      DVT prophylaxis:  Mechanical DVT prophylaxis orders are present.    CODE STATUS:    Medical Intervention Limits: NO intubation (DNI)  Level Of Support Discussed With: Health Care Surrogate  Code Status (Patient has no pulse and is not breathing): No CPR (Do Not Attempt to  Resuscitate)  Medical Interventions (Patient has pulse or is breathing): Limited Support    Admission Status:  I believe this patient meets observation status.    I discussed the patient's findings and my recommendations with family.    This patient has been examined wearing appropriate Personal Protective Equipment 07/12/23      Electronically signed by QUAN Urena, 07/12/23, 5:05 AM EDT.

## 2023-07-12 NOTE — LETTER
[facility] CASE MANAGEMENT  [facility] [facility] [place] [id number]        July 13, 2023      Patient: Flori Tubbs  YOB: 1932  Date of Visit: 7/12/2023      Attn: Director Helena Hawkins Megan Naegele, RN     Office Phone: [phone]  Office Cell: [phone]

## 2023-07-12 NOTE — DISCHARGE PLACEMENT REQUEST
"Diego Sales (89 y.o. Female)       Date of Birth   06/02/1932    Social Security Number       Address   [facility] [address] [place] [address] [place] [facility]    Home Phone   [phone]    MRN   [id number]       Judaism   Anabaptism    Marital Status                               Admission Date   7/12/23    Admission Type   Emergency    Admitting Provider   Chantal Smith DO    Attending Provider   Jaime Wright MD    Department, Room/Bed   [facility] EMERGENCY DEPARTMENT, EDH2/2       Discharge Date       Discharge Disposition       Discharge Destination                                 Attending Provider: Jaime Wright MD    Allergies: Epinephrine, Sulfamethoxazole-trimethoprim    Isolation: None   Infection: None   Code Status: No CPR    Ht: 167.6 cm (66\")   Wt: 60.8 kg (134 lb)    Admission Cmt: None   Principal Problem: Fall [W19.XXXA]                   Active Insurance as of 7/12/2023       Primary Coverage       Payor Plan Insurance Group Employer/Plan Group    [facility] MEDICARE REPLACEMENT [facility] MEDICARE REPLACEMENT [id number]       Payor Plan Address Payor Plan Phone Number Payor Plan Fax Number Effective Dates    [address] [address]   8/1/2022 - None Entered    [facility] [id number]         Subscriber Name Subscriber Birth Date Member ID       DIEGO SALES 6/2/1932 [id number]                     Emergency Contacts        (Rel.) Home Phone Work Phone Mobile Phone    betty joseph (Daughter) -- -- [phone]    mila alamo (Son) -- -- [phone]                "

## 2023-07-12 NOTE — CASE MANAGEMENT/SOCIAL WORK
"Discharge Planning Assessment  [facility]     Patient Name: Flori Tubbs  MRN: [id number]  Today's Date: 7/12/2023    Admit Date: 7/12/2023    Plan: Anticipate return to [facility] and continue [facility]   Discharge Needs Assessment       Row Name 07/12/23 1237       Living Environment    People in Home facility resident    Current Living Arrangements assisted living facility    Primary Care Provided by homecare agency;other (see comments)  facility staff    Provides Primary Care For no one, unable/limited ability to care for self    Family Caregiver if Needed child(galliea), adult    Family Caregiver Names Daughter Hector, son Portillo; daughter in law Priti    Quality of Family Relationships supportive    Able to Return to Prior Arrangements other (see comments)  Per nurse Hilda at [facility], pt can return \"unless there are changes\"       Resource/Environmental Concerns    Resource/Environmental Concerns none    Transportation Concerns none       Transition Planning    Patient/Family Anticipates Transition to other (see comments)  Assisted living    Patient/Family Anticipated Services at Transition hospice care    Transportation Anticipated family or friend will provide;other (see comments)       Discharge Needs Assessment    Current Outpatient/Agency/Support Group assisted living facility;home hospice    Equipment Currently Used at Home walker, rolling    Concerns to be Addressed discharge planning    Anticipated Changes Related to Illness inability to care for self    Outpatient/Agency/Support Group Needs assisted living facility;home hospice    Current Discharge Risk cognitively impaired;dependent with mobility/activities of daily living                   Discharge Plan       Row Name 07/12/23 9099       Plan    Plan Anticipate return to [facility] and continue [facility]    Patient/Family in Agreement with Plan yes    Plan Comments "  spoke with patient's daughter Hector re: dc plans. She confirms pt resides in memory care unit at [facility] and has [facility]. PCP confirmed. Pt uses facility pharmacy. I confirmed with nurse Hilda at [facility] that patient can return there. She said if there aren't any changes,and that patient remains on hospice.She states DON will otherwise re-eval patient. She and Hector confirm pt has bath aid through hospice BID and that hospice NP sees patient. Zhane with [facility] notified of patient's admission and will send liason to meet with patient's family to discuss dc plans/goals.                  Continued Care and Services - Admitted Since 7/12/2023       Home Medical Care       Service Provider Request Status Selected Services Address Phone Fax Patient Preferred    [facility] Pending - Request Sent N/A [address] [place] [address] [phone] -- --                     Demographic Summary       Row Name 07/12/23 1220       General Information    Admission Type observation    Arrived From home  [facility]    Required Notices Provided Observation Status Notice    Referral Source admission list;physician    Reason for Consult end of life/hospice       Contact Information    Permission Granted to Share Info With facility ;                   Functional Status       Row Name 07/12/23 1237       Functional Status    Usual Activity Tolerance poor    Current Activity Tolerance poor       Functional Status, IADL    Medications completely dependent    Meal Preparation completely dependent    Housekeeping completely dependent    Laundry completely dependent    Shopping completely dependent                       Patient Forms       Row Name 07/12/23 1248       Patient Forms    Important Message from Medicare (IMM) --  MCFADDEN 7/12/23 per registration    Patient Observation Letter Delivered   LESA 7/12/23 per registration                  Evette Qureshi RN, [facility]  Office: [phone]  Fax: [phone]  [email or web address]      I met with patient in room wearing PPE: mask and glasses     Maintained distance greater than six feet and spent </=15 minutes in the room    Evette Qurehsi RN

## 2023-07-12 NOTE — PLAN OF CARE
Goal Outcome Evaluation:  Plan of Care Reviewed With: patient, family           Outcome Evaluation: Flori Tubbs is a 89 y.o. female who presents following fall with R frontal hematoma  PMH: CAD s/p PCI, HTN, HLD, colon CA s/p resection, Dementia, Glaucoma. At baseline, she resides in a Memory Care Unit at [facility].  Pt is very sleepy and oriented to self only.  Son and daughter-in-law provide information as to baseline.  Pt is typically assisted by staff into w/c and requires assist for all ADLs.  At time of evaluation, pt remains lethargic from medications but follows commands. She requires mod A for bed mobility and mod A for sitting balance due to forward lean.  She stands with Max A 1.  She would benefit from return to her previous living environment as this would be best equipped for a person with dementia.  No further PT needs at this time.      Anticipated Discharge Disposition (PT): extended care facility

## 2023-07-12 NOTE — CONSULTS
Hematology/Oncology Inpatient Consultation    Patient name: Flori Tubbs  : 1932  MRN: [id number]  Referring Provider:QUAN Aleman  Reason for Consultation: Abnormal CT cervical spine    Chief complaint: Fall    History of present illness:    89 y.o. female presented to [facility] ER on 2023 after falling at her nursing home.  She has dementia and is on a locked patient care unit.  Her son was present and reports she was diagnosed with dementia a year ago.  She recently had a decrease in her performance status and has not been calling him by name.  Most recently she has been mumbling.  CMP was remarkable for calcium of 10.2.  White count 8.9, hemoglobin 13.7, MCV 94.9 and platelets 241,000.  CT head, facial and cervical spine showed no acute intracranial abnormality.  There was a right frontal scalp hematoma laceration but no calvarial fracture.  Moderate generalized volume loss and moderate chronic small vessel ischemic changes were present.  There were comminuted minimally displaced bilateral nasal bone fractures with overlying soft tissue swelling.  There was no acute fracture or malalignment in the cervical spine.  Diffuse heterogeneous lucencies throughout the cervical and upper thoracic spine were present.  Suggestive of diffuse metastatic disease or multiple myeloma.  Moderate multilevel degenerative changes in the cervical spine were present.  Findings were discussed with her son with desire to further evaluate for possible malignancy.  Her CODE STATUS is DNR/DNI.    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  Diagnosed in 2018, treated with right hemicolectomy per Dr. Duffy.  Her tumor was 3.5 cm, 20 lymph nodes were negative for metastasis and margins were negative.  There was invasion but the invasion did not go through the muscularis propria.  MMR was intact.  Colonoscopy on 10/12/2021 by  Dr. Kirkland, showed multiple small polyps that were not resected.  There was bright red blood from moderate to severe left diverticular disease and grade 2 internal hemorrhoids.    23  Hematology/Oncology was consulted by QUAN Aleman for an abnormal CT cervical spine.    Past Medical History: Cecal cancer .  Bilateral knee OA treated with injections by REKHA Jerome DO.  Diverticulitis and GI bleeding .  Surgical history: Right hemicolectomy .  Colonoscopy .  Cardiac stent placement .  Cholecystectomy years ago.  Social History: She lives at [facility].  She worked minimally outside the home.  No history of smoking or alcohol use.  Family History: Her mother had thyroid cancer.  Allergies: No known allergies.    PCP: Kaye Wall APRN    History:  Past Medical History:   Diagnosis Date    Arthritis     CAD (coronary artery disease)     Colon cancer     COVID-19 2020    CVA (cerebral vascular accident)     Dementia     Difficulty walking     Glaucoma 2012    Hypertension     Knee swelling     Myocardial infarction     Palpitation     TIA (transient ischemic attack)     Weakness    ,   Past Surgical History:   Procedure Laterality Date    CATARACT EXTRACTION       SECTION      COLON SURGERY  2018    COLONOSCOPY N/A 10/12/2021    Procedure: COLONOSCOPY;  Surgeon: Tommy Kirkland MD;  Location: [facility] ENDOSCOPY;  Service: Gastroenterology;  Laterality: N/A;  diverticulosis, lower GI Bleed    MASS EXCISION  colon   ,   Family History   Problem Relation Age of Onset    Cancer Mother         thyroid, breast    Cancer Father         lung, pancreas    Cancer Sister         breast   ,   Social History     Tobacco Use    Smoking status: Never    Smokeless tobacco: Never   Vaping Use    Vaping Use: Never used   Substance Use Topics    Alcohol use: No    Drug use: No   , (Not in a hospital admission)  , Scheduled Meds:  docusate sodium, 100 mg, Oral,  BID  latanoprost, 1 drop, Both Eyes, Nightly  risperiDONE, 0.25 mg, Oral, Nightly  senna-docusate sodium, 2 tablet, Oral, BID  sodium chloride, 10 mL, Intravenous, Q12H  timolol, 1 drop, Both Eyes, Daily    , Continuous Infusions:   , PRN Meds:    acetaminophen **OR** acetaminophen **OR** acetaminophen    aluminum-magnesium hydroxide-simethicone    senna-docusate sodium **AND** polyethylene glycol **AND** bisacodyl **AND** bisacodyl    LORazepam    nitroglycerin    ondansetron **OR** ondansetron    [COMPLETED] Insert Peripheral IV **AND** sodium chloride    sodium chloride    sodium chloride    traMADol   Allergies:  Epinephrine and Sulfamethoxazole-trimethoprim    ROS:  Review of Systems   Unable to perform ROS: Dementia   Constitutional:  Negative for activity change, chills, fatigue, fever and unexpected weight change.   HENT:  Negative for congestion, dental problem, hearing loss, mouth sores, nosebleeds, sore throat and trouble swallowing.    Eyes:  Negative for photophobia and visual disturbance.   Respiratory:  Negative for cough, chest tightness and shortness of breath.    Cardiovascular:  Negative for chest pain, palpitations and leg swelling.   Gastrointestinal:  Negative for abdominal distention, abdominal pain, blood in stool, constipation, diarrhea, nausea and vomiting.   Endocrine: Negative for cold intolerance and heat intolerance.   Genitourinary:  Negative for decreased urine volume, difficulty urinating, frequency, hematuria and urgency.   Musculoskeletal:  Negative for arthralgias and gait problem.   Skin:  Negative for rash and wound.   Neurological:  Negative for dizziness, tremors, seizures, weakness, light-headedness, numbness and headaches.   Hematological:  Negative for adenopathy. Does not bruise/bleed easily.   Psychiatric/Behavioral:  Negative for confusion and hallucinations. The patient is not nervous/anxious.    All other systems reviewed and are negative.     Objective     Vital  "Signs:   /70 (BP Location: Right arm, Patient Position: Lying)   Pulse 78   Temp 98.7 °F (37.1 °C) (Oral)   Resp 14   Ht 167.6 cm (66\")   Wt 60.8 kg (134 lb)   SpO2 95%   BMI 21.63 kg/m²     Physical Exam:  Physical Exam  Vitals and nursing note reviewed.   Constitutional:       General: She is not in acute distress.     Appearance: Normal appearance. She is well-developed and normal weight. She is not diaphoretic.      Comments: Keeps eyes closed and does not respond verbally.   HENT:      Head: Normocephalic and atraumatic.      Nose: Nose normal.      Comments: Steri-Strips intact across nose.  Mild facial edema.     Mouth/Throat:      Mouth: Mucous membranes are moist.      Pharynx: Oropharynx is clear. No oropharyngeal exudate or posterior oropharyngeal erythema.      Comments: Dental fillings.  Eyes:      General: No scleral icterus.     Extraocular Movements: Extraocular movements intact.      Conjunctiva/sclera: Conjunctivae normal.      Pupils: Pupils are equal, round, and reactive to light.   Cardiovascular:      Rate and Rhythm: Normal rate and regular rhythm.      Heart sounds: Normal heart sounds. No murmur heard.     Comments: Cardiac monitor leads.  Pulmonary:      Effort: Pulmonary effort is normal. No respiratory distress.      Breath sounds: Normal breath sounds. No stridor. No wheezing or rales.   Abdominal:      General: Bowel sounds are normal. There is no distension.      Palpations: Abdomen is soft. There is no mass.      Tenderness: There is no abdominal tenderness. There is no guarding.   Genitourinary:     Comments: Deferred   Musculoskeletal:         General: No swelling, tenderness or deformity. Normal range of motion.      Cervical back: Normal range of motion and neck supple.      Right lower leg: No edema.      Left lower leg: No edema.      Comments: Right upper extremity O2 monitor.   Lymphadenopathy:      Cervical: No cervical adenopathy.      Upper Body:      Right " upper body: No supraclavicular adenopathy.      Left upper body: No supraclavicular adenopathy.   Skin:     General: Skin is warm and dry.      Coloration: Skin is not pale.      Findings: No bruising, erythema or rash.      Comments: Left upper extremity IV.   Neurological:      General: No focal deficit present.      Coordination: Coordination normal.   Psychiatric:      Comments: See constitutional.        Results Review:  Lab Results (last 48 hours)       Procedure Component Value Units Date/Time    Comprehensive Metabolic Panel [[id number]]  (Abnormal) Collected: 07/12/23 0512    Specimen: Blood Updated: 07/12/23 0538     Glucose 90 mg/dL      BUN 20 mg/dL      Creatinine 0.94 mg/dL      Sodium 142 mmol/L      Potassium 4.0 mmol/L      Chloride 105 mmol/L      CO2 27.0 mmol/L      Calcium 10.2 mg/dL      Total Protein 7.0 g/dL      Albumin 4.1 g/dL      ALT (SGPT) 12 U/L      AST (SGOT) 14 U/L      Alkaline Phosphatase 75 U/L      Total Bilirubin 0.7 mg/dL      Globulin 2.9 gm/dL      A/G Ratio 1.4 g/dL      BUN/Creatinine Ratio 21.3     Anion Gap 10.0 mmol/L      eGFR 57.4 mL/min/1.73     Narrative:      GFR Normal >60  Chronic Kidney Disease <60  Kidney Failure <15    The GFR formula is only valid for adults with stable renal function between ages 18 and 70.    CBC & Differential [[id number]]  (Abnormal) Collected: 07/12/23 0512    Specimen: Blood Updated: 07/12/23 0524    Narrative:      The following orders were created for panel order CBC & Differential.  Procedure                               Abnormality         Status                     ---------                               -----------         ------                     CBC Auto Differential[[id number]]        Abnormal            Final result                 Please view results for these tests on the individual orders.    CBC Auto Differential [[id number]]  (Abnormal) Collected: 07/12/23 0512    Specimen: Blood Updated: 07/12/23 0524     WBC 8.90  10*3/mm3      RBC 4.38 10*6/mm3      Hemoglobin 13.7 g/dL      Hematocrit 41.6 %      MCV 94.9 fL      MCH 31.2 pg      MCHC 32.9 g/dL      RDW 13.5 %      RDW-SD 44.6 fl      MPV 8.2 fL      Platelets 241 10*3/mm3      Neutrophil % 70.3 %      Lymphocyte % 16.5 %      Monocyte % 6.9 %      Eosinophil % 5.7 %      Basophil % 0.6 %      Neutrophils, Absolute 6.30 10*3/mm3      Lymphocytes, Absolute 1.50 10*3/mm3      Monocytes, Absolute 0.60 10*3/mm3      Eosinophils, Absolute 0.50 10*3/mm3      Basophils, Absolute 0.10 10*3/mm3      nRBC 0.0 /100 WBC              Pending Results: CEA, SPEP, SIFE, light chain ratio, immunoglobulins. Ionized calcium.  CT C/A/P.    Imaging Reviewed:   CT Head Without Contrast    Result Date: 7/12/2023  **This impression includes finding(s) with follow-up recommendations** Head CT: 1.  No acute intracranial abnormality. 2.  Right frontal scalp hematoma and laceration. No calvarial fracture. 3.  Moderate generalized volume loss and moderate chronic small vessel ischemic changes. Facial CT: 1.  Comminuted, minimally displaced bilateral nasal bone fractures with overlying soft tissue swelling. No other acute facial bone fractures. Cervical spine CT: 1.  No acute fracture or malalignment in the cervical spine. 2.  Diffuse heterogeneous lucencies throughout the cervical and upper thoracic spine. Findings suggest possible diffuse metastatic disease or multiple myeloma. Correlate with any known history of malignancy and consider outpatient nuclear medicine bone scan. 3.  Moderate multilevel degenerative changes in the cervical spine as discussed above. Electronically signed by:  Jackson Srivastava M.D.  7/12/2023 1:24 AM Mountain Time    CT Cervical Spine Without Contrast    Result Date: 7/12/2023  **This impression includes finding(s) with follow-up recommendations** Head CT: 1.  No acute intracranial abnormality. 2.  Right frontal scalp hematoma and laceration. No calvarial fracture. 3.  Moderate  generalized volume loss and moderate chronic small vessel ischemic changes. Facial CT: 1.  Comminuted, minimally displaced bilateral nasal bone fractures with overlying soft tissue swelling. No other acute facial bone fractures. Cervical spine CT: 1.  No acute fracture or malalignment in the cervical spine. 2.  Diffuse heterogeneous lucencies throughout the cervical and upper thoracic spine. Findings suggest possible diffuse metastatic disease or multiple myeloma. Correlate with any known history of malignancy and consider outpatient nuclear medicine bone scan. 3.  Moderate multilevel degenerative changes in the cervical spine as discussed above. Electronically signed by:  Jackson Srivastava M.D.  7/12/2023 1:24 AM Mountain Time    CT Facial Bones Without Contrast    Result Date: 7/12/2023  **This impression includes finding(s) with follow-up recommendations** Head CT: 1.  No acute intracranial abnormality. 2.  Right frontal scalp hematoma and laceration. No calvarial fracture. 3.  Moderate generalized volume loss and moderate chronic small vessel ischemic changes. Facial CT: 1.  Comminuted, minimally displaced bilateral nasal bone fractures with overlying soft tissue swelling. No other acute facial bone fractures. Cervical spine CT: 1.  No acute fracture or malalignment in the cervical spine. 2.  Diffuse heterogeneous lucencies throughout the cervical and upper thoracic spine. Findings suggest possible diffuse metastatic disease or multiple myeloma. Correlate with any known history of malignancy and consider outpatient nuclear medicine bone scan. 3.  Moderate multilevel degenerative changes in the cervical spine as discussed above. Electronically signed by:  Jackson Srivastava M.D.  7/12/2023 1:24 AM Mountain Time    XR Hip With or Without Pelvis 2 - 3 View Right    Result Date: 7/12/2023  Impression: 1. Study is limited by diffuse osteopenia. If there is high clinical suspicion additional imaging can be obtained. 2. No  definite acute osseous abnormality given limitations of study Electronically Signed: Simon Berg  7/12/2023 7:42 AM EDT  Workstation ID: [id number]     I have reviewed the patient's labs, imaging, reports, and other clinician documentation.         Assessment & Plan       ASSESSMENT  Abnormal cervical spine CT with history of cecal cancer and hypercalcemia-concern for possible myeloma or metastatic disease.  Will order lab evaluation for myeloma as well as CT C/A/P.  CEA ordered for history of colon cancer. Ionized calcium ordered.  Stage I cecal cancer-she has never recurred and is up-to-date on her surveillance colonoscopy.  Fall with nasal fracture, osteoarthritis, dementia and history of CVA-per primary team.    PLAN  CEA.  CT C/A/P.  SPEP, SIFE, light chain ratio, immunoglobulins.    Ionized calcium.    Patient seen and evaluated by Dr. Clifford.  Electronically signed by QUAN Smith, 07/12/23, 12:32 PM EDT.    I have personally performed a face-to-face diagnostic evaluation on this patient. I have performed a complete history and physical examination, reviewed laboratory studies, and radiographic examinations.  I have completed the majority and substantive portion of the medical decision making.  I have formulated the assessment on this patient and the plan of action as noted above. I have discussed the case with Alice Carpenter NP, have edited/reviewed the note, and agree with the care plan.  The patient fell at nursing home and broke her nasal bones.  On examination, she has facial swelling present and does not verbally communicate.  CT cervical spine has revealed diffuse heterogeneous lucencies throughout the cervical and upper thoracic spine.  Consultation has been obtained for possibility of metastatic disease.  Have discussed limited treatment options in detail with son.  We will check body CTs and myeloma work-up.          I discussed the patient's findings and my recommendations with patient  and family.    Thank you for this consult.  We will be happy to follow along in the care of this patient.     Part of this note may be an electronic transcription/translation of spoken language to printed text using the Dragon Dictation System.      Electronically signed by Rajinder Clifford MD, 07/12/23, 6:04 PM EDT.

## 2023-07-12 NOTE — Clinical Note
Level of Care: Med/Surg [1]   Admitting Physician: ANUP CARBALLO [[id number]]   Attending Physician: ANUP CARBALLO [[id number]]

## 2023-07-12 NOTE — NURSING NOTE
WOCN note:    89 yr old female admitted 7/12/23 after a fall at her facility. WOCN consult received for a pressure injury to the coccyx noted upon admission. Unable to assess at this time. Discussed with primary nurse. We will attempt assessment later today or tomorrow. Initiate pressure injury prevention measures.

## 2023-07-12 NOTE — ED PROVIDER NOTES
Subjective   Chief Complaint   Patient presents with    Fall         History provided by:  EMS personnel, nursing home and relative  Patient is a 89-year-old female with a history dementia, presents the ED after a fall.  Her family is at bedside reports that she got up out of bed, fell and hit her face.  Per the nursing home report the patient complained of right hip pain.  Her family at bedside reports they are unaware of any complaint of hip pain  Review of Systems   Unable to perform ROS: Dementia     Past Medical History:   Diagnosis Date    Arthritis     CAD (coronary artery disease)     Colon cancer     COVID-19 2020    CVA (cerebral vascular accident)     Dementia     Difficulty walking     Glaucoma 2012    Hypertension     Knee swelling     Myocardial infarction     Palpitation     TIA (transient ischemic attack)     Weakness        Allergies   Allergen Reactions    Epinephrine Unknown (See Comments)    Sulfamethoxazole-Trimethoprim Rash       Past Surgical History:   Procedure Laterality Date    CATARACT EXTRACTION       SECTION      COLON SURGERY  2018    COLONOSCOPY N/A 10/12/2021    Procedure: COLONOSCOPY;  Surgeon: Tommy Kirkland MD;  Location: [facility] ENDOSCOPY;  Service: Gastroenterology;  Laterality: N/A;  diverticulosis, lower GI Bleed    MASS EXCISION  colon       Family History   Problem Relation Age of Onset    Cancer Mother         thyroid, breast    Cancer Father         lung, pancreas    Cancer Sister         breast       Social History     Socioeconomic History    Marital status:    Tobacco Use    Smoking status: Never    Smokeless tobacco: Never   Vaping Use    Vaping Use: Never used   Substance and Sexual Activity    Alcohol use: No    Drug use: No    Sexual activity: Not Currently     Partners: Male     Birth control/protection: Post-menopausal           Objective   Physical Exam  Vitals and nursing note reviewed.   Constitutional:       Comments:  Appears chronically ill   HENT:      Head: Normocephalic.        Comments: Contusion noted to forehead, nose.     Right Ear: Tympanic membrane, ear canal and external ear normal.      Left Ear: Tympanic membrane, ear canal and external ear normal.      Nose: Signs of injury and nasal tenderness present.      Right Nostril: No septal hematoma.      Left Nostril: No septal hematoma.      Comments: Blood noted bilateral nares     Mouth/Throat:      Lips: Pink.      Mouth: Mucous membranes are moist.      Pharynx: Oropharynx is clear.   Eyes:      Extraocular Movements: Extraocular movements intact.      Conjunctiva/sclera: Conjunctivae normal.      Pupils: Pupils are equal, round, and reactive to light.   Cardiovascular:      Rate and Rhythm: Normal rate and regular rhythm.      Heart sounds: Normal heart sounds. No murmur heard.    No friction rub. No gallop.   Pulmonary:      Effort: Pulmonary effort is normal.      Breath sounds: Normal breath sounds.   Abdominal:      General: Bowel sounds are normal.      Palpations: Abdomen is soft.      Tenderness: There is no abdominal tenderness. There is no guarding or rebound.   Musculoskeletal:         General: Normal range of motion.      Cervical back: Normal range of motion and neck supple.   Skin:     General: Skin is warm and dry.      Capillary Refill: Capillary refill takes less than 2 seconds.   Neurological:      Mental Status: She is alert. Mental status is at baseline.      Comments: Mental status at baseline per family report       Laceration Repair    Date/Time: 7/12/2023 3:59 AM  Performed by: Trupti Adam APRN  Authorized by: Ricardo Hutchinson MD     Consent:     Consent obtained:  Verbal    Consent given by:  Patient and guardian    Risks discussed:  Infection, pain, poor cosmetic result, poor wound healing, vascular damage, nerve damage and need for additional repair    Alternatives discussed:  No treatment and observation  Universal protocol:      "Immediately prior to procedure, a time out was called: yes      Patient identity confirmed:  Verbally with patient, hospital-assigned identification number and arm band  Anesthesia:     Anesthesia method:  None  Laceration details:     Location:  Face    Face location:  Nose    Length (cm):  0.5  Exploration:     Hemostasis achieved with:  Direct pressure  Treatment:     Area cleansed with:  Saline    Amount of cleaning:  Standard    Irrigation solution:  Sterile saline  Skin repair:     Repair method:  Steri-Strips    Number of Steri-Strips:  2  Approximation:     Approximation:  Close  Repair type:     Repair type:  Simple  Post-procedure details:     Dressing:  Open (no dressing)    Procedure completion:  Tolerated well, no immediate complications           ED Course  ED Course as of 07/12/23 0402   Wed Jul 12, 2023   0331 Spoke with patient's daughter at the bedside regarding the patient's abnormal CT findings in her C-spine and T-spine.  She is going to speak with other family members about how to proceed at this time. [LB]   0358 Discussed with QUAN White [LB]      ED Course User Index  [LB] Trupti Adam APRN      /78   Pulse 81   Temp 97 °F (36.1 °C)   Resp 16   Ht 167.6 cm (66\")   Wt 60.8 kg (134 lb)   SpO2 97%   BMI 21.63 kg/m²   Medications   LORazepam (ATIVAN) tablet 0.5 mg (has no administration in time range)   sodium chloride 0.9 % flush 10 mL (has no administration in time range)     CT Head Without Contrast    Result Date: 7/12/2023  his impression includes finding(s) with follow-up recommendationsHead CT: 1.  No acute intracranial abnormality. 2.  Right frontal scalp hematoma and laceration. No calvarial fracture. 3.  Moderate generalized volume loss and moderate chronic small vessel ischemic changes. Facial CT: 1.  Comminuted, minimally displaced bilateral nasal bone fractures with overlying soft tissue swelling. No other acute facial bone fractures. Cervical spine CT: " 1.  No acute fracture or malalignment in the cervical spine. 2.  Diffuse heterogeneous lucencies throughout the cervical and upper thoracic spine. Findings suggest possible diffuse metastatic disease or multiple myeloma. Correlate with any known history of malignancy and consider outpatient nuclear medicine bone scan. 3.  Moderate multilevel degenerative changes in the cervical spine as discussed above. Electronically signed by:  Jackson Srivastava M.D.  7/12/2023 1:24 AM Mountain Time    CT Cervical Spine Without Contrast    Result Date: 7/12/2023  This impression includes finding(s) with follow-up recommendationsHead CT: 1.  No acute intracranial abnormality. 2.  Right frontal scalp hematoma and laceration. No calvarial fracture. 3.  Moderate generalized volume loss and moderate chronic small vessel ischemic changes. Facial CT: 1.  Comminuted, minimally displaced bilateral nasal bone fractures with overlying soft tissue swelling. No other acute facial bone fractures. Cervical spine CT: 1.  No acute fracture or malalignment in the cervical spine. 2.  Diffuse heterogeneous lucencies throughout the cervical and upper thoracic spine. Findings suggest possible diffuse metastatic disease or multiple myeloma. Correlate with any known history of malignancy and consider outpatient nuclear medicine bone scan. 3.  Moderate multilevel degenerative changes in the cervical spine as discussed above. Electronically signed by:  Jackson Srivastava M.D.  7/12/2023 1:24 AM Mountain Time    CT Facial Bones Without Contrast    Result Date: 7/12/2023  This impression includes finding(s) with follow-up recommendationsHead CT: 1.  No acute intracranial abnormality. 2.  Right frontal scalp hematoma and laceration. No calvarial fracture. 3.  Moderate generalized volume loss and moderate chronic small vessel ischemic changes. Facial CT: 1.  Comminuted, minimally displaced bilateral nasal bone fractures with overlying soft tissue swelling. No other  acute facial bone fractures. Cervical spine CT: 1.  No acute fracture or malalignment in the cervical spine. 2.  Diffuse heterogeneous lucencies throughout the cervical and upper thoracic spine. Findings suggest possible diffuse metastatic disease or multiple myeloma. Correlate with any known history of malignancy and consider outpatient nuclear medicine bone scan. 3.  Moderate multilevel degenerative changes in the cervical spine as discussed above. Electronically signed by:  Jackson Srivastava M.D.  7/12/2023 1:24 AM Mountain Time   Lab Results (last 24 hours)       ** No results found for the last 24 hours. **                                               Medical Decision Making  Patient is a 89-year-old female presents the ED from nursing facility after a fall.  Her daughter is at bedside reports that she got up out of bed tonight, fell hitting her face.  She did sustain a laceration noted to her nose, that was repaired with Steri-Strips, and is uncomplicated.  Mental status at baseline per family report.  Patient went CT head, facial bones, cervical spine.  Reviewed as above.  No acute intracranial abnormality on head CT.  Comminuted minimally displaced nasal bone fracture.  CT cervical spine does reveal abnormalities concerning for metastatic disease or multiple myeloma, I discussed this with the patient's family at the bedside, her daughter is there and she is her POA.  Patient does have a history of colon cancer, however given this finding they would like further opinion on the abnormality.  Patient will be admitted to hospitalist for further evaluation.  Discussed with QUAN White, agrees with current treatment plan.    Problems Addressed:  Abnormal CT scan, neck: complicated acute illness or injury  Closed fracture of nasal bone, initial encounter: acute illness or injury  Fall, initial encounter: acute illness or injury    Amount and/or Complexity of Data Reviewed  Labs: ordered. Decision-making details  documented in ED Course.  Radiology: ordered and independent interpretation performed. Decision-making details documented in ED Course.    Risk  Prescription drug management.  Decision regarding hospitalization.        Final diagnoses:   Fall, initial encounter   Closed fracture of nasal bone, initial encounter   Abnormal CT scan, neck       ED Disposition  ED Disposition       ED Disposition   Decision to Admit    Condition   --    Comment   Level of Care: Med/Surg [1]   Admitting Physician: ANUP CARBALLO [[id number]]   Attending Physician: ANUP CARBALLO [[id number]]                 No follow-up provider specified.       Medication List      No changes were made to your prescriptions during this visit.            Trupti Adam, APRN  07/12/23 0402

## 2023-07-12 NOTE — PLAN OF CARE
Goal Outcome Evaluation:         [facility] and [facility] on face, abrasions to forehead, nasal fracture, pt is hospice care and DNR, family at bedside, coccyx wound, wound care consulted, call light in reach, updated pt and family on plan of care, call light in reach.

## 2023-07-12 NOTE — THERAPY EVALUATION
Patient Name: Flori Tubbs  : 1932    MRN: [id number]                              Today's Date: 2023       Admit Date: 2023    Visit Dx:     ICD-10-CM ICD-9-CM   1. Fall, initial encounter  W19.XXXA E888.9   2. Closed fracture of nasal bone, initial encounter  S02.2XXA 802.0   3. Abnormal CT scan, neck  R93.89 793.99     Patient Active Problem List   Diagnosis    Chronic pain of both knees    Anaclitic depression    Anemia, iron deficiency    Arthritis    Asthma    Atypical chest pain    Chronic coronary artery disease    History of colon cancer    Complete uterovaginal prolapse    Uterine prolapse    Dyslipidemia    Dysphagia    Dyspnea on exertion    Fatigue    Weakness    Glaucoma    History of prosthetic heart valve    Primary hypertension    Localized infection of skin    Mediastinal mass    Multiple thyroid nodules    Myalgia    Other screening mammogram    Palpitations    Panic attack    Postnasal drip    Sebaceous cyst    Skin disorder    History of CVA (cerebrovascular accident)    Vocal cord paralysis    Pain in joint involving lower leg    Anxiety    Medicare annual wellness visit, subsequent    Urge incontinence of urine    Pressure injury of sacral region, stage 1    Chest wall pain    Right shoulder pain    Memory impairment    Post-menopausal    Acute blood loss anemia    Moderate malnutrition    Fall    Abnormal CT scan, cervical spine    Nasal bone fracture     Past Medical History:   Diagnosis Date    Arthritis     CAD (coronary artery disease)     Colon cancer     COVID-19 2020    CVA (cerebral vascular accident)     Dementia     Difficulty walking     Glaucoma 2012    Hypertension     Knee swelling     Myocardial infarction     Palpitation     TIA (transient ischemic attack)     Weakness      Past Surgical History:   Procedure Laterality Date    CATARACT EXTRACTION       SECTION      COLON SURGERY  2018    COLONOSCOPY N/A 10/12/2021    Procedure:  COLONOSCOPY;  Surgeon: Tommy Kirkland MD;  Location: [facility] ENDOSCOPY;  Service: Gastroenterology;  Laterality: N/A;  diverticulosis, lower GI Bleed    MASS EXCISION  colon      General Information       Row Name 07/12/23 1253          Physical Therapy Time and Intention    Document Type evaluation  -CL     Mode of Treatment individual therapy;physical therapy  -CL       Row Name 07/12/23 1253          General Information    Patient Profile Reviewed yes  -CL     Existing Precautions/Restrictions fall  -CL     Barriers to Rehab cognitive status  -CL       Row Name 07/12/23 1253          Living Environment    People in Home facility resident  -CL     Name(s) of People in Home Resides on Memory Care unit at [facility]  -CL       Row Name 07/12/23 1253          Home Main Entrance    Number of Stairs, Main Entrance none  -CL       Row Name 07/12/23 1253          Stairs Within Home, Primary    Number of Stairs, Within Home, Primary none  -CL       Row Name 07/12/23 1253          Cognition    Orientation Status (Cognition) person;oriented to  -CL       Row Name 07/12/23 1253          Safety Issues, Functional Mobility    Safety Issues Affecting Function (Mobility) insight into deficits/self-awareness  -CL     Impairments Affecting Function (Mobility) balance;cognition  -CL     Cognitive Impairments, Mobility Safety/Performance insight into deficits/self-awareness;safety precaution awareness;safety precaution follow-through  -CL               User Key  (r) = Recorded By, (t) = Taken By, (c) = Cosigned By      Initials Name Provider Type    CL Laura Villar PT Physical Therapist                   Mobility       Row Name 07/12/23 1255          Bed Mobility    Bed Mobility bed mobility (all) activities;rolling left;supine-sit;sit-supine  -CL     Rolling Left [place] (Bed Mobility) moderate assist (50% patient effort)  -CL     Supine-Sit [place] (Bed Mobility) moderate assist (50%  patient effort)  -CL     Sit-Supine Telephone (Bed Mobility) moderate assist (50% patient effort)  -CL     Comment, (Bed Mobility) heavy forward lean  -CL       Row Name 07/12/23 1255          Sit-Stand Transfer    Sit-Stand Telephone (Transfers) maximum assist (25% patient effort)  -CL               User Key  (r) = Recorded By, (t) = Taken By, (c) = Cosigned By      Initials Name Provider Type    Laura Marinelli, PT Physical Therapist                   Obj/Interventions       Row Name 07/12/23 1256          Range of Motion Comprehensive    General Range of Motion bilateral lower extremity ROM WFL  -CL     Comment, General Range of Motion resistance to PROM noted  -CL       Row Name 07/12/23 1256          Strength Comprehensive (MMT)    Comment, General Manual Muscle Testing (MMT) Assessment unable to follow commands for MMT  -CL       Row Name 07/12/23 1256          Balance    Balance Assessment sitting static balance;sitting dynamic balance;standing static balance  -CL     Static Sitting Balance moderate assist  -CL     Dynamic Sitting Balance moderate assist  -CL     Position, Sitting Balance sitting edge of bed  -CL     Static Standing Balance maximum assist;1-person assist  -CL               User Key  (r) = Recorded By, (t) = Taken By, (c) = Cosigned By      Initials Name Provider Type    Laura Marinelli, PT Physical Therapist                   Goals/Plan    No documentation.                  Clinical Impression       Row Name 07/12/23 1258          Pain    Pretreatment Pain Rating 0/10 - no pain  -CL     Posttreatment Pain Rating 0/10 - no pain  -CL       Row Name 07/12/23 1258          Plan of Care Review    Plan of Care Reviewed With patient;family  -CL     Outcome Evaluation Flori Tubbs is a 89 y.o. female who presents following fall with R frontal hematoma  PMH: CAD s/p PCI, HTN, HLD, colon CA s/p resection, Dementia, Glaucoma. At baseline, she resides in a Memory Care Unit at  [facility].  Pt is very sleepy and oriented to self only.  Son and daughter-in-law provide information as to baseline.  Pt is typically assisted by staff into w/c and requires assist for all ADLs. She recieves OP Hospice services.   At time of evaluation, pt remains lethargic from medications but follows commands. She requires mod A for bed mobility and mod A for sitting balance due to forward lean.  She stands with Max A 1.  She appears willing to follow directions and limited by cognition and lethargy related to meds.  She would benefit from return to her previous living environment as this would be best equipped for a person with dementia.  No further PT needs at this time.  -CL       Row Name 07/12/23 125          Therapy Assessment/Plan (PT)    Criteria for Skilled Interventions Met (PT) no problems identified which require skilled intervention  -CL     Therapy Frequency (PT) evaluation only  -CL       Row Name 07/12/23 6357          Positioning and Restraints    Pre-Treatment Position in bed  -CL     Post Treatment Position bed  -CL     In Bed notified nsg;supine;call light within reach;exit alarm on;with family/caregiver  -CL               User Key  (r) = Recorded By, (t) = Taken By, (c) = Cosigned By      Initials Name Provider Type    CL Laura Villar, PT Physical Therapist                   Outcome Measures       Row Name 07/12/23 1302          How much help from another person do you currently need...    Turning from your back to your side while in flat bed without using bedrails? 2  -CL     Moving from lying on back to sitting on the side of a flat bed without bedrails? 2  -CL     Moving to and from a bed to a chair (including a wheelchair)? 2  -CL     Standing up from a chair using your arms (e.g., wheelchair, bedside chair)? 2  -CL     Climbing 3-5 steps with a railing? 1  -CL     To walk in hospital room? 2  -CL     AM-PAC 6 Clicks Score (PT) 11  -CL     Highest level of  mobility 4 --> Transferred to chair/commode  -CL       Row Name 07/12/23 1302          Functional Assessment    Outcome Measure Options AM-PAC 6 Clicks Basic Mobility (PT)  -CL               User Key  (r) = Recorded By, (t) = Taken By, (c) = Cosigned By      Initials Name Provider Type    CL Laura Villar PT Physical Therapist                                 Physical Therapy Education       Title: PT OT SLP Therapies (Done)       Topic: Physical Therapy (Done)       Point: Mobility training (Done)       Learning Progress Summary             Family Acceptance, E,TB, VU by CL at 7/12/2023 1302                                         User Key       Initials Effective Dates Name Provider Type Discipline    CL 03/02/22 -  Laura Villar PT Physical Therapist PT                  PT Recommendation and Plan     Plan of Care Reviewed With: patient, family  Outcome Evaluation: Flori Tubbs is a 89 y.o. female who presents following fall with R frontal hematoma  PMH: CAD s/p PCI, HTN, HLD, colon CA s/p resection, Dementia, Glaucoma. At baseline, she resides in a Memory Care Unit at [facility].  Pt is very sleepy and oriented to self only.  Son and daughter-in-law provide information as to baseline.  Pt is typically assisted by staff into w/c and requires assist for all ADLs. She recieves OP Hospice services.   At time of evaluation, pt remains lethargic from medications but follows commands. She requires mod A for bed mobility and mod A for sitting balance due to forward lean.  She stands with Max A 1.  She appears willing to follow directions and limited by cognition and lethargy related to meds.  She would benefit from return to her previous living environment as this would be best equipped for a person with dementia.  No further PT needs at this time.     Time Calculation:    PT Charges       Row Name 07/12/23 1300             Time Calculation    Start Time 1102  -CL      Stop Time 1129  -CL       Time Calculation (min) 27 min  -CL      PT Received On 07/12/23  -CL         Time Calculation- PT    Total Timed Code Minutes- PT 0 minute(s)  -CL                User Key  (r) = Recorded By, (t) = Taken By, (c) = Cosigned By      Initials Name Provider Type    CL Laura Villar, PT Physical Therapist                  Therapy Charges for Today       Code Description Service Date Service Provider Modifiers Qty    03634602929 HC PT EVAL MOD COMPLEXITY 4 7/12/2023 Laura Villar, PT GP 1            PT G-Codes  Outcome Measure Options: AM-PAC 6 Clicks Basic Mobility (PT)  AM-PAC 6 Clicks Score (PT): 11  PT Discharge Summary  Anticipated Discharge Disposition (PT): extended care facility    Laura Villar PT  7/12/2023

## 2023-07-12 NOTE — CASE MANAGEMENT/SOCIAL WORK
Continued Stay Note  [facility]     Patient Name: Flori Tubbs  MRN: [id number]  Today's Date: 7/12/2023    Admit Date: 7/12/2023    Plan: Return to [facility] vs SNF. May need PT/OT eval. [facility] revoked 7/12/23   Discharge Plan       Row Name 07/12/23 1611       Plan    Plan Return to [facility] vs SNF. May need PT/OT eval. [facility] revoked 7/12/23    Plan Comments Oncology work-up in progress.[facility] services revoked 7/12/23, but will continue to follow. Pt may not be able to return to [facility]. Their DON may re-evaluate prior to determining if patient can return. Pt will need PT/OT eval if SNF is considered. DC Barriers: CT Abd/Pelvis/Chest pending                   Discharge Codes    No documentation.                 Evette Qureshi RN, [facility]  Office: [phone]  Fax: [phone]  [email or web address]      Phone communication or documentation only - no physical contact with patient or family.      Evette Qureshi RN

## 2023-07-13 ENCOUNTER — APPOINTMENT (OUTPATIENT)
Dept: CT IMAGING | Facility: HOSPITAL | Age: 88
DRG: 843 | End: 2023-07-13
Payer: MEDICARE

## 2023-07-13 LAB
ALBUMIN SERPL ELPH-MCNC: 3.3 G/DL (ref 2.9–4.4)
ALBUMIN/GLOB SERPL: 1.2 {RATIO} (ref 0.7–1.7)
ALPHA1 GLOB SERPL ELPH-MCNC: 0.3 G/DL (ref 0–0.4)
ALPHA2 GLOB SERPL ELPH-MCNC: 0.8 G/DL (ref 0.4–1)
ANION GAP SERPL CALCULATED.3IONS-SCNC: 9 MMOL/L (ref 5–15)
B-GLOBULIN SERPL ELPH-MCNC: 0.9 G/DL (ref 0.7–1.3)
BASOPHILS # BLD AUTO: 0 10*3/MM3 (ref 0–0.2)
BASOPHILS NFR BLD AUTO: 0.6 % (ref 0–1.5)
BUN SERPL-MCNC: 16 MG/DL (ref 8–23)
BUN/CREAT SERPL: 19.3 (ref 7–25)
CALCIUM SPEC-SCNC: 9.7 MG/DL (ref 8.2–9.6)
CHLORIDE SERPL-SCNC: 105 MMOL/L (ref 98–107)
CO2 SERPL-SCNC: 28 MMOL/L (ref 22–29)
CREAT SERPL-MCNC: 0.83 MG/DL (ref 0.57–1)
DEPRECATED RDW RBC AUTO: 43.3 FL (ref 37–54)
EGFRCR SERPLBLD CKD-EPI 2021: 66.6 ML/MIN/1.73
EOSINOPHIL # BLD AUTO: 0.8 10*3/MM3 (ref 0–0.4)
EOSINOPHIL NFR BLD AUTO: 10.4 % (ref 0.3–6.2)
ERYTHROCYTE [DISTWIDTH] IN BLOOD BY AUTOMATED COUNT: 13.2 % (ref 12.3–15.4)
GAMMA GLOB SERPL ELPH-MCNC: 0.9 G/DL (ref 0.4–1.8)
GLOBULIN SER CALC-MCNC: 2.8 G/DL (ref 2.2–3.9)
GLUCOSE SERPL-MCNC: 92 MG/DL (ref 65–99)
HCT VFR BLD AUTO: 38.2 % (ref 34–46.6)
HGB BLD-MCNC: 12.9 G/DL (ref 12–15.9)
KAPPA LC FREE SER-MCNC: 40 MG/L (ref 3.3–19.4)
KAPPA LC FREE/LAMBDA FREE SER: 1.73 {RATIO} (ref 0.26–1.65)
LABORATORY COMMENT REPORT: NORMAL
LAMBDA LC FREE SERPL-MCNC: 23.1 MG/L (ref 5.7–26.3)
LYMPHOCYTES # BLD AUTO: 1.6 10*3/MM3 (ref 0.7–3.1)
LYMPHOCYTES NFR BLD AUTO: 20.4 % (ref 19.6–45.3)
M PROTEIN SERPL ELPH-MCNC: NORMAL G/DL
MCH RBC QN AUTO: 31.7 PG (ref 26.6–33)
MCHC RBC AUTO-ENTMCNC: 33.7 G/DL (ref 31.5–35.7)
MCV RBC AUTO: 94.2 FL (ref 79–97)
MONOCYTES # BLD AUTO: 0.8 10*3/MM3 (ref 0.1–0.9)
MONOCYTES NFR BLD AUTO: 9.8 % (ref 5–12)
NEUTROPHILS NFR BLD AUTO: 4.6 10*3/MM3 (ref 1.7–7)
NEUTROPHILS NFR BLD AUTO: 58.8 % (ref 42.7–76)
NRBC BLD AUTO-RTO: 0 /100 WBC (ref 0–0.2)
PLATELET # BLD AUTO: 225 10*3/MM3 (ref 140–450)
PMV BLD AUTO: 8 FL (ref 6–12)
POTASSIUM SERPL-SCNC: 4.4 MMOL/L (ref 3.5–5.2)
PROT SERPL-MCNC: 6.1 G/DL (ref 6–8.5)
RBC # BLD AUTO: 4.06 10*6/MM3 (ref 3.77–5.28)
SODIUM SERPL-SCNC: 142 MMOL/L (ref 136–145)
WBC NRBC COR # BLD: 7.9 10*3/MM3 (ref 3.4–10.8)

## 2023-07-13 PROCEDURE — 82784 ASSAY IGA/IGD/IGG/IGM EACH: CPT | Performed by: NURSE PRACTITIONER

## 2023-07-13 PROCEDURE — 86334 IMMUNOFIX E-PHORESIS SERUM: CPT | Performed by: NURSE PRACTITIONER

## 2023-07-13 PROCEDURE — 97165 OT EVAL LOW COMPLEX 30 MIN: CPT

## 2023-07-13 PROCEDURE — G0378 HOSPITAL OBSERVATION PER HR: HCPCS

## 2023-07-13 PROCEDURE — 85025 COMPLETE CBC W/AUTO DIFF WBC: CPT | Performed by: NURSE PRACTITIONER

## 2023-07-13 PROCEDURE — 25510000001 IOPAMIDOL PER 1 ML: Performed by: FAMILY MEDICINE

## 2023-07-13 PROCEDURE — 71260 CT THORAX DX C+: CPT

## 2023-07-13 PROCEDURE — 80048 BASIC METABOLIC PNL TOTAL CA: CPT | Performed by: NURSE PRACTITIONER

## 2023-07-13 PROCEDURE — 74177 CT ABD & PELVIS W/CONTRAST: CPT

## 2023-07-13 RX ADMIN — Medication 10 ML: at 20:41

## 2023-07-13 RX ADMIN — Medication 10 ML: at 12:01

## 2023-07-13 RX ADMIN — IOPAMIDOL 100 ML: 755 INJECTION, SOLUTION INTRAVENOUS at 10:44

## 2023-07-13 RX ADMIN — TIMOLOL MALEATE 1 DROP: 2.5 SOLUTION/ DROPS OPHTHALMIC at 12:01

## 2023-07-13 NOTE — NURSING NOTE
WOCN note:    WOCN follow up for a pressure injury noted upon admission. Spoke with primary nurse after her assessment. No open wound noted at this time. There is scarring noted to the area and daughter reported that she has had wounds in the past that have healed.   Recommend to continue pressure injury prevention measures with frequent turning/repositioning and skin care for any episodes of incontinence. A low air loss pump has been ordered. We will continue to follow as needed.

## 2023-07-13 NOTE — CASE MANAGEMENT/SOCIAL WORK
Social Work Assessment  [facility]     Patient Name: Flori Tubbs  MRN: [id number]  Today's Date: 7/13/2023    Admit Date: 7/12/2023     Discharge Plan       Row Name 07/13/23 1353       Plan    Plan Return to [facility] vs SNF. May need PT/OT eval. Precert required for SNF. [facility] revoked 7/12/23. PASRR Level 1 QFR.           Adrianna Gonzalez, MSW, LSW  Medical Social Worker  Ph [phone]  Fax [phone]  [email or web address]

## 2023-07-13 NOTE — NURSING NOTE
Contacted by CT to see if pt had drank contrast.  Family states pt was not able to drink contrast.  Pt sleeping at present time.  CT states they will follow up with pt in am and ask to follow up with family and Oncology regarding test if pt unable to drink contrast.  Will pass on to on coming nurse.

## 2023-07-13 NOTE — PLAN OF CARE
Goal Outcome Evaluation:   Pt rested well during the night.  Pt denies any pain or discomfort and no s/s of pain noted.  Will continue to monitor pt status.

## 2023-07-13 NOTE — DISCHARGE PLACEMENT REQUEST
"Diego Sales (89 y.o. Female)       Date of Birth   06/02/1932    Social Security Number       Address   [address] [facility] [place] [address]  [place] [facility]    Home Phone   [phone]    MRN   [id number]       Orthodox   Yazidi    Marital Status                               Admission Date   7/12/23    Admission Type   Emergency    Admitting Provider   Chantal Smith DO    Attending Provider   Lino Manjarrez MD    Department, Room/Bed   [facility] 3C MEDICAL INPATIENT, 363/1       Discharge Date       Discharge Disposition       Discharge Destination                                 Attending Provider: Lino Manjarrez MD    Allergies: Epinephrine, Sulfamethoxazole-trimethoprim    Isolation: None   Infection: None   Code Status: No CPR    Ht: 167.6 cm (66\")   Wt: 51 kg (112 lb 7 oz)    Admission Cmt: None   Principal Problem: Fall [W19.XXXA]                 Active Insurance as of 7/12/2023       Primary Coverage       Payor Plan Insurance Group Employer/Plan Group    [facility] MEDICARE REPLACEMENT [facility] MEDICARE REPLACEMENT [id number]       Payor Plan Address Payor Plan Phone Number Payor Plan Fax Number Effective Dates    [address] [address]   8/1/2022 - None Entered    [facility] [id number]         Subscriber Name Subscriber Birth Date Member ID       DIEGO SALES 6/2/1932 [id number]                   Emergency Contacts        (Rel.) Home Phone Work Phone Mobile Phone    betty joseph (Daughter) -- -- [phone]    mila alamo (Son) -- -- [phone]             History & Physical        Cindy Washington APRN at 07/12/23 0355       Attestation signed by Chantal Smith DO at 07/12/23 0602    Documentation reviewed, agree with plan of ANDREY supervised by physician.  There were no concerns on patient care and management made known by the ANDREY to supervising physician.                        [facility] " Medicine Services  History & Physical    Patient Name: Flori Tubbs  : 1932  MRN: [id number]  Primary Care Physician:  Kaye Wall APRN  Date of admission: 2023  Date and Time of Service: 2023 at 0400    Subjective      Chief Complaint: fall    History of Present Illness: Flori Tubbs is a 89 y.o. female with PMH of CAD s/p PCI, hypertension, dyslipidemia, previous colon cancer s/p resection, CVA, dementia, glaucoma who lives at [facility] in a locked memory care unit. She presented to [facility] ED 2023 after a fall. She hit her head and face. She is wheelchair bound at baseline and needs help with all ADLs. She is disoriented x3 at baseline as well.     In the ED CT head showed no acute intracranial abnormality.  Right frontal scalp hematoma laceration.  No calvarial fracture.  Moderate generalized volume loss and moderate chronic small vessel ischemic changes.  Facial CT showed comminuted, minimally displaced bilateral nasal bone fractures with overlying soft tissue swelling.  No other acute facial bone fractures.  Cervical spine CT showed no acute fracture or malalignment.  Diffuse heterogeneous lucencies throughout the cervical and upper thoracic spine.  Findings suggest possible diffuse metastatic disease or multiple myeloma.  Correlate with any known history of malignancy and consider outpatient nuclear medicine bone scan.  Moderate multilevel degenerative changes in the cervical spine as discussed above. Labs pending. BP mildly elevated, other vitals wnl. Family would like information from the oncology team regarding the CT cervical spine findings. They are not likely to pursue any treatment but would like to know more about it. They confirmed the patient is a DNR/DNI.       Review of Systems   Unable to perform ROS: Dementia      Personal History     Past Medical History:   Diagnosis Date    Arthritis     CAD (coronary artery disease)     Colon cancer     COVID-19  2020    CVA (cerebral vascular accident)     Dementia     Difficulty walking     Glaucoma 2012    Hypertension     Knee swelling     Myocardial infarction     Palpitation     TIA (transient ischemic attack)     Weakness        Past Surgical History:   Procedure Laterality Date    CATARACT EXTRACTION       SECTION      COLON SURGERY  2018    COLONOSCOPY N/A 10/12/2021    Procedure: COLONOSCOPY;  Surgeon: Tommy Kirkland MD;  Location: [facility] ENDOSCOPY;  Service: Gastroenterology;  Laterality: N/A;  diverticulosis, lower GI Bleed    MASS EXCISION  colon       Family History: family history includes Cancer in her father, mother, and sister.     Social History:  reports that she has never smoked. She has never used smokeless tobacco. She reports that she does not drink alcohol and does not use drugs.    Home Medications:  Prior to Admission Medications       Prescriptions Last Dose Informant Patient Reported? Taking?    acetaminophen (TYLENOL) 325 MG tablet   No No    Take 2 tablets by mouth Every 4 (Four) Hours As Needed for Mild Pain .    atorvastatin (LIPITOR) 10 MG tablet   No No    TAKE 1 TABLET EVERY DAY    bimatoprost (LUMIGAN) 0.01 % ophthalmic drops   Yes No    Administer 1 drop to both eyes Every Night.    Cyanocobalamin (Vitamin B-12) 1000 MCG sublingual tablet   No No    Place 1,000 mcg under the tongue Daily.    docusate sodium (COLACE) 100 MG capsule   Yes No    Take 1 capsule by mouth 2 (Two) Times a Day.    donepezil (Aricept) 10 MG tablet   No No    Take 1 tablet by mouth Every Night.    fluticasone (FLONASE) 50 MCG/ACT nasal spray   Yes No    2 sprays into the nostril(s) as directed by provider Daily.    melatonin 5 MG tablet tablet   No No    Take 1 tablet by mouth At Night As Needed (Sleep).    nitroglycerin (Nitrostat) 0.4 MG SL tablet   No No    Place 1 tablet under the tongue Every 5 (Five) Minutes As Needed for Chest Pain. Take no more than 3 doses in 15 minutes.     omeprazole (priLOSEC) 20 MG capsule   Yes No    Take 1 capsule by mouth Daily.    ondansetron (ZOFRAN) 4 MG tablet   No No    Take 1 tablet by mouth Every 6 (Six) Hours As Needed for Nausea or Vomiting.    pantoprazole (PROTONIX) 40 MG EC tablet   No No    Take 1 tablet by mouth Every Morning Before Breakfast.    Polyethylene Glycol 3350 (MIRALAX PO)   Yes No    Take  by mouth.    risperiDONE (risperDAL) 0.25 MG tablet   Yes No    Take 1 tablet by mouth every night at bedtime.    TIMOLOL MALEATE OP   Yes No    Apply 1 drop to eye(s) as directed by provider 2 (two) times a day. Dose unknown    Vitamin D, Ergocalciferol, 50 MCG (2000 UT) capsule   No No    Take 2,000 Units by mouth Daily.              Allergies:  Allergies   Allergen Reactions    Epinephrine Unknown (See Comments)    Sulfamethoxazole-Trimethoprim Rash       Objective      Vitals:   Temp:  [97 °F (36.1 °C)] 97 °F (36.1 °C)  Heart Rate:  [81-92] 87  Resp:  [16] 16  BP: (142-169)/(70-86) 143/86    Physical Exam  Vitals and nursing note reviewed.   HENT:      Head:      Comments: Right frontal hematoma  Right eye hematoma  Nasal swelling  Eyes:      Extraocular Movements: Extraocular movements intact.      Pupils: Pupils are equal, round, and reactive to light.   Cardiovascular:      Rate and Rhythm: Normal rate and regular rhythm.      Pulses: Normal pulses.      Heart sounds: Normal heart sounds.   Pulmonary:      Effort: Pulmonary effort is normal.      Breath sounds: Normal breath sounds.   Abdominal:      General: Bowel sounds are normal.      Palpations: Abdomen is soft.      Tenderness: There is no abdominal tenderness.   Skin:     General: Skin is warm and dry.   Neurological:      Mental Status: She is disoriented.      Comments: Awake, disoriented, will squeeze hands but will not follow other commands        Result Review    Result Review:  I have personally reviewed the results from the time of this admission to 7/12/2023 05:04 EDT and agree  with these findings:  []  Laboratory  []  Microbiology  [x]  Radiology  []  EKG/Telemetry   []  Cardiology/Vascular   []  Pathology  [x]  Old records      Assessment & Plan        Active Hospital Problems:  Active Hospital Problems    Diagnosis     **Fall     Abnormal CT scan, cervical spine     Nasal bone fracture     Primary hypertension     History of CVA (cerebrovascular accident)     Anemia, iron deficiency     History of colon cancer     Chronic coronary artery disease     History of prosthetic heart valve     Dyslipidemia     Glaucoma      Assessment/Plan:    Fall  Right frontal scalp hematoma  Nasal bone fractures  -CT head: no acute intracranial abnormality.  Right frontal scalp hematoma laceration.  No calvarial fracture.  Moderate generalized volume loss and moderate chronic small vessel ischemic changes.    -Facial CT: comminuted, minimally displaced bilateral nasal bone fractures with overlying soft tissue swelling.  No other acute facial bone fractures  -labs pending  -fall precautions/bed alarm    Abnormal Cervical spine CT  -C-spine CT showed no acute fracture or malalignment.  Diffuse heterogeneous lucencies throughout the cervical and upper thoracic spine.  Findings suggest possible diffuse metastatic disease or multiple myeloma.  Correlate with any known history of malignancy and consider outpatient nuclear medicine bone scan  -family requests further information from oncology team    H/o CVA  CAD  Dyslipidemia  -cont home statin    H/o previous colon CA s/p resection    Dementia  -cont home aricept, risperdal when home  -lives in locked memory care unit    Glaucoma  -cont home eye gtts      DVT prophylaxis:  Mechanical DVT prophylaxis orders are present.    CODE STATUS:    Medical Intervention Limits: NO intubation (DNI)  Level Of Support Discussed With: Health Care Surrogate  Code Status (Patient has no pulse and is not breathing): No CPR (Do Not Attempt to Resuscitate)  Medical Interventions  (Patient has pulse or is breathing): Limited Support    Admission Status:  I believe this patient meets observation status.    I discussed the patient's findings and my recommendations with family.    This patient has been examined wearing appropriate Personal Protective Equipment 23      Electronically signed by QUAN Urena, 23, 5:05 AM EDT.     Electronically signed by Chantal Smith DO at 23 0602          Physician Progress Notes (last 24 hours)        Rajinder Clifford MD at 23 0714          Hematology/Oncology Inpatient Progress Note    PATIENT NAME: Flori Tubbs  : 1932  MRN: [id number]    CHIEF COMPLAINT: Abnormal cervical spine CT with history of cecal cancer and hypercalcemia; Stage I cecal cancer     HISTORY OF PRESENT ILLNESS:    89 y.o. female presented to [facility] ED on 2023 after falling at her nursing home.  She has dementia and is on a locked patient care unit.  Her son was present and reports she was diagnosed with dementia a year ago.  She recently had a decrease in her performance status and has not been calling him by name.  Most recently she has been mumbling.  CMP was remarkable for calcium of 10.2.  White count 8.9, hemoglobin 13.7, MCV 94.9 and platelets 241,000.  CT head, facial and cervical spine showed no acute intracranial abnormality.  There was a right frontal scalp hematoma laceration but no calvarial fracture.  Moderate generalized volume loss and moderate chronic small vessel ischemic changes were present.  There were comminuted minimally displaced bilateral nasal bone fractures with overlying soft tissue swelling.  There was no acute fracture or malalignment in the cervical spine.  Diffuse heterogeneous lucencies throughout the cervical and upper thoracic spine were present.  Suggestive of diffuse metastatic disease or multiple myeloma.  Moderate multilevel degenerative changes in the cervical spine were present.   Findings were discussed with her son with desire to further evaluate for possible malignancy.  Her CODE STATUS is DNR/DNI.    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  Diagnosed in August 2018, treated with right hemicolectomy per Dr. Duffy.  Her tumor was 3.5 cm, 20 lymph nodes were negative for metastasis and margins were negative.  There was invasion but the invasion did not go through the muscularis propria.  MMR was intact.  Colonoscopy on 10/12/2021 by Dr. Kirkland, showed multiple small polyps that were not resected.  There was bright red blood from moderate to severe left diverticular disease and grade 2 internal hemorrhoids.     07/12/23  Hematology/Oncology was consulted by QUAN Aleman for an abnormal CT cervical spine.     Past Medical History: Cecal cancer 2018.  Bilateral knee OA treated with injections by REKHA Jerome DO.  Diverticulitis and GI bleeding 2021.  Surgical history: Right hemicolectomy 2018.  Colonoscopy 2021.  Cardiac stent placement 2017.  Cholecystectomy years ago.  Social History: She lives at [facility].  She worked minimally outside the home.  No history of smoking or alcohol use.  Family History: Her mother had thyroid cancer.  Allergies: No known allergies.     PCP: Kaye Wall APRN    INTERVAL HISTORY:  7/13/2023 - Ionized calcium 1.33 (1.20-1.30), CEA 1.81 (<3 in non-smoker), Calcium 9.7 (8.2-9.6).    History of present illness reviewed since last visit and changes noted on 07/13/23.    Subjective   Not speaking intelligibly.     ROS:  Review of Systems   Unable to perform ROS: Patient nonverbal      MEDICATIONS:    Scheduled Meds:  docusate sodium, 100 mg, Oral, BID  latanoprost, 1 drop, Both Eyes, Nightly  risperiDONE, 0.25 mg, Oral, Nightly  senna-docusate sodium, 2 tablet, Oral, BID  sodium chloride, 10 mL, Intravenous, Q12H  timolol, 1 drop, Both Eyes, Daily    Continuous  "Infusions:      PRN Meds:    acetaminophen **OR** acetaminophen **OR** acetaminophen    aluminum-magnesium hydroxide-simethicone    senna-docusate sodium **AND** polyethylene glycol **AND** bisacodyl **AND** bisacodyl    LORazepam    nitroglycerin    ondansetron **OR** ondansetron    [COMPLETED] Insert Peripheral IV **AND** sodium chloride    sodium chloride    sodium chloride    traMADol     ALLERGIES:    Allergies   Allergen Reactions    Epinephrine Unknown (See Comments)    Sulfamethoxazole-Trimethoprim Rash     Objective    VITALS:   /81 (BP Location: Left arm, Patient Position: Lying)   Pulse 107   Temp 99.3 °F (37.4 °C) (Axillary)   Resp 14   Ht 167.6 cm (66\")   Wt 51 kg (112 lb 7 oz)   SpO2 97%   BMI 18.15 kg/m²     PHYSICAL EXAM:  Physical Exam  Vitals reviewed.   Constitutional:       General: She is not in acute distress.     Appearance: She is well-developed. She is not diaphoretic.   HENT:      Head: Normocephalic and atraumatic.      Comments: Face is swollen.     Nose:      Comments: Nose is swollen.  Steri-Strip at bridge of nose.     Mouth/Throat:      Pharynx: No oropharyngeal exudate.      Comments: Dental fillings.  Eyes:      Conjunctiva/sclera: Conjunctivae normal.      Comments: Periorbital ecchymosis.   Neck:      Thyroid: No thyromegaly.      Vascular: No JVD.   Cardiovascular:      Rate and Rhythm: Regular rhythm. Tachycardia present.      Heart sounds: Normal heart sounds. No murmur heard.     Comments: Cardiac monitor leads.  Pulmonary:      Effort: Pulmonary effort is normal. No respiratory distress.      Breath sounds: Normal breath sounds.   Abdominal:      General: Bowel sounds are normal.      Palpations: Abdomen is soft.      Tenderness: There is no abdominal tenderness. There is no guarding.   Musculoskeletal:         General: Normal range of motion.      Cervical back: Normal range of motion and neck supple.      Comments: Right hand oxygen saturation monitor.  Left " upper extremity peripheral IV.   Skin:     General: Skin is warm and dry.      Findings: No erythema or rash.      Comments: Scab noted to right forehead.   Neurological:      Mental Status: She is alert.      Comments: Patient does not speak intelligibly.  She mumbles.  She responds to physical stimuli and loud voice       RECENT LABS:  Lab Results (last 24 hours)       Procedure Component Value Units Date/Time    Basic Metabolic Panel [[id number]]  (Abnormal) Collected: 07/13/23 0028    Specimen: Blood Updated: 07/13/23 0133     Glucose 92 mg/dL      BUN 16 mg/dL      Creatinine 0.83 mg/dL      Sodium 142 mmol/L      Potassium 4.4 mmol/L      Chloride 105 mmol/L      CO2 28.0 mmol/L      Calcium 9.7 mg/dL      BUN/Creatinine Ratio 19.3     Anion Gap 9.0 mmol/L      eGFR 66.6 mL/min/1.73     Narrative:      GFR Normal >60  Chronic Kidney Disease <60  Kidney Failure <15    The GFR formula is only valid for adults with stable renal function between ages 18 and 70.    CBC Auto Differential [[id number]]  (Abnormal) Collected: 07/13/23 0028    Specimen: Blood Updated: 07/13/23 0119     WBC 7.90 10*3/mm3      RBC 4.06 10*6/mm3      Hemoglobin 12.9 g/dL      Hematocrit 38.2 %      MCV 94.2 fL      MCH 31.7 pg      MCHC 33.7 g/dL      RDW 13.2 %      RDW-SD 43.3 fl      MPV 8.0 fL      Platelets 225 10*3/mm3      Neutrophil % 58.8 %      Lymphocyte % 20.4 %      Monocyte % 9.8 %      Eosinophil % 10.4 %      Basophil % 0.6 %      Neutrophils, Absolute 4.60 10*3/mm3      Lymphocytes, Absolute 1.60 10*3/mm3      Monocytes, Absolute 0.80 10*3/mm3      Eosinophils, Absolute 0.80 10*3/mm3      Basophils, Absolute 0.00 10*3/mm3      nRBC 0.0 /100 WBC     Immunofixation, Serum [[id number]] Collected: 07/13/23 0028    Specimen: Blood Updated: 07/13/23 0105    CEA [[id number]] Collected: 07/12/23 1430    Specimen: Blood Updated: 07/12/23 2013     CEA 1.81 ng/mL     Narrative:      CEA Reference Range:    Non Smokers:   Less than 3  ng/mL  Smokers:       Less than 5 ng/mL  Results may be falsely decreased if patient taking Biotin.    Testing Method: Roche [employer]ostics Electrochemiluminescence Immunoassay(ECLIA)  Values obtained with different assay methods or kits cannot be used interchangeably.    Calcium, Ionized [[id number]]  (Abnormal) Collected: 07/12/23 1430    Specimen: Blood Updated: 07/12/23 1506     Ionized Calcium 1.33 mmol/L     Immunoglobulin Free LT Chains Blood [[id number]] Collected: 07/12/23 1430    Specimen: Blood Updated: 07/12/23 1450    Protein Electrophoresis, Total [[id number]] Collected: 07/12/23 1430    Specimen: Blood Updated: 07/12/23 1450          PENDING RESULTS:SPEP, SIFE, light chain ratio, immunoglobulins.    IMAGING REVIEWED:  CT Head Without Contrast    Result Date: 7/12/2023  **This impression includes finding(s) with follow-up recommendations** Head CT: 1.  No acute intracranial abnormality. 2.  Right frontal scalp hematoma and laceration. No calvarial fracture. 3.  Moderate generalized volume loss and moderate chronic small vessel ischemic changes. Facial CT: 1.  Comminuted, minimally displaced bilateral nasal bone fractures with overlying soft tissue swelling. No other acute facial bone fractures. Cervical spine CT: 1.  No acute fracture or malalignment in the cervical spine. 2.  Diffuse heterogeneous lucencies throughout the cervical and upper thoracic spine. Findings suggest possible diffuse metastatic disease or multiple myeloma. Correlate with any known history of malignancy and consider outpatient nuclear medicine bone scan. 3.  Moderate multilevel degenerative changes in the cervical spine as discussed above. Electronically signed by:  Jackson Srivastava M.D.  7/12/2023 1:24 AM Mountain Time    CT Cervical Spine Without Contrast    Result Date: 7/12/2023  **This impression includes finding(s) with follow-up recommendations** Head CT: 1.  No acute intracranial abnormality. 2.  Right frontal scalp hematoma and  laceration. No calvarial fracture. 3.  Moderate generalized volume loss and moderate chronic small vessel ischemic changes. Facial CT: 1.  Comminuted, minimally displaced bilateral nasal bone fractures with overlying soft tissue swelling. No other acute facial bone fractures. Cervical spine CT: 1.  No acute fracture or malalignment in the cervical spine. 2.  Diffuse heterogeneous lucencies throughout the cervical and upper thoracic spine. Findings suggest possible diffuse metastatic disease or multiple myeloma. Correlate with any known history of malignancy and consider outpatient nuclear medicine bone scan. 3.  Moderate multilevel degenerative changes in the cervical spine as discussed above. Electronically signed by:  Jackson Srivastava M.D.  7/12/2023 1:24 AM Mountain Time    CT Facial Bones Without Contrast    Result Date: 7/12/2023  **This impression includes finding(s) with follow-up recommendations** Head CT: 1.  No acute intracranial abnormality. 2.  Right frontal scalp hematoma and laceration. No calvarial fracture. 3.  Moderate generalized volume loss and moderate chronic small vessel ischemic changes. Facial CT: 1.  Comminuted, minimally displaced bilateral nasal bone fractures with overlying soft tissue swelling. No other acute facial bone fractures. Cervical spine CT: 1.  No acute fracture or malalignment in the cervical spine. 2.  Diffuse heterogeneous lucencies throughout the cervical and upper thoracic spine. Findings suggest possible diffuse metastatic disease or multiple myeloma. Correlate with any known history of malignancy and consider outpatient nuclear medicine bone scan. 3.  Moderate multilevel degenerative changes in the cervical spine as discussed above. Electronically signed by:  Jackson Srivastava M.D.  7/12/2023 1:24 AM Mountain Time    XR Hip With or Without Pelvis 2 - 3 View Right    Result Date: 7/12/2023  Impression: 1. Study is limited by diffuse osteopenia. If there is high clinical  suspicion additional imaging can be obtained. 2. No definite acute osseous abnormality given limitations of study Electronically Signed: Simon Berg  7/12/2023 7:42 AM EDT  Workstation ID: [id number]     I have reviewed the patient's labs, imaging, reports, and other clinician documentation.    Assessment & Plan   ASSESSMENT  Abnormal cervical spine CT with history of cecal cancer and hypercalcemia-concern for possible multiple myeloma or metastatic disease.  Pending lab evaluation for myeloma and CT C/A/P.  CEA was normal. Ionized calcium was elevated on admission but calcium is now low. Patient has been on outpatient hospice with [facility]   Stage I cecal cancer-she has never recurred and is up-to-date on her surveillance colonoscopy. CEA normal.   Fall with nasal fracture, osteoarthritis, dementia, and history of CVA-per primary team.     PLAN  CT C/A/P - ordered without po contrast as patient not able to follow directions to drink contrast.   SPEP, SIFE, light chain ratio, immunoglobulins are pending.   Patient may not be able to return to [facility] due to decline in condition.     Electronically signed by QUAN Islas, 07/13/23, 10:31 AM EDT.     I have personally performed a face-to-face diagnostic evaluation on this patient. I have performed a complete history and physical examination, reviewed laboratory studies, and radiographic examinations.  I have completed the majority and substantive portion of the medical decision making.  I have formulated the assessment on this patient and the plan of action as noted above. I have discussed the case with Brittany Hodgson NP, have edited/reviewed the note, and agree with the care plan.  The patient keeps her eyes closed.  She does respond to simple commands but cannot engage in a conversation.  She does have facial swelling and ecchymoses.  CEA is normal.  CT scans have not been performed yet because she was  not able to drink contrast.  We will proceed with CTs with IV contrast only.      I discussed the patient's findings and my recommendations with patient.    Part of this note may be an electronic transcription/translation of spoken language to printed text using the Dragon Dictation System.     Electronically signed by Rajinder Clifford MD, 23, 12:57 PM EDT.      Electronically signed by Rajinder Clifford MD at 23 1257          Consult Notes (all)        Rajidner Clifford MD at 23 1212        Consult Orders    1. Hematology & Oncology Inpatient Consult [[id number]] ordered by Cindy Washington APRN at 23 0502                 Hematology/Oncology Inpatient Consultation    Patient name: Flori Tubbs  : 1932  MRN: [id number]  Referring Provider:QUAN Aleman  Reason for Consultation: Abnormal CT cervical spine    Chief complaint: Fall    History of present illness:    89 y.o. female presented to [facility] ER on 2023 after falling at her nursing home.  She has dementia and is on a locked patient care unit.  Her son was present and reports she was diagnosed with dementia a year ago.  She recently had a decrease in her performance status and has not been calling him by name.  Most recently she has been mumbling.  CMP was remarkable for calcium of 10.2.  White count 8.9, hemoglobin 13.7, MCV 94.9 and platelets 241,000.  CT head, facial and cervical spine showed no acute intracranial abnormality.  There was a right frontal scalp hematoma laceration but no calvarial fracture.  Moderate generalized volume loss and moderate chronic small vessel ischemic changes were present.  There were comminuted minimally displaced bilateral nasal bone fractures with overlying soft tissue swelling.  There was no acute fracture or malalignment in the cervical spine.  Diffuse heterogeneous lucencies throughout the cervical and upper thoracic spine were present.  Suggestive of diffuse  metastatic disease or multiple myeloma.  Moderate multilevel degenerative changes in the cervical spine were present.  Findings were discussed with her son with desire to further evaluate for possible malignancy.  Her CODE STATUS is DNR/DNI.    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  Diagnosed in 2018, treated with right hemicolectomy per Dr. Duffy.  Her tumor was 3.5 cm, 20 lymph nodes were negative for metastasis and margins were negative.  There was invasion but the invasion did not go through the muscularis propria.  MMR was intact.  Colonoscopy on 10/12/2021 by Dr. Kirkland, showed multiple small polyps that were not resected.  There was bright red blood from moderate to severe left diverticular disease and grade 2 internal hemorrhoids.    23  Hematology/Oncology was consulted by QUAN Aleman for an abnormal CT cervical spine.    Past Medical History: Cecal cancer .  Bilateral knee OA treated with injections by REKHA Jerome DO.  Diverticulitis and GI bleeding .  Surgical history: Right hemicolectomy .  Colonoscopy .  Cardiac stent placement .  Cholecystectomy years ago.  Social History: She lives at [facility].  She worked minimally outside the home.  No history of smoking or alcohol use.  Family History: Her mother had thyroid cancer.  Allergies: No known allergies.    PCP: Kaye Wall APRN    History:  Past Medical History:   Diagnosis Date    Arthritis     CAD (coronary artery disease)     Colon cancer     COVID-19 2020    CVA (cerebral vascular accident)     Dementia     Difficulty walking     Glaucoma 2012    Hypertension     Knee swelling     Myocardial infarction     Palpitation     TIA (transient ischemic attack)     Weakness    ,   Past Surgical History:   Procedure Laterality Date    CATARACT EXTRACTION       SECTION      COLON SURGERY  2018     COLONOSCOPY N/A 10/12/2021    Procedure: COLONOSCOPY;  Surgeon: Tommy Kirkland MD;  Location: [facility] ENDOSCOPY;  Service: Gastroenterology;  Laterality: N/A;  diverticulosis, lower GI Bleed    MASS EXCISION  colon   ,   Family History   Problem Relation Age of Onset    Cancer Mother         thyroid, breast    Cancer Father         lung, pancreas    Cancer Sister         breast   ,   Social History     Tobacco Use    Smoking status: Never    Smokeless tobacco: Never   Vaping Use    Vaping Use: Never used   Substance Use Topics    Alcohol use: No    Drug use: No   , (Not in a hospital admission)  , Scheduled Meds:  docusate sodium, 100 mg, Oral, BID  latanoprost, 1 drop, Both Eyes, Nightly  risperiDONE, 0.25 mg, Oral, Nightly  senna-docusate sodium, 2 tablet, Oral, BID  sodium chloride, 10 mL, Intravenous, Q12H  timolol, 1 drop, Both Eyes, Daily    , Continuous Infusions:   , PRN Meds:    acetaminophen **OR** acetaminophen **OR** acetaminophen    aluminum-magnesium hydroxide-simethicone    senna-docusate sodium **AND** polyethylene glycol **AND** bisacodyl **AND** bisacodyl    LORazepam    nitroglycerin    ondansetron **OR** ondansetron    [COMPLETED] Insert Peripheral IV **AND** sodium chloride    sodium chloride    sodium chloride    traMADol   Allergies:  Epinephrine and Sulfamethoxazole-trimethoprim    ROS:  Review of Systems   Unable to perform ROS: Dementia   Constitutional:  Negative for activity change, chills, fatigue, fever and unexpected weight change.   HENT:  Negative for congestion, dental problem, hearing loss, mouth sores, nosebleeds, sore throat and trouble swallowing.    Eyes:  Negative for photophobia and visual disturbance.   Respiratory:  Negative for cough, chest tightness and shortness of breath.    Cardiovascular:  Negative for chest pain, palpitations and leg swelling.   Gastrointestinal:  Negative for abdominal distention, abdominal pain, blood in stool, constipation, diarrhea,  "nausea and vomiting.   Endocrine: Negative for cold intolerance and heat intolerance.   Genitourinary:  Negative for decreased urine volume, difficulty urinating, frequency, hematuria and urgency.   Musculoskeletal:  Negative for arthralgias and gait problem.   Skin:  Negative for rash and wound.   Neurological:  Negative for dizziness, tremors, seizures, weakness, light-headedness, numbness and headaches.   Hematological:  Negative for adenopathy. Does not bruise/bleed easily.   Psychiatric/Behavioral:  Negative for confusion and hallucinations. The patient is not nervous/anxious.    All other systems reviewed and are negative.     Objective     Vital Signs:   /70 (BP Location: Right arm, Patient Position: Lying)   Pulse 78   Temp 98.7 °F (37.1 °C) (Oral)   Resp 14   Ht 167.6 cm (66\")   Wt 60.8 kg (134 lb)   SpO2 95%   BMI 21.63 kg/m²     Physical Exam:  Physical Exam  Vitals and nursing note reviewed.   Constitutional:       General: She is not in acute distress.     Appearance: Normal appearance. She is well-developed and normal weight. She is not diaphoretic.      Comments: Keeps eyes closed and does not respond verbally.   HENT:      Head: Normocephalic and atraumatic.      Nose: Nose normal.      Comments: Steri-Strips intact across nose.  Mild facial edema.     Mouth/Throat:      Mouth: Mucous membranes are moist.      Pharynx: Oropharynx is clear. No oropharyngeal exudate or posterior oropharyngeal erythema.      Comments: Dental fillings.  Eyes:      General: No scleral icterus.     Extraocular Movements: Extraocular movements intact.      Conjunctiva/sclera: Conjunctivae normal.      Pupils: Pupils are equal, round, and reactive to light.   Cardiovascular:      Rate and Rhythm: Normal rate and regular rhythm.      Heart sounds: Normal heart sounds. No murmur heard.     Comments: Cardiac monitor leads.  Pulmonary:      Effort: Pulmonary effort is normal. No respiratory distress.      Breath " sounds: Normal breath sounds. No stridor. No wheezing or rales.   Abdominal:      General: Bowel sounds are normal. There is no distension.      Palpations: Abdomen is soft. There is no mass.      Tenderness: There is no abdominal tenderness. There is no guarding.   Genitourinary:     Comments: Deferred   Musculoskeletal:         General: No swelling, tenderness or deformity. Normal range of motion.      Cervical back: Normal range of motion and neck supple.      Right lower leg: No edema.      Left lower leg: No edema.      Comments: Right upper extremity O2 monitor.   Lymphadenopathy:      Cervical: No cervical adenopathy.      Upper Body:      Right upper body: No supraclavicular adenopathy.      Left upper body: No supraclavicular adenopathy.   Skin:     General: Skin is warm and dry.      Coloration: Skin is not pale.      Findings: No bruising, erythema or rash.      Comments: Left upper extremity IV.   Neurological:      General: No focal deficit present.      Coordination: Coordination normal.   Psychiatric:      Comments: See constitutional.        Results Review:  Lab Results (last 48 hours)       Procedure Component Value Units Date/Time    Comprehensive Metabolic Panel [[id number]]  (Abnormal) Collected: 07/12/23 0512    Specimen: Blood Updated: 07/12/23 0538     Glucose 90 mg/dL      BUN 20 mg/dL      Creatinine 0.94 mg/dL      Sodium 142 mmol/L      Potassium 4.0 mmol/L      Chloride 105 mmol/L      CO2 27.0 mmol/L      Calcium 10.2 mg/dL      Total Protein 7.0 g/dL      Albumin 4.1 g/dL      ALT (SGPT) 12 U/L      AST (SGOT) 14 U/L      Alkaline Phosphatase 75 U/L      Total Bilirubin 0.7 mg/dL      Globulin 2.9 gm/dL      A/G Ratio 1.4 g/dL      BUN/Creatinine Ratio 21.3     Anion Gap 10.0 mmol/L      eGFR 57.4 mL/min/1.73     Narrative:      GFR Normal >60  Chronic Kidney Disease <60  Kidney Failure <15    The GFR formula is only valid for adults with stable renal function between ages 18 and 70.     CBC & Differential [[id number]]  (Abnormal) Collected: 07/12/23 0512    Specimen: Blood Updated: 07/12/23 0524    Narrative:      The following orders were created for panel order CBC & Differential.  Procedure                               Abnormality         Status                     ---------                               -----------         ------                     CBC Auto Differential[[id number]]        Abnormal            Final result                 Please view results for these tests on the individual orders.    CBC Auto Differential [[id number]]  (Abnormal) Collected: 07/12/23 0512    Specimen: Blood Updated: 07/12/23 0524     WBC 8.90 10*3/mm3      RBC 4.38 10*6/mm3      Hemoglobin 13.7 g/dL      Hematocrit 41.6 %      MCV 94.9 fL      MCH 31.2 pg      MCHC 32.9 g/dL      RDW 13.5 %      RDW-SD 44.6 fl      MPV 8.2 fL      Platelets 241 10*3/mm3      Neutrophil % 70.3 %      Lymphocyte % 16.5 %      Monocyte % 6.9 %      Eosinophil % 5.7 %      Basophil % 0.6 %      Neutrophils, Absolute 6.30 10*3/mm3      Lymphocytes, Absolute 1.50 10*3/mm3      Monocytes, Absolute 0.60 10*3/mm3      Eosinophils, Absolute 0.50 10*3/mm3      Basophils, Absolute 0.10 10*3/mm3      nRBC 0.0 /100 WBC              Pending Results: CEA, SPEP, SIFE, light chain ratio, immunoglobulins. Ionized calcium.  CT C/A/P.    Imaging Reviewed:   CT Head Without Contrast    Result Date: 7/12/2023  **This impression includes finding(s) with follow-up recommendations** Head CT: 1.  No acute intracranial abnormality. 2.  Right frontal scalp hematoma and laceration. No calvarial fracture. 3.  Moderate generalized volume loss and moderate chronic small vessel ischemic changes. Facial CT: 1.  Comminuted, minimally displaced bilateral nasal bone fractures with overlying soft tissue swelling. No other acute facial bone fractures. Cervical spine CT: 1.  No acute fracture or malalignment in the cervical spine. 2.  Diffuse heterogeneous lucencies  throughout the cervical and upper thoracic spine. Findings suggest possible diffuse metastatic disease or multiple myeloma. Correlate with any known history of malignancy and consider outpatient nuclear medicine bone scan. 3.  Moderate multilevel degenerative changes in the cervical spine as discussed above. Electronically signed by:  Jackson Srivastava M.D.  7/12/2023 1:24 AM Mountain Time    CT Cervical Spine Without Contrast    Result Date: 7/12/2023  **This impression includes finding(s) with follow-up recommendations** Head CT: 1.  No acute intracranial abnormality. 2.  Right frontal scalp hematoma and laceration. No calvarial fracture. 3.  Moderate generalized volume loss and moderate chronic small vessel ischemic changes. Facial CT: 1.  Comminuted, minimally displaced bilateral nasal bone fractures with overlying soft tissue swelling. No other acute facial bone fractures. Cervical spine CT: 1.  No acute fracture or malalignment in the cervical spine. 2.  Diffuse heterogeneous lucencies throughout the cervical and upper thoracic spine. Findings suggest possible diffuse metastatic disease or multiple myeloma. Correlate with any known history of malignancy and consider outpatient nuclear medicine bone scan. 3.  Moderate multilevel degenerative changes in the cervical spine as discussed above. Electronically signed by:  Jackson Srivastava M.D.  7/12/2023 1:24 AM Mountain Time    CT Facial Bones Without Contrast    Result Date: 7/12/2023  **This impression includes finding(s) with follow-up recommendations** Head CT: 1.  No acute intracranial abnormality. 2.  Right frontal scalp hematoma and laceration. No calvarial fracture. 3.  Moderate generalized volume loss and moderate chronic small vessel ischemic changes. Facial CT: 1.  Comminuted, minimally displaced bilateral nasal bone fractures with overlying soft tissue swelling. No other acute facial bone fractures. Cervical spine CT: 1.  No acute fracture or malalignment in  the cervical spine. 2.  Diffuse heterogeneous lucencies throughout the cervical and upper thoracic spine. Findings suggest possible diffuse metastatic disease or multiple myeloma. Correlate with any known history of malignancy and consider outpatient nuclear medicine bone scan. 3.  Moderate multilevel degenerative changes in the cervical spine as discussed above. Electronically signed by:  Jackson Srivastava M.D.  7/12/2023 1:24 AM Mountain Time    XR Hip With or Without Pelvis 2 - 3 View Right    Result Date: 7/12/2023  Impression: 1. Study is limited by diffuse osteopenia. If there is high clinical suspicion additional imaging can be obtained. 2. No definite acute osseous abnormality given limitations of study Electronically Signed: Simon Berg  7/12/2023 7:42 AM EDT  Workstation ID: [id number]     I have reviewed the patient's labs, imaging, reports, and other clinician documentation.        Assessment & Plan       ASSESSMENT  Abnormal cervical spine CT with history of cecal cancer and hypercalcemia-concern for possible myeloma or metastatic disease.  Will order lab evaluation for myeloma as well as CT C/A/P.  CEA ordered for history of colon cancer. Ionized calcium ordered.  Stage I cecal cancer-she has never recurred and is up-to-date on her surveillance colonoscopy.  Fall with nasal fracture, osteoarthritis, dementia and history of CVA-per primary team.    PLAN  CEA.  CT C/A/P.  SPEP, SIFE, light chain ratio, immunoglobulins.    Ionized calcium.    Patient seen and evaluated by Dr. Clifford.  Electronically signed by QUAN Smith, 07/12/23, 12:32 PM EDT.    I have personally performed a face-to-face diagnostic evaluation on this patient. I have performed a complete history and physical examination, reviewed laboratory studies, and radiographic examinations.  I have completed the majority and substantive portion of the medical decision making.  I have formulated the assessment on this patient and the plan  of action as noted above. I have discussed the case with Alice Carpenter, FAITH, have edited/reviewed the note, and agree with the care plan.  The patient fell at nursing home and broke her nasal bones.  On examination, she has facial swelling present and does not verbally communicate.  CT cervical spine has revealed diffuse heterogeneous lucencies throughout the cervical and upper thoracic spine.  Consultation has been obtained for possibility of metastatic disease.  Have discussed limited treatment options in detail with son.  We will check body CTs and myeloma work-up.    I discussed the patient's findings and my recommendations with patient and family.    Thank you for this consult.  We will be happy to follow along in the care of this patient.     Part of this note may be an electronic transcription/translation of spoken language to printed text using the Dragon Dictation System.      Electronically signed by Rajinder Clifford MD, 07/12/23, 6:04 PM EDT.      Electronically signed by Rajinder Clifford MD at 07/12/23 7817          Physical Therapy Notes (all)        Laura Villar, PT at 07/12/23 1303  Version 1 of 1         Goal Outcome Evaluation:  Plan of Care Reviewed With: patient, family           Outcome Evaluation: Flori Tubbs is a 89 y.o. female who presents following fall with R frontal hematoma  PMH: CAD s/p PCI, HTN, HLD, colon CA s/p resection, Dementia, Glaucoma. At baseline, she resides in a Memory Care Unit at [facility].  Pt is very sleepy and oriented to self only.  Son and daughter-in-law provide information as to baseline.  Pt is typically assisted by staff into w/c and requires assist for all ADLs.  At time of evaluation, pt remains lethargic from medications but follows commands. She requires mod A for bed mobility and mod A for sitting balance due to forward lean.  She stands with Max A 1.  She would benefit from return to her previous living environment as this would be  best equipped for a person with dementia.  No further PT needs at this time.      Anticipated Discharge Disposition (PT): extended care facility    Electronically signed by Laura Villar, PT at 23 1303       Laura Villar, PT at 23 1308  Version 1 of 1         Patient Name: Flori uTbbs  : 1932    MRN: [id number]                              Today's Date: 2023       Admit Date: 2023    Visit Dx:     ICD-10-CM ICD-9-CM   1. Fall, initial encounter  W19.XXXA E888.9   2. Closed fracture of nasal bone, initial encounter  S02.2XXA 802.0   3. Abnormal CT scan, neck  R93.89 793.99     Patient Active Problem List   Diagnosis    Chronic pain of both knees    Anaclitic depression    Anemia, iron deficiency    Arthritis    Asthma    Atypical chest pain    Chronic coronary artery disease    History of colon cancer    Complete uterovaginal prolapse    Uterine prolapse    Dyslipidemia    Dysphagia    Dyspnea on exertion    Fatigue    Weakness    Glaucoma    History of prosthetic heart valve    Primary hypertension    Localized infection of skin    Mediastinal mass    Multiple thyroid nodules    Myalgia    Other screening mammogram    Palpitations    Panic attack    Postnasal drip    Sebaceous cyst    Skin disorder    History of CVA (cerebrovascular accident)    Vocal cord paralysis    Pain in joint involving lower leg    Anxiety    Medicare annual wellness visit, subsequent    Urge incontinence of urine    Pressure injury of sacral region, stage 1    Chest wall pain    Right shoulder pain    Memory impairment    Post-menopausal    Acute blood loss anemia    Moderate malnutrition    Fall    Abnormal CT scan, cervical spine    Nasal bone fracture     Past Medical History:   Diagnosis Date    Arthritis     CAD (coronary artery disease)     Colon cancer     COVID-19 2020    CVA (cerebral vascular accident)     Dementia     Difficulty walking     Glaucoma 2012    Hypertension      Knee swelling     Myocardial infarction     Palpitation     TIA (transient ischemic attack)     Weakness      Past Surgical History:   Procedure Laterality Date    CATARACT EXTRACTION       SECTION      COLON SURGERY  2018    COLONOSCOPY N/A 10/12/2021    Procedure: COLONOSCOPY;  Surgeon: Tommy Kirkland MD;  Location: [facility] ENDOSCOPY;  Service: Gastroenterology;  Laterality: N/A;  diverticulosis, lower GI Bleed    MASS EXCISION  colon      General Information       Row Name 23 1253          Physical Therapy Time and Intention    Document Type evaluation  -CL     Mode of Treatment individual therapy;physical therapy  -CL       Row Name 23 1253          General Information    Patient Profile Reviewed yes  -CL     Existing Precautions/Restrictions fall  -CL     Barriers to Rehab cognitive status  -CL       Row Name 23 1253          Living Environment    People in Home facility resident  -CL     Name(s) of People in Home Resides on Memory Care unit at [facility]  -CL       Row Name 23 1253          Home Main Entrance    Number of Stairs, Main Entrance none  -CL       Row Name 23 1253          Stairs Within Home, Primary    Number of Stairs, Within Home, Primary none  -CL       Row Name 23 1253          Cognition    Orientation Status (Cognition) person;oriented to  -CL       Row Name 23 1253          Safety Issues, Functional Mobility    Safety Issues Affecting Function (Mobility) insight into deficits/self-awareness  -CL     Impairments Affecting Function (Mobility) balance;cognition  -CL     Cognitive Impairments, Mobility Safety/Performance insight into deficits/self-awareness;safety precaution awareness;safety precaution follow-through  -CL               Mobility       Row Name 23 1255          Bed Mobility    Bed Mobility bed mobility (all) activities;rolling left;supine-sit;sit-supine  -CL     Rolling Left [place] (Bed  Mobility) moderate assist (50% patient effort)  -CL     Supine-Sit [place] (Bed Mobility) moderate assist (50% patient effort)  -CL     Sit-Supine [place] (Bed Mobility) moderate assist (50% patient effort)  -CL     Comment, (Bed Mobility) heavy forward lean  -CL       Row Name 07/12/23 1255          Sit-Stand Transfer    Sit-Stand [place] (Transfers) maximum assist (25% patient effort)  -CL               Obj/Interventions       Row Name 07/12/23 1256          Range of Motion Comprehensive    General Range of Motion bilateral lower extremity ROM WFL  -CL     Comment, General Range of Motion resistance to PROM noted  -CL       Row Name 07/12/23 1256          Strength Comprehensive (MMT)    Comment, General Manual Muscle Testing (MMT) Assessment unable to follow commands for MMT  -CL       Row Name 07/12/23 1256          Balance    Balance Assessment sitting static balance;sitting dynamic balance;standing static balance  -CL     Static Sitting Balance moderate assist  -CL     Dynamic Sitting Balance moderate assist  -CL     Position, Sitting Balance sitting edge of bed  -CL     Static Standing Balance maximum assist;1-person assist  -CL               Clinical Impression       Row Name 07/12/23 1258          Pain    Pretreatment Pain Rating 0/10 - no pain  -CL     Posttreatment Pain Rating 0/10 - no pain  -CL       Row Name 07/12/23 1258          Plan of Care Review    Plan of Care Reviewed With patient;family  -CL     Outcome Evaluation Flori Tubbs is a 89 y.o. female who presents following fall with R frontal hematoma  PMH: CAD s/p PCI, HTN, HLD, colon CA s/p resection, Dementia, Glaucoma. At baseline, she resides in a Memory Care Unit at [facility].  Pt is very sleepy and oriented to self only.  Son and daughter-in-law provide information as to baseline.  Pt is typically assisted by staff into w/c and requires assist for all ADLs. She recieves OP Hospice services.   At time of  evaluation, pt remains lethargic from medications but follows commands. She requires mod A for bed mobility and mod A for sitting balance due to forward lean.  She stands with Max A 1.  She appears willing to follow directions and limited by cognition and lethargy related to meds.  She would benefit from return to her previous living environment as this would be best equipped for a person with dementia.  No further PT needs at this time.  -CL       Row Name 07/12/23 1258          Therapy Assessment/Plan (PT)    Criteria for Skilled Interventions Met (PT) no problems identified which require skilled intervention  -CL     Therapy Frequency (PT) evaluation only  -CL       Row Name 07/12/23 1258          Positioning and Restraints    Pre-Treatment Position in bed  -CL     Post Treatment Position bed  -CL     In Bed notified nsg;supine;call light within reach;exit alarm on;with family/caregiver  -CL               User Key  (r) = Recorded By, (t) = Taken By, (c) = Cosigned By      Initials Name Provider Type    CL Laura Villra, PT Physical Therapist                   Outcome Measures       Row Name 07/12/23 1302          How much help from another person do you currently need...    Turning from your back to your side while in flat bed without using bedrails? 2  -CL     Moving from lying on back to sitting on the side of a flat bed without bedrails? 2  -CL     Moving to and from a bed to a chair (including a wheelchair)? 2  -CL     Standing up from a chair using your arms (e.g., wheelchair, bedside chair)? 2  -CL     Climbing 3-5 steps with a railing? 1  -CL     To walk in hospital room? 2  -CL     AM-PAC 6 Clicks Score (PT) 11  -CL     Highest level of mobility 4 --> Transferred to chair/commode  -CL       Row Name 07/12/23 1300          Functional Assessment    Outcome Measure Options AM-PAC 6 Clicks Basic Mobility (PT)  -CL               User Key  (r) = Recorded By, (t) = Taken By, (c) = Cosigned By      Initials  Name Provider Type    Laura Marinelli, PT Physical Therapist             PT Recommendation and Plan     Plan of Care Reviewed With: patient, family  Outcome Evaluation: Flori Tubbs is a 89 y.o. female who presents following fall with R frontal hematoma  PMH: CAD s/p PCI, HTN, HLD, colon CA s/p resection, Dementia, Glaucoma. At baseline, she resides in a Memory Care Unit at [facility].  Pt is very sleepy and oriented to self only.  Son and daughter-in-law provide information as to baseline.  Pt is typically assisted by staff into w/c and requires assist for all ADLs. She recieves OP Hospice services.   At time of evaluation, pt remains lethargic from medications but follows commands. She requires mod A for bed mobility and mod A for sitting balance due to forward lean.  She stands with Max A 1.  She appears willing to follow directions and limited by cognition and lethargy related to meds.  She would benefit from return to her previous living environment as this would be best equipped for a person with dementia.  No further PT needs at this time.     Time Calculation:    PT Charges       Row Name 07/12/23 1308             Time Calculation    Start Time 1102  -CL      Stop Time 1129  -CL      Time Calculation (min) 27 min  -CL      PT Received On 07/12/23  -CL         Time Calculation- PT    Total Timed Code Minutes- PT 0 minute(s)  -CL                User Key  (r) = Recorded By, (t) = Taken By, (c) = Cosigned By      Initials Name Provider Type    Laura Marinelli, PT Physical Therapist                  Therapy Charges for Today       Code Description Service Date Service Provider Modifiers Qty    [id number]  PT EVAL MOD COMPLEXITY 4 7/12/2023 Laura Villar, PT GP 1            PT G-Codes  Outcome Measure Options: AM-PAC 6 Clicks Basic Mobility (PT)  AM-PAC 6 Clicks Score (PT): 11  PT Discharge Summary  Anticipated Discharge Disposition (PT): extended care facility    Laura RUSSELL  Aurea, PT  7/12/2023      Electronically signed by Laura Villar, PT at 07/12/23 1308          Occupational Therapy Notes (all)        Lamonte Pino, OT at 07/13/23 8995          Goal Outcome Evaluation:  Plan of Care Reviewed With: patient, daughter           Outcome Evaluation: Flori Tubbs is a 89 y.o. female who presents following fall with R frontal hematoma PMH: CAD s/p PCI, HTN, HLD, colon CA s/p resection, Dementia, Glaucoma. At baseline, she resides in a Memory Care Unit at [facility]. Patient minimally arousable this date. Daughter present to provide baseline information. Pt is typically assisted by two staff members into w/c and requires total assist for all ADLs. She recieves OP Hospice services. This date, pt not following commands for assessment which daughter reports can be typical. She requires Max A for bed mobility. Limited CELESTINE shoulder ROM noted to approx 90. Pt appears to be at baseline functional level, with no skilled OT needs. Daughter reports the plan is to return to ECF and resume Hospice care. Will sign off.      Anticipated Discharge Disposition (OT): extended care facility    Electronically signed by Lamonte Pino, OT at 07/13/23 1043

## 2023-07-13 NOTE — PLAN OF CARE
Goal Outcome Evaluation:  Plan of Care Reviewed With: patient, daughter           Outcome Evaluation: Flori Tubbs is a 89 y.o. female who presents following fall with R frontal hematoma PMH: CAD s/p PCI, HTN, HLD, colon CA s/p resection, Dementia, Glaucoma. At baseline, she resides in a Memory Care Unit at [facility]. Patient minimally arousable this date. Daughter present to provide baseline information. Pt is typically assisted by two staff members into w/c and requires total assist for all ADLs. She recieves OP Hospice services. This date, pt not following commands for assessment which daughter reports can be typical. She requires Max A for bed mobility. Limited CELESTINE shoulder ROM noted to approx 90. Pt appears to be at baseline functional level, with no skilled OT needs. Daughter reports the plan is to return to ECF and resume Hospice care. Will sign off.      Anticipated Discharge Disposition (OT): extended care facility

## 2023-07-13 NOTE — PLAN OF CARE
Goal Outcome Evaluation:   Patient was admitted from [facility] with hospice care. Patient may not be able to return to [facility] and may need SNF.

## 2023-07-13 NOTE — CASE MANAGEMENT/SOCIAL WORK
Continued Stay Note   Stan     Patient Name: Flori Tubbs  MRN: [id number]  Today's Date: 7/13/2023    Admit Date: 7/12/2023    Plan: Return to [facility] at [facility] with [facility].   Discharge Plan       Row Name 07/13/23 1413       Plan    Plan Return to [facility] include 234 beds at the [facility] at [facility] with [facility].    Patient/Family in Agreement with Plan yes    Plan Comments CM met with pt and family at bedside and confirmed [facility] preferred pharmacy is [facility]. Updated in chart. CM contacted [facility] and obtained fax number for director Jammie Broussard. Faxed physician progress notes and PT/OT evals to [phone]. CM contacted [facility] liaison [place] and confirmed that plan is for pt to return to [facility] with [facility].      Megan Naegele, RN     Office Phone: [phone]  Office Cell: [phone]

## 2023-07-13 NOTE — PROGRESS NOTES
[facility]  INPATIENT PROGRESS NOTE    Length of Stay: 0  Date of Admission: 7/12/2023  Primary Care Physician: Kaye Wall APRN    Subjective   Chief Complaint: Generalized weakness  HPI:    Patient has increasing weakness.  P.o. intake is very minimal.  Family at bedside do not want feeding tube.    Review of Systems       All pertinent negatives and positives are as above. All other systems have been reviewed and are negative unless otherwise stated.     Objective    Temp:  [98.1 °F (36.7 °C)-99.3 °F (37.4 °C)] 99.3 °F (37.4 °C)  Heart Rate:  [] 107  Resp:  [14-16] 14  BP: (144-167)/(66-88) 144/81  Physical Exam  Vitals and nursing note reviewed.   Constitutional:       General: She is not in acute distress.     Appearance: She is well-developed. She is not diaphoretic.   HENT:      Head: Normocephalic and atraumatic.   Cardiovascular:      Rate and Rhythm: Normal rate.   Pulmonary:      Effort: Pulmonary effort is normal. No respiratory distress.      Breath sounds: No wheezing.   Abdominal:      General: There is no distension.      Palpations: Abdomen is soft.   Musculoskeletal:         General: Normal range of motion.   Skin:     General: Skin is warm and dry.   Neurological:      Mental Status: She is alert.      Cranial Nerves: No cranial nerve deficit.   Psychiatric:         Behavior: Behavior normal.         Thought Content: Thought content normal.         Judgment: Judgment normal.             Results Review:  I have reviewed the labs, radiology results, and diagnostic studies.    Laboratory Data:   Lab Results (last 24 hours)       Procedure Component Value Units Date/Time    Basic Metabolic Panel [[id number]]  (Abnormal) Collected: 07/13/23 0028    Specimen: Blood Updated: 07/13/23 0133     Glucose 92 mg/dL      BUN 16 mg/dL      Creatinine 0.83 mg/dL      Sodium 142 mmol/L      Potassium 4.4 mmol/L      Chloride 105 mmol/L      CO2  28.0 mmol/L      Calcium 9.7 mg/dL      BUN/Creatinine Ratio 19.3     Anion Gap 9.0 mmol/L      eGFR 66.6 mL/min/1.73     Narrative:      GFR Normal >60  Chronic Kidney Disease <60  Kidney Failure <15    The GFR formula is only valid for adults with stable renal function between ages 18 and 70.    CBC Auto Differential [[id number]]  (Abnormal) Collected: 07/13/23 0028    Specimen: Blood Updated: 07/13/23 0119     WBC 7.90 10*3/mm3      RBC 4.06 10*6/mm3      Hemoglobin 12.9 g/dL      Hematocrit 38.2 %      MCV 94.2 fL      MCH 31.7 pg      MCHC 33.7 g/dL      RDW 13.2 %      RDW-SD 43.3 fl      MPV 8.0 fL      Platelets 225 10*3/mm3      Neutrophil % 58.8 %      Lymphocyte % 20.4 %      Monocyte % 9.8 %      Eosinophil % 10.4 %      Basophil % 0.6 %      Neutrophils, Absolute 4.60 10*3/mm3      Lymphocytes, Absolute 1.60 10*3/mm3      Monocytes, Absolute 0.80 10*3/mm3      Eosinophils, Absolute 0.80 10*3/mm3      Basophils, Absolute 0.00 10*3/mm3      nRBC 0.0 /100 WBC     Immunofixation, Serum [[id number]] Collected: 07/13/23 0028    Specimen: Blood Updated: 07/13/23 0105    CEA [[id number]] Collected: 07/12/23 1430    Specimen: Blood Updated: 07/12/23 2013     CEA 1.81 ng/mL     Narrative:      CEA Reference Range:    Non Smokers:   Less than 3 ng/mL  Smokers:       Less than 5 ng/mL  Results may be falsely decreased if patient taking Biotin.    Testing Method: Roche [employer] Electrochemiluminescence Immunoassay(ECLIA)  Values obtained with different assay methods or kits cannot be used interchangeably.    Calcium, Ionized [[id number]]  (Abnormal) Collected: 07/12/23 1430    Specimen: Blood Updated: 07/12/23 1506     Ionized Calcium 1.33 mmol/L     Immunoglobulin Free LT Chains Blood [[id number]] Collected: 07/12/23 1430    Specimen: Blood Updated: 07/12/23 1450    Protein Electrophoresis, Total [[id number]] Collected: 07/12/23 1430    Specimen: Blood Updated: 07/12/23 1450            Culture Data:   No results found  for: BLOODCX  No results found for: URINECX  No results found for: RESPCX  No results found for: WOUNDCX  No results found for: STOOLCX  No components found for: BODYFLD    Radiology Data:   Imaging Results (Last 24 Hours)       Procedure Component Value Units Date/Time    CT Chest With Contrast Diagnostic [[id number]] Collected: 07/13/23 1048     Updated: 07/13/23 1102    Narrative:      CT ABDOMEN PELVIS W CONTRAST, CT CHEST W CONTRAST DIAGNOSTIC    Date of Exam: 7/13/2023 10:31 AM EDT    Indication: h/o cecal cancer.    Comparison: CT chest, abdomen and pelvis 8/1/2018.    Technique: Axial CT images were obtained of the abdomen and pelvis following the uneventful intravenous administration of iodinated contrast. Sagittal and coronal reconstructions were performed.  Automated exposure control and iterative reconstruction   methods were used.        Findings:    CHEST: Right pericardial/mediastinal mass has markedly enlarged since 2018. On today's examination, the mass measures 9.8 cm AP oblique by 7.7 cm transverse oblique by 9.4 cm craniocaudal. On the prior examination, by my measurement, it measured 4.1 x   2.7 x 4.7 cm. The majority of the lesion appears cystic/necrotic. There is irregular peripheral soft tissue wall thickening, and thin internal septations are seen. The mass lesion now displaces the heart to the left of midline. However, there is no   definite invasion of the adjacent mediastinal structures.    Coronary artery calcifications are present. No pericardial effusion or pleural effusion is seen. Mild thoracic aortic calcific atherosclerosis. No thoracic aortic aneurysm. No pathologically enlarged lymph nodes are seen within the chest.    There is chronic scarring in the lung apices. There are no suspicious noncalcified pulmonary nodules. Benign calcified nodule is present in the left lower lobe.    There are advanced degenerative changes of both shoulders, right greater than left. No acute or  suspicious osseous abnormalities are identified.        ABDOMEN AND PELVIS: Ascending colectomy with ileocolic anastomosis. No evidence of local recurrence at the anastomotic site. Mild colonic stool burden is present. Uncomplicated diverticular changes are present in the distal colon. There is a moderate to   large stool burden within the rectum. No suspicious large or small bowel soft tissue mass is identified. The stomach is normal.    No suspicious liver lesions are identified. Cholecystectomy changes are present. The spleen is normal. The pancreas is mildly atrophic without focal abnormality. Bilateral renal cysts are present, largest at the right lower pole measuring 5.8 cm. There   are 2 small nonobstructing stones in the right lower renal pole, largest measuring about 5 mm.    Coarse calcification in the left adrenal gland is unchanged from 2018, likely reflecting sequelae of benign etiology such as old granulomatous disease or sequela of prior trauma or infection.    1.9 x 1.2 cm right adrenal nodular thickening is unchanged since 2018, most in keeping with a benign adenoma.    No pathologic enlarged lymph nodes are seen within the abdomen or pelvis. No ascites is seen.    The urinary bladder, uterus, and rectum are within normal limits.    No acute or suspicious osseous abnormality. Lumbar levoscoliosis.          Impression:      1. Large right pericardial/mediastinal mass, 9.8 x 7.7 x 9.4 cm with mixed cystic-solid components. It creates mass effect upon the mediastinum, deviating the heart to the left of midline. The mass has significantly enlarged since 2018. Differential   diagnosis can include but is not limited to thymoma, thymic carcinoma, teratoma, metastatic disease of unknown primary origin. Surgical consultation is advised.  2. No evidence of recurrent malignancy or metastatic disease in the abdomen or pelvis. Partial right hemicolectomy with ileocolic anastomosis.  3. Additional chronic  findings as described above.    Electronically Signed: Sue Guerrero    7/13/2023 11:00 AM EDT    Workstation ID: [id number]    CT Abdomen Pelvis With Contrast [[id number]] Collected: 07/13/23 1048     Updated: 07/13/23 1102    Narrative:      CT ABDOMEN PELVIS W CONTRAST, CT CHEST W CONTRAST DIAGNOSTIC    Date of Exam: 7/13/2023 10:31 AM EDT    Indication: h/o cecal cancer.    Comparison: CT chest, abdomen and pelvis 8/1/2018.    Technique: Axial CT images were obtained of the abdomen and pelvis following the uneventful intravenous administration of iodinated contrast. Sagittal and coronal reconstructions were performed.  Automated exposure control and iterative reconstruction   methods were used.        Findings:    CHEST: Right pericardial/mediastinal mass has markedly enlarged since 2018. On today's examination, the mass measures 9.8 cm AP oblique by 7.7 cm transverse oblique by 9.4 cm craniocaudal. On the prior examination, by my measurement, it measured 4.1 x   2.7 x 4.7 cm. The majority of the lesion appears cystic/necrotic. There is irregular peripheral soft tissue wall thickening, and thin internal septations are seen. The mass lesion now displaces the heart to the left of midline. However, there is no   definite invasion of the adjacent mediastinal structures.    Coronary artery calcifications are present. No pericardial effusion or pleural effusion is seen. Mild thoracic aortic calcific atherosclerosis. No thoracic aortic aneurysm. No pathologically enlarged lymph nodes are seen within the chest.    There is chronic scarring in the lung apices. There are no suspicious noncalcified pulmonary nodules. Benign calcified nodule is present in the left lower lobe.    There are advanced degenerative changes of both shoulders, right greater than left. No acute or suspicious osseous abnormalities are identified.        ABDOMEN AND PELVIS: Ascending colectomy with ileocolic anastomosis. No evidence of local recurrence  at the anastomotic site. Mild colonic stool burden is present. Uncomplicated diverticular changes are present in the distal colon. There is a moderate to   large stool burden within the rectum. No suspicious large or small bowel soft tissue mass is identified. The stomach is normal.    No suspicious liver lesions are identified. Cholecystectomy changes are present. The spleen is normal. The pancreas is mildly atrophic without focal abnormality. Bilateral renal cysts are present, largest at the right lower pole measuring 5.8 cm. There   are 2 small nonobstructing stones in the right lower renal pole, largest measuring about 5 mm.    Coarse calcification in the left adrenal gland is unchanged from 2018, likely reflecting sequelae of benign etiology such as old granulomatous disease or sequela of prior trauma or infection.    1.9 x 1.2 cm right adrenal nodular thickening is unchanged since 2018, most in keeping with a benign adenoma.    No pathologic enlarged lymph nodes are seen within the abdomen or pelvis. No ascites is seen.    The urinary bladder, uterus, and rectum are within normal limits.    No acute or suspicious osseous abnormality. Lumbar levoscoliosis.          Impression:      1. Large right pericardial/mediastinal mass, 9.8 x 7.7 x 9.4 cm with mixed cystic-solid components. It creates mass effect upon the mediastinum, deviating the heart to the left of midline. The mass has significantly enlarged since 2018. Differential   diagnosis can include but is not limited to thymoma, thymic carcinoma, teratoma, metastatic disease of unknown primary origin. Surgical consultation is advised.  2. No evidence of recurrent malignancy or metastatic disease in the abdomen or pelvis. Partial right hemicolectomy with ileocolic anastomosis.  3. Additional chronic findings as described above.    Electronically Signed: Sue Guerrero    7/13/2023 11:00 AM EDT    Workstation ID: [id number]            I have reviewed the patient's  current medications.     Assessment/Plan     Active Hospital Problems    Diagnosis     **Fall     Abnormal CT scan, cervical spine     Nasal bone fracture     Primary hypertension     History of CVA (cerebrovascular accident)     Anemia, iron deficiency     History of colon cancer     Chronic coronary artery disease     History of prosthetic heart valve     Dyslipidemia     Glaucoma      Fall-right frontal scalp hematoma-nasal bone fractures-continue with supportive care.  CT scan showing displaced bilateral nasal bone fracture-continue with pain management and supportive care    Metastatic cancer-oncology is also seeing the patient we will await further recommendation.  Unsure of primary source.    History of CVA-provide with supportive care and continue with statin therapy    Dementia-continue with Aricept.    Failure to thrive-patient do not want feeding tube.  Continue to encourage p.o. intake    DVT prophylaxis-SCD    She is DNR      Lino Manjarrez MD   07/13/23   14:42 EDT

## 2023-07-13 NOTE — PROGRESS NOTES
Hematology/Oncology Inpatient Progress Note    PATIENT NAME: Flori Tubbs  : 1932  MRN: [id number]    CHIEF COMPLAINT: Abnormal cervical spine CT with history of cecal cancer and hypercalcemia; Stage I cecal cancer     HISTORY OF PRESENT ILLNESS:    89 y.o. female presented to [facility] ED on 2023 after falling at her nursing home.  She has dementia and is on a locked patient care unit.  Her son was present and reports she was diagnosed with dementia a year ago.  She recently had a decrease in her performance status and has not been calling him by name.  Most recently she has been mumbling.  CMP was remarkable for calcium of 10.2.  White count 8.9, hemoglobin 13.7, MCV 94.9 and platelets 241,000.  CT head, facial and cervical spine showed no acute intracranial abnormality.  There was a right frontal scalp hematoma laceration but no calvarial fracture.  Moderate generalized volume loss and moderate chronic small vessel ischemic changes were present.  There were comminuted minimally displaced bilateral nasal bone fractures with overlying soft tissue swelling.  There was no acute fracture or malalignment in the cervical spine.  Diffuse heterogeneous lucencies throughout the cervical and upper thoracic spine were present.  Suggestive of diffuse metastatic disease or multiple myeloma.  Moderate multilevel degenerative changes in the cervical spine were present.  Findings were discussed with her son with desire to further evaluate for possible malignancy.  Her CODE STATUS is DNR/DNI.    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  Diagnosed in 2018, treated with right hemicolectomy per Dr. Duffy.  Her tumor was 3.5 cm, 20 lymph nodes were negative for metastasis and margins were negative.  There was invasion but the invasion did not go through the muscularis propria.  MMR was intact.  Colonoscopy on 10/12/2021 by  Dr. Kirkland, showed multiple small polyps that were not resected.  There was bright red blood from moderate to severe left diverticular disease and grade 2 internal hemorrhoids.     07/12/23  Hematology/Oncology was consulted by QUAN Aleman for an abnormal CT cervical spine.     Past Medical History: Cecal cancer 2018.  Bilateral knee OA treated with injections by REKHA Jerome DO.  Diverticulitis and GI bleeding 2021.  Surgical history: Right hemicolectomy 2018.  Colonoscopy 2021.  Cardiac stent placement 2017.  Cholecystectomy years ago.  Social History: She lives at [facility].  She worked minimally outside the home.  No history of smoking or alcohol use.  Family History: Her mother had thyroid cancer.  Allergies: No known allergies.     PCP: Kaye APRN    INTERVAL HISTORY:  7/13/2023 - Ionized calcium 1.33 (1.20-1.30), CEA 1.81 (<3 in non-smoker), Calcium 9.7 (8.2-9.6).    History of present illness reviewed since last visit and changes noted on 07/13/23.    Subjective   Not speaking intelligibly.     ROS:  Review of Systems   Unable to perform ROS: Patient nonverbal      MEDICATIONS:    Scheduled Meds:  docusate sodium, 100 mg, Oral, BID  latanoprost, 1 drop, Both Eyes, Nightly  risperiDONE, 0.25 mg, Oral, Nightly  senna-docusate sodium, 2 tablet, Oral, BID  sodium chloride, 10 mL, Intravenous, Q12H  timolol, 1 drop, Both Eyes, Daily    Continuous Infusions:      PRN Meds:    acetaminophen **OR** acetaminophen **OR** acetaminophen    aluminum-magnesium hydroxide-simethicone    senna-docusate sodium **AND** polyethylene glycol **AND** bisacodyl **AND** bisacodyl    LORazepam    nitroglycerin    ondansetron **OR** ondansetron    [COMPLETED] Insert Peripheral IV **AND** sodium chloride    sodium chloride    sodium chloride    traMADol     ALLERGIES:    Allergies   Allergen Reactions    Epinephrine Unknown (See Comments)    Sulfamethoxazole-Trimethoprim Rash     Objective    VITALS:   BP  "144/81 (BP Location: Left arm, Patient Position: Lying)   Pulse 107   Temp 99.3 °F (37.4 °C) (Axillary)   Resp 14   Ht 167.6 cm (66\")   Wt 51 kg (112 lb 7 oz)   SpO2 97%   BMI 18.15 kg/m²     PHYSICAL EXAM:  Physical Exam  Vitals reviewed.   Constitutional:       General: She is not in acute distress.     Appearance: She is well-developed. She is not diaphoretic.   HENT:      Head: Normocephalic and atraumatic.      Comments: Face is swollen.     Nose:      Comments: Nose is swollen.  Steri-Strip at bridge of nose.     Mouth/Throat:      Pharynx: No oropharyngeal exudate.      Comments: Dental fillings.  Eyes:      Conjunctiva/sclera: Conjunctivae normal.      Comments: Periorbital ecchymosis.   Neck:      Thyroid: No thyromegaly.      Vascular: No JVD.   Cardiovascular:      Rate and Rhythm: Regular rhythm. Tachycardia present.      Heart sounds: Normal heart sounds. No murmur heard.     Comments: Cardiac monitor leads.  Pulmonary:      Effort: Pulmonary effort is normal. No respiratory distress.      Breath sounds: Normal breath sounds.   Abdominal:      General: Bowel sounds are normal.      Palpations: Abdomen is soft.      Tenderness: There is no abdominal tenderness. There is no guarding.   Musculoskeletal:         General: Normal range of motion.      Cervical back: Normal range of motion and neck supple.      Comments: Right hand oxygen saturation monitor.  Left upper extremity peripheral IV.   Skin:     General: Skin is warm and dry.      Findings: No erythema or rash.      Comments: Scab noted to right forehead.   Neurological:      Mental Status: She is alert.      Comments: Patient does not speak intelligibly.  She mumbles.  She responds to physical stimuli and loud voice       RECENT LABS:  Lab Results (last 24 hours)       Procedure Component Value Units Date/Time    Basic Metabolic Panel [[id number]]  (Abnormal) Collected: 07/13/23 0028    Specimen: Blood Updated: 07/13/23 0133     Glucose 92 " mg/dL      BUN 16 mg/dL      Creatinine 0.83 mg/dL      Sodium 142 mmol/L      Potassium 4.4 mmol/L      Chloride 105 mmol/L      CO2 28.0 mmol/L      Calcium 9.7 mg/dL      BUN/Creatinine Ratio 19.3     Anion Gap 9.0 mmol/L      eGFR 66.6 mL/min/1.73     Narrative:      GFR Normal >60  Chronic Kidney Disease <60  Kidney Failure <15    The GFR formula is only valid for adults with stable renal function between ages 18 and 70.    CBC Auto Differential [[id number]]  (Abnormal) Collected: 07/13/23 0028    Specimen: Blood Updated: 07/13/23 0119     WBC 7.90 10*3/mm3      RBC 4.06 10*6/mm3      Hemoglobin 12.9 g/dL      Hematocrit 38.2 %      MCV 94.2 fL      MCH 31.7 pg      MCHC 33.7 g/dL      RDW 13.2 %      RDW-SD 43.3 fl      MPV 8.0 fL      Platelets 225 10*3/mm3      Neutrophil % 58.8 %      Lymphocyte % 20.4 %      Monocyte % 9.8 %      Eosinophil % 10.4 %      Basophil % 0.6 %      Neutrophils, Absolute 4.60 10*3/mm3      Lymphocytes, Absolute 1.60 10*3/mm3      Monocytes, Absolute 0.80 10*3/mm3      Eosinophils, Absolute 0.80 10*3/mm3      Basophils, Absolute 0.00 10*3/mm3      nRBC 0.0 /100 WBC     Immunofixation, Serum [[id number]] Collected: 07/13/23 0028    Specimen: Blood Updated: 07/13/23 0105    CEA [[id number]] Collected: 07/12/23 1430    Specimen: Blood Updated: 07/12/23 2013     CEA 1.81 ng/mL     Narrative:      CEA Reference Range:    Non Smokers:   Less than 3 ng/mL  Smokers:       Less than 5 ng/mL  Results may be falsely decreased if patient taking Biotin.    Testing Method: Roche [employer] Electrochemiluminescence Immunoassay(ECLIA)  Values obtained with different assay methods or kits cannot be used interchangeably.    Calcium, Ionized [[id number]]  (Abnormal) Collected: 07/12/23 1430    Specimen: Blood Updated: 07/12/23 1506     Ionized Calcium 1.33 mmol/L     Immunoglobulin Free LT Chains Blood [[id number]] Collected: 07/12/23 1430    Specimen: Blood Updated: 07/12/23 1450    Protein  Electrophoresis, Total [[id number]] Collected: 07/12/23 1430    Specimen: Blood Updated: 07/12/23 1450          PENDING RESULTS:SPEP, SIFE, light chain ratio, immunoglobulins.    IMAGING REVIEWED:  CT Head Without Contrast    Result Date: 7/12/2023  **This impression includes finding(s) with follow-up recommendations** Head CT: 1.  No acute intracranial abnormality. 2.  Right frontal scalp hematoma and laceration. No calvarial fracture. 3.  Moderate generalized volume loss and moderate chronic small vessel ischemic changes. Facial CT: 1.  Comminuted, minimally displaced bilateral nasal bone fractures with overlying soft tissue swelling. No other acute facial bone fractures. Cervical spine CT: 1.  No acute fracture or malalignment in the cervical spine. 2.  Diffuse heterogeneous lucencies throughout the cervical and upper thoracic spine. Findings suggest possible diffuse metastatic disease or multiple myeloma. Correlate with any known history of malignancy and consider outpatient nuclear medicine bone scan. 3.  Moderate multilevel degenerative changes in the cervical spine as discussed above. Electronically signed by:  Jackson Srivastava M.D.  7/12/2023 1:24 AM Mountain Time    CT Cervical Spine Without Contrast    Result Date: 7/12/2023  **This impression includes finding(s) with follow-up recommendations** Head CT: 1.  No acute intracranial abnormality. 2.  Right frontal scalp hematoma and laceration. No calvarial fracture. 3.  Moderate generalized volume loss and moderate chronic small vessel ischemic changes. Facial CT: 1.  Comminuted, minimally displaced bilateral nasal bone fractures with overlying soft tissue swelling. No other acute facial bone fractures. Cervical spine CT: 1.  No acute fracture or malalignment in the cervical spine. 2.  Diffuse heterogeneous lucencies throughout the cervical and upper thoracic spine. Findings suggest possible diffuse metastatic disease or multiple myeloma. Correlate with any known  history of malignancy and consider outpatient nuclear medicine bone scan. 3.  Moderate multilevel degenerative changes in the cervical spine as discussed above. Electronically signed by:  Jackson Srivastava M.D.  7/12/2023 1:24 AM Mountain Time    CT Facial Bones Without Contrast    Result Date: 7/12/2023  **This impression includes finding(s) with follow-up recommendations** Head CT: 1.  No acute intracranial abnormality. 2.  Right frontal scalp hematoma and laceration. No calvarial fracture. 3.  Moderate generalized volume loss and moderate chronic small vessel ischemic changes. Facial CT: 1.  Comminuted, minimally displaced bilateral nasal bone fractures with overlying soft tissue swelling. No other acute facial bone fractures. Cervical spine CT: 1.  No acute fracture or malalignment in the cervical spine. 2.  Diffuse heterogeneous lucencies throughout the cervical and upper thoracic spine. Findings suggest possible diffuse metastatic disease or multiple myeloma. Correlate with any known history of malignancy and consider outpatient nuclear medicine bone scan. 3.  Moderate multilevel degenerative changes in the cervical spine as discussed above. Electronically signed by:  Jackson Srivastava M.D.  7/12/2023 1:24 AM Mountain Time    XR Hip With or Without Pelvis 2 - 3 View Right    Result Date: 7/12/2023  Impression: 1. Study is limited by diffuse osteopenia. If there is high clinical suspicion additional imaging can be obtained. 2. No definite acute osseous abnormality given limitations of study Electronically Signed: Simon Berg  7/12/2023 7:42 AM EDT  Workstation ID: [id number]     I have reviewed the patient's labs, imaging, reports, and other clinician documentation.    Assessment & Plan   ASSESSMENT  Abnormal cervical spine CT with history of cecal cancer and hypercalcemia-concern for possible multiple myeloma or metastatic disease.  Pending lab evaluation for myeloma and CT C/A/P.  CEA was normal. Ionized calcium  was elevated on admission but calcium is now low. Patient has been on outpatient hospice with [facility]   Stage I cecal cancer-she has never recurred and is up-to-date on her surveillance colonoscopy. CEA normal.   Fall with nasal fracture, osteoarthritis, dementia, and history of CVA-per primary team.     PLAN  CT C/A/P - ordered without po contrast as patient not able to follow directions to drink contrast.   SPEP, SIFE, light chain ratio, immunoglobulins are pending.   Patient may not be able to return to [facility] due to decline in condition.     Electronically signed by QUAN Islas, 07/13/23, 10:31 AM EDT.      I have personally performed a face-to-face diagnostic evaluation on this patient. I have performed a complete history and physical examination, reviewed laboratory studies, and radiographic examinations.  I have completed the majority and substantive portion of the medical decision making.  I have formulated the assessment on this patient and the plan of action as noted above. I have discussed the case with Brittany Hodgson NP, have edited/reviewed the note, and agree with the care plan.  The patient keeps her eyes closed.  She does respond to simple commands but cannot engage in a conversation.  She does have facial swelling and ecchymoses.  CEA is normal.  CT scans have not been performed yet because she was not able to drink contrast.  We will proceed with CTs with IV contrast only.      I discussed the patient's findings and my recommendations with patient.    Part of this note may be an electronic transcription/translation of spoken language to printed text using the Dragon Dictation System.     Electronically signed by Rajinder Clifford MD, 07/13/23, 12:57 PM EDT.

## 2023-07-14 LAB
IGA SERPL-MCNC: 291 MG/DL (ref 64–422)
IGG SERPL-MCNC: 891 MG/DL (ref 586–1602)
IGM SERPL-MCNC: 41 MG/DL (ref 26–217)
PROT PATTERN SERPL IFE-IMP: NORMAL

## 2023-07-14 PROCEDURE — 99223 1ST HOSP IP/OBS HIGH 75: CPT | Performed by: NURSE PRACTITIONER

## 2023-07-14 RX ORDER — SODIUM CHLORIDE 9 MG/ML
50 INJECTION, SOLUTION INTRAVENOUS CONTINUOUS
Status: DISCONTINUED | OUTPATIENT
Start: 2023-07-14 | End: 2023-07-15

## 2023-07-14 RX ADMIN — Medication 10 ML: at 20:24

## 2023-07-14 RX ADMIN — LATANOPROST 1 DROP: 50 SOLUTION/ DROPS OPHTHALMIC at 20:24

## 2023-07-14 RX ADMIN — SODIUM CHLORIDE 50 ML/HR: 9 INJECTION, SOLUTION INTRAVENOUS at 09:54

## 2023-07-14 RX ADMIN — Medication 10 ML: at 08:01

## 2023-07-14 NOTE — CASE MANAGEMENT/SOCIAL WORK
Social Work Assessment  [facility]     Patient Name: Flori Tubbs  MRN: [id number]  Today's Date: 7/14/2023    Admit Date: 7/12/2023     Discharge Plan       Row Name 07/14/23 1242       Plan    Plan Return to [facility] hospital [facility] with [facility]. PASRR approved.             LISA Souza, LSW  Medical Social Worker  Ph [phone]  Fax [phone]  [email or web address]

## 2023-07-14 NOTE — PROGRESS NOTES
[facility] Medicine Services  INPATIENT PROGRESS NOTE    Length of Stay: 0  Date of Admission: 7/12/2023  Primary Care Physician: Kaye Wall APRN    Subjective   Chief Complaint: Generalized weakness  HPI:    Patient has had increasing lethargy and weakness.  She has not been able to be awake enough to eat or drink anything.    Review of Systems   Cardiovascular:  Negative for chest pain.   Gastrointestinal:  Negative for abdominal pain.        All pertinent negatives and positives are as above. All other systems have been reviewed and are negative unless otherwise stated.     Objective    Temp:  [97.5 °F (36.4 °C)-98.6 °F (37 °C)] 98.5 °F (36.9 °C)  Heart Rate:  [70-84] 70  Resp:  [14-17] 17  BP: (115-128)/(75-77) 115/77  Physical Exam  Vitals and nursing note reviewed.   Constitutional:       General: She is not in acute distress.     Appearance: She is well-developed. She is ill-appearing. She is not diaphoretic.   HENT:      Head: Normocephalic and atraumatic.   Cardiovascular:      Rate and Rhythm: Normal rate.   Pulmonary:      Effort: Pulmonary effort is normal. No respiratory distress.      Breath sounds: No wheezing.   Abdominal:      General: There is no distension.      Palpations: Abdomen is soft.   Musculoskeletal:         General: Normal range of motion.   Skin:     General: Skin is warm and dry.   Neurological:      Mental Status: She is alert.      Cranial Nerves: No cranial nerve deficit.   Psychiatric:      Comments: Confusion present           Results Review:  I have reviewed the labs, radiology results, and diagnostic studies.    Laboratory Data:   Lab Results (last 24 hours)       Procedure Component Value Units Date/Time    Protein Electrophoresis, Total [[id number]] Collected: 07/12/23 1430    Specimen: Blood Updated: 07/13/23 1511     Total Protein 6.1 g/dL      Albumin 3.3 g/dL      Alpha-1-Globulin 0.3 g/dL      Alpha-2-Globulin 0.8 g/dL       Beta Globulin 0.9 g/dL      Gamma Globulin 0.9 g/dL      M-Satish Comment: g/dL      Comment: ASYMMETRICAL GAMMA        Globulin 2.8 g/dL      A/G Ratio 1.2     Please note Comment     Comment: Protein electrophoresis scan will follow via computer, mail, or   delivery.       Narrative:      Performed at:  [address] [place] [address]  [id number]  : Tony Baker PhD, Phone:  [phone]    Immunoglobulin Free LT Chains Blood [[id number]]  (Abnormal) Collected: 07/12/23 1430    Specimen: Blood Updated: 07/13/23 1511     Free Light Chain, Kappa 40.0 mg/L      Free Lambda Light Chains 23.1 mg/L      Kappa/Lambda Ratio 1.73    Narrative:      Performed at:  [address]  [id number]  : Tony Baker PhD, Phone:  [phone]            Culture Data:   No results found for: BLOODCX  No results found for: URINECX  No results found for: RESPCX  No results found for: WOUNDCX  No results found for: STOOLCX  No components found for: BODYFLD    Radiology Data:   Imaging Results (Last 24 Hours)       ** No results found for the last 24 hours. **            I have reviewed the patient's current medications.     Assessment/Plan     Active Hospital Problems    Diagnosis     **Fall     Abnormal CT scan, cervical spine     Nasal bone fracture     Primary hypertension     History of CVA (cerebrovascular accident)     Anemia, iron deficiency     History of colon cancer     Chronic coronary artery disease     History of prosthetic heart valve     Dyslipidemia     Glaucoma      Fall-right frontal scalp hematoma-nasal bone fractures-continue with supportive care.  CT scan showing displaced bilateral nasal bone fracture-continue with pain management and supportive care     Metastatic cancer-oncology is also seeing the patient we will await further recommendation.  Unsure of primary source.  Oncology is following, awaiting further results     History  of CVA-provide with supportive care and continue with statin therapy     Dementia-continue with Aricept.     Failure to thrive-patient do not want feeding tube.  Continue to encourage p.o. intake    Disposition-family to decide whether to take home or she will go back to memory care center.     DVT prophylaxis-SCD     She is DNR           Lino Manjarrez MD   07/14/23   12:45 EDT

## 2023-07-14 NOTE — CONSULTS
Inpatient Thoracic Surgery Consult  Consult performed by: Kateryna Bright DNP, APRN  Consult ordered by: Rajinder Clifford MD        Patient Care Team:  Kaye APRN as PCP - General (Nurse Practitioner)  Umberto White MD as PCP - Family Medicine  Tauurs Robles MD as Consulting Physician (Cardiology)  Rajinder Clifford MD as Consulting Physician (Hematology and Oncology)    Chief Complaint   Patient presents with    Fall       Subjective     History of Present Illness    Ms. Tubbs is a 89-year-old lady w with a past medical history as listed below, most notably cecal cancer status post resection in 2018 as well as dementia.  She resides in a memory care unit as she is unable to care for herself.  She presented to [facility] on 7/12/2023 after a fall where she hit her head and face.  She is in a wheelchair at baseline and needs assistance with ADLs.  She is established with [facility] as an outpatient.    CT chest was ordered for further evaluation as the patient had an abnormal cervical spine CT performed on admission which demonstrated concern for multiple myeloma or metastatic disease.  She was found to have an increase in a large right pericardial/mediastinal mass, now 9.8 cm, previously 3.8 cm in 2018 when she was seen by Dr. Vora who recommended continued observation.  Patient was lost to follow-up.  Medical oncology was consulted during this admission and Dr. Clifford has asked us to see her for this finding on her recent imaging.    Review of Systems   Unable to perform ROS: Dementia        Patient Active Problem List   Diagnosis    Chronic pain of both knees    Anaclitic depression    Anemia, iron deficiency    Arthritis    Asthma    Atypical chest pain    Chronic coronary artery disease    History of colon cancer    Complete uterovaginal prolapse    Uterine prolapse    Dyslipidemia    Dysphagia    Dyspnea on exertion    Fatigue    Weakness    Glaucoma     History of prosthetic heart valve    Primary hypertension    Localized infection of skin    Mediastinal mass    Multiple thyroid nodules    Myalgia    Other screening mammogram    Palpitations    Panic attack    Postnasal drip    Sebaceous cyst    Skin disorder    History of CVA (cerebrovascular accident)    Vocal cord paralysis    Pain in joint involving lower leg    Anxiety    Medicare annual wellness visit, subsequent    Urge incontinence of urine    Pressure injury of sacral region, stage 1    Chest wall pain    Right shoulder pain    Memory impairment    Post-menopausal    Acute blood loss anemia    Moderate malnutrition    Fall    Abnormal CT scan, cervical spine    Nasal bone fracture     Past Medical History:   Diagnosis Date    Arthritis     CAD (coronary artery disease)     Colon cancer     COVID-19 2020    CVA (cerebral vascular accident)     Dementia     Difficulty walking     Glaucoma 2012    Hypertension     Knee swelling     Myocardial infarction     Palpitation     TIA (transient ischemic attack)     Weakness      Past Surgical History:   Procedure Laterality Date    CATARACT EXTRACTION       SECTION      COLON SURGERY  2018    COLONOSCOPY N/A 10/12/2021    Procedure: COLONOSCOPY;  Surgeon: Tommy Kirkland MD;  Location: [facility] ENDOSCOPY;  Service: Gastroenterology;  Laterality: N/A;  diverticulosis, lower GI Bleed    MASS EXCISION  colon     Family History   Problem Relation Age of Onset    Cancer Mother         thyroid, breast    Cancer Father         lung, pancreas    Cancer Sister         breast     Social History     Socioeconomic History    Marital status:    Tobacco Use    Smoking status: Never    Smokeless tobacco: Never   Vaping Use    Vaping Use: Never used   Substance and Sexual Activity    Alcohol use: No    Drug use: No    Sexual activity: Not Currently     Partners: Male     Birth control/protection: Post-menopausal     Medications Prior to  Admission   Medication Sig Dispense Refill Last Dose    acetaminophen (TYLENOL) 500 MG tablet Take 1 tablet by mouth 2 (Two) Times a Day.       bimatoprost (LUMIGAN) 0.01 % ophthalmic drops Administer 1 drop to both eyes Every Night.       docusate sodium (COLACE) 100 MG capsule Take 1 capsule by mouth 2 (Two) Times a Day.       fluticasone (FLONASE) 50 MCG/ACT nasal spray 1 spray into the nostril(s) as directed by provider Daily.       polyethylene glycol (MIRALAX) 17 GM/SCOOP powder Take 17 g by mouth Daily.       risperiDONE (risperDAL) 0.25 MG tablet Take 1 tablet by mouth every night at bedtime.       timolol (TIMOPTIC) 0.25 % ophthalmic solution Administer 1 drop to both eyes 2 (two) times a day.       acetaminophen (TYLENOL) 500 MG tablet Take 1 tablet by mouth 2 (Two) Times a Day As Needed for Mild Pain.       hydrocortisone 1 % cream Apply 1 application  topically to the appropriate area as directed 2 (Two) Times a Day As Needed (hemmorhoids). rectum       loratadine (CLARITIN) 10 MG tablet Take 1 tablet by mouth Daily As Needed for Allergies.       LORazepam (ATIVAN) 0.5 MG tablet Take 0.25 mg by mouth Every 8 (Eight) Hours As Needed for Anxiety.       magnesium hydroxide (MILK OF MAGNESIA) 400 MG/5ML suspension Take 30 mL by mouth Daily As Needed for Constipation.       nitroglycerin (NITROSTAT) 0.4 MG SL tablet Place 1 tablet under the tongue Every 5 (Five) Minutes As Needed for Chest Pain. Take no more than 3 doses in 15 minutes.       ondansetron (ZOFRAN) 4 MG tablet Take 1 tablet by mouth Every 6 (Six) Hours As Needed for Nausea or Vomiting.       traMADol (ULTRAM) 50 MG tablet Take 1 tablet by mouth Every 6 (Six) Hours As Needed for Moderate Pain.        Allergies   Allergen Reactions    Epinephrine Unknown (See Comments)    Sulfamethoxazole-Trimethoprim Rash       Objective      Vital Signs  Temp:  [97.5 °F (36.4 °C)-98.6 °F (37 °C)] 98.5 °F (36.9 °C)  Heart Rate:  [70-84] 70  Resp:  [14-17] 17  BP:  (115-128)/(75-77) 115/77    Intake & Output (last day)         07/13 0701  07/14 0700 07/14 0701  07/15 0700    P.O. 50     Total Intake(mL/kg) 50 (1)     Urine (mL/kg/hr) 350 (0.3)     Stool 0     Total Output 350     Net -300           Urine Unmeasured Occurrence 3 x     Stool Unmeasured Occurrence 0 x             Physical Exam  Constitutional:       General: She is not in acute distress.     Appearance: She is ill-appearing.   HENT:      Head: Normocephalic and atraumatic.   Cardiovascular:      Rate and Rhythm: Normal rate.      Pulses: Normal pulses.   Pulmonary:      Effort: Pulmonary effort is normal. No respiratory distress.   Musculoskeletal:      Cervical back: Normal range of motion.   Neurological:      Mental Status: She is lethargic, disoriented and confused.      Cranial Nerves: Cranial nerve deficit present.      Motor: Weakness present.      Comments: Minimally verbal   Psychiatric:         Mood and Affect: Affect is flat.         Cognition and Memory: Memory is impaired.       Results Review:    I reviewed the patient's new clinical results.  I reviewed the patient's new imaging results and agree with the interpretation.  Discussed with patient, RN and Dr. Kam    Imaging Results (Last 24 Hours)       ** No results found for the last 24 hours. **            Lab Results:  Lab Results (last 24 hours)       Procedure Component Value Units Date/Time    Protein Electrophoresis, Total [[id number]] Collected: 07/12/23 1430    Specimen: Blood Updated: 07/13/23 1511     Total Protein 6.1 g/dL      Albumin 3.3 g/dL      Alpha-1-Globulin 0.3 g/dL      Alpha-2-Globulin 0.8 g/dL      Beta Globulin 0.9 g/dL      Gamma Globulin 0.9 g/dL      M-Satish Comment: g/dL      Comment: ASYMMETRICAL GAMMA        Globulin 2.8 g/dL      A/G Ratio 1.2     Please note Comment     Comment: Protein electrophoresis scan will follow via computer, mail, or   delivery.       Narrative:      Performed at:  [address] [place] [address]  [address]  [id number]  : Tony Baker PhD, Phone:  [phone]    Immunoglobulin Free LT Chains Blood [[id number]]  (Abnormal) Collected: 07/12/23 1430    Specimen: Blood Updated: 07/13/23 1511     Free Light Chain, Kappa 40.0 mg/L      Free Lambda Light Chains 23.1 mg/L      Kappa/Lambda Ratio 1.73    Narrative:      Performed at:  [address] [facility]  [id number]  : Tony Baker PhD, Phone:  [phone]                Assessment & Plan       Fall    Anemia, iron deficiency    Chronic coronary artery disease    History of colon cancer    Dyslipidemia    Glaucoma    History of prosthetic heart valve    Primary hypertension    History of CVA (cerebrovascular accident)    Abnormal CT scan, cervical spine    Nasal bone fracture      Assessment & Plan    I have independently reviewed the CT of the chest performed he yesterday, 7/13/2023 which demonstrates interval increase to right pericardial/mediastinal mass, now measuring 9.8 cm, previously 4.1 cm in 2018.  Majority of the lesion appears cystic/necrotic.  There is peripheral soft tissue wall thickening with internal septations.  The lesion displaces the heart to the left.  No suspicious pulmonary nodules or enlarged lymph nodes within the chest.    Mediastinal mass: Obviously concerning for progression of disease versus new malignancy given her history of colon cancer, however  she is not a surgical candidate given her poor performance status and multiple comorbidities including dementia causing her to be unable to care for herself.  She is hemodynamically stable on room air on my assessment today and does not appear to be in any respiratory distress.  The patient would not benefit from being put under a general anesthetic for a mediastinoscopy for tissue sampling.  Agree with palliative care to focus on comfort measures.    I discussed the patients findings and our recommendations  with patient, nursing staff, and Dr. Kam .  No role for thoracic surgery, we will sign off.  Thank you for allowing us to participate in the care of Ms. Tubbs.      Kateryna Bright DNP, APRN  [facility]  07/14/23  13:53 EDT    I spent >75 minutes reviewing the patient's chart including medical history, notes, radiographic imaging, labs and performing an assessment and development of a plan and discussion with the patient/family at bedside.

## 2023-07-14 NOTE — PROGRESS NOTES
.Hematology/Oncology Inpatient Progress Note    PATIENT NAME: Flori Tubbs  : 1932  MRN: [id number]    CHIEF COMPLAINT: Large right pericardial/mediastinal mass, Abnormal cervical spine CT with history of cecal cancer and hypercalcemia; Stage I cecal cancer     HISTORY OF PRESENT ILLNESS:    89 y.o. female presented to [facility] ED on 2023 after falling at her nursing home.  She has dementia and is on a locked patient care unit.  Her son was present and reports she was diagnosed with dementia a year ago.  She recently had a decrease in her performance status and has not been calling him by name.  Most recently she has been mumbling.  CMP was remarkable for calcium of 10.2.  White count 8.9, hemoglobin 13.7, MCV 94.9 and platelets 241,000.  CT head, facial and cervical spine showed no acute intracranial abnormality.  There was a right frontal scalp hematoma laceration but no calvarial fracture.  Moderate generalized volume loss and moderate chronic small vessel ischemic changes were present.  There were comminuted minimally displaced bilateral nasal bone fractures with overlying soft tissue swelling.  There was no acute fracture or malalignment in the cervical spine.  Diffuse heterogeneous lucencies throughout the cervical and upper thoracic spine were present.  Suggestive of diffuse metastatic disease or multiple myeloma.  Moderate multilevel degenerative changes in the cervical spine were present.  Findings were discussed with her son with desire to further evaluate for possible malignancy.  Her CODE STATUS is DNR/DNI.    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  Diagnosed in 2018, treated with right hemicolectomy per Dr. Duffy.  Her tumor was 3.5 cm, 20 lymph nodes were negative for metastasis and margins were negative.  There was invasion but the invasion did not go through the muscularis propria.  MMR  was intact.  Colonoscopy on 10/12/2021 by Dr. Kirkland, showed multiple small polyps that were not resected.  There was bright red blood from moderate to severe left diverticular disease and grade 2 internal hemorrhoids.     07/12/23  Hematology/Oncology was consulted by QUAN Aleman for an abnormal CT cervical spine.     Past Medical History: Cecal cancer 2018.  Bilateral knee OA treated with injections by REKHA Jerome DO.  Diverticulitis and GI bleeding 2021.  Surgical history: Right hemicolectomy 2018.  Colonoscopy 2021.  Cardiac stent placement 2017. Cholecystectomy years ago.  Social History: She lives at [facility].  She worked minimally outside the home.  No history of smoking or alcohol use.  Family History: Her mother had thyroid cancer.  Allergies: No known allergies.     PCP: Kaye Wall APRN    INTERVAL HISTORY:  7/13/2023 - Ionized calcium 1.33 (1.20-1.30), CEA 1.81 (<3 in non-smoker), Calcium 9.7 (8.2-9.6).  7/14/2023 - CT CAP with contrast showed a large right pericardial/mediastinal mass, 9.8 x 7.7 x 9.4 cm with mixed cystic-solid components. It created a mass effect upon the mediastinum, deviating the heart to the left of midline. The mass had significantly enlarged since 2018. Differential diagnosis can include but was not limited to thymoma, thymic carcinoma, teratoma, metastatic disease of unknown primary origin.  No evidence of recurrent malignancy or metastatic disease in the abdomen or pelvis. Partial right hemicolectomy with ileocolic anastomosis. SPEP showed asymmetrical gamma.  Kappa free light chain ratio 1.73, free kappa light chains 40.0 (3.3-19.4), free lambda light chains 23.1 (5.7-26.3).    History of present illness reviewed since last visit and changes noted on 07/14/23.    Subjective   Sleeping. Pt was talking with her grandson earlier.     ROS:  Review of Systems   Unable to perform ROS: Patient nonverbal      MEDICATIONS:    Scheduled Meds:  docusate sodium,  "100 mg, Oral, BID  latanoprost, 1 drop, Both Eyes, Nightly  risperiDONE, 0.25 mg, Oral, Nightly  senna-docusate sodium, 2 tablet, Oral, BID  sodium chloride, 10 mL, Intravenous, Q12H  timolol, 1 drop, Both Eyes, Daily    Continuous Infusions:  sodium chloride, 50 mL/hr, Last Rate: 50 mL/hr (07/14/23 0947)       PRN Meds:    acetaminophen **OR** acetaminophen **OR** acetaminophen    aluminum-magnesium hydroxide-simethicone    senna-docusate sodium **AND** polyethylene glycol **AND** bisacodyl **AND** bisacodyl    LORazepam    nitroglycerin    ondansetron **OR** ondansetron    [COMPLETED] Insert Peripheral IV **AND** sodium chloride    sodium chloride    sodium chloride    traMADol     ALLERGIES:    Allergies   Allergen Reactions    Epinephrine Unknown (See Comments)    Sulfamethoxazole-Trimethoprim Rash     Objective    VITALS:   /75 (BP Location: Left arm, Patient Position: Lying)   Pulse 73   Temp 97.5 °F (36.4 °C) (Axillary)   Resp 14   Ht 167.6 cm (66\")   Wt 51 kg (112 lb 7 oz)   SpO2 96%   BMI 18.15 kg/m²     PHYSICAL EXAM:  Physical Exam  Vitals reviewed.   Constitutional:       General: She is not in acute distress.     Appearance: She is well-developed. She is ill-appearing. She is not diaphoretic.   HENT:      Head: Normocephalic and atraumatic.      Nose:      Comments: Nasal swelling.   Steri-Strip at bridge of nose.     Mouth/Throat:      Pharynx: No oropharyngeal exudate.      Comments: Dental fillings.  Eyes:      Conjunctiva/sclera: Conjunctivae normal.   Neck:      Thyroid: No thyromegaly.      Vascular: No JVD.   Cardiovascular:      Rate and Rhythm: Normal rate and regular rhythm.      Heart sounds: Normal heart sounds. No murmur heard.     Comments: Cardiac monitor leads.  Pulmonary:      Effort: Pulmonary effort is normal. No respiratory distress.      Breath sounds: Normal breath sounds.   Abdominal:      General: Bowel sounds are normal.      Palpations: Abdomen is soft.      " Tenderness: There is no abdominal tenderness. There is no guarding.   Musculoskeletal:         General: Normal range of motion.      Cervical back: Normal range of motion and neck supple.      Comments: Left upper extremity peripheral IV.   Skin:     General: Skin is warm and dry.      Findings: No erythema or rash.      Comments: Ecchymosis noted to face.    Neurological:      Mental Status: She is alert.      Comments: Sleeping during examination. Does respond to stimuli.        RECENT LABS:  Lab Results (last 24 hours)       Procedure Component Value Units Date/Time    Protein Electrophoresis, Total [[id number]] Collected: 07/12/23 1430    Specimen: Blood Updated: 07/13/23 1511     Total Protein 6.1 g/dL      Albumin 3.3 g/dL      Alpha-1-Globulin 0.3 g/dL      Alpha-2-Globulin 0.8 g/dL      Beta Globulin 0.9 g/dL      Gamma Globulin 0.9 g/dL      M-Satish Comment: g/dL      Comment: ASYMMETRICAL GAMMA        Globulin 2.8 g/dL      A/G Ratio 1.2     Please note Comment     Comment: Protein electrophoresis scan will follow via computer, mail, or   delivery.       Narrative:      Performed at:  [address]  [id number]  : Tony Baker PhD, Phone:  [phone]    Immunoglobulin Free LT Chains Blood [[id number]]  (Abnormal) Collected: 07/12/23 1430    Specimen: Blood Updated: 07/13/23 1511     Free Light Chain, Kappa 40.0 mg/L      Free Lambda Light Chains 23.1 mg/L      Kappa/Lambda Ratio 1.73    Narrative:      Performed at:  [address] - [facility]  [id number]  : Tony Baker PhD, Phone:  [phone]          PENDING RESULTS:Immunoglobulins, SIFE.     IMAGING REVIEWED:  CT Chest With Contrast Diagnostic    Result Date: 7/13/2023  1. Large right pericardial/mediastinal mass, 9.8 x 7.7 x 9.4 cm with mixed cystic-solid components. It creates mass effect upon the mediastinum, deviating the heart to the left of midline. The  mass has significantly enlarged since 2018. Differential diagnosis can include but is not limited to thymoma, thymic carcinoma, teratoma, metastatic disease of unknown primary origin. Surgical consultation is advised. 2. No evidence of recurrent malignancy or metastatic disease in the abdomen or pelvis. Partial right hemicolectomy with ileocolic anastomosis. 3. Additional chronic findings as described above. Electronically Signed: Sueenrique Montanocodey  7/13/2023 11:00 AM EDT  Workstation ID: [id number]    CT Abdomen Pelvis With Contrast    Result Date: 7/13/2023  1. Large right pericardial/mediastinal mass, 9.8 x 7.7 x 9.4 cm with mixed cystic-solid components. It creates mass effect upon the mediastinum, deviating the heart to the left of midline. The mass has significantly enlarged since 2018. Differential diagnosis can include but is not limited to thymoma, thymic carcinoma, teratoma, metastatic disease of unknown primary origin. Surgical consultation is advised. 2. No evidence of recurrent malignancy or metastatic disease in the abdomen or pelvis. Partial right hemicolectomy with ileocolic anastomosis. 3. Additional chronic findings as described above. Electronically Signed: Sue Dustycodey  7/13/2023 11:00 AM EDT  Workstation ID: [id number]     I have reviewed the patient's labs, imaging, reports, and other clinician documentation.    Assessment & Plan   ASSESSMENT  Large right pericardial/mediastinal mass, abnormal cervical spine CT with history of cecal cancer and hypercalcemia-concern for possible multiple myeloma or metastatic disease.  Pending lab evaluation for myeloma - SPEP showed asymmetrical gamma, and elevated kappa/lambda ratio. SIFE and immunoglobulins are pending. CT C/A/P showed a large mediastinal/pericardial mass. CEA was normal. Ionized calcium was elevated on admission but calcium is now low.  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  Have had a long discussion with son regarding options of  treatment to include palliative care only versus biopsy of mediastinal mass through mediastinoscopy.  Patient has been on outpatient hospice with [facility].   Stage I cecal cancer-she has never recurred and is up-to-date on her surveillance colonoscopy. CEA normal.   Fall with nasal fracture, osteoarthritis, dementia, and history of CVA-per primary team.     PLAN  SIFE and immunoglobulins are pending.   Thoracic surgery consultation.  Patient may not be able to return to [facility] due to decline in condition.     Electronically signed by QUAN Islas, 07/14/23, 10:51 AM EDT.      I have personally performed a face-to-face diagnostic evaluation on this patient. I have performed a complete history and physical examination, reviewed laboratory studies, and radiographic examinations.  I have completed the majority and substantive portion of the medical decision making.  I have formulated the assessment on this patient and the plan of action as noted above. I have discussed the case with Brittany Hodgson NP, have edited/reviewed the note, and agree with the care plan.  The patient continues to be minimally responsive.  She is apparently having trouble opening her left eye.  CTs have revealed a large right mediastinal mass.  Have discussed this in detail with son.  As smaller mass was present in 2018, this could be a low-grade malignancy like lymphoma, thymoma or teratoma.  Options are biopsy for diagnosis versus palliative care.    I discussed the patient's findings and my recommendations with family.    Part of this note may be an electronic transcription/translation of spoken language to printed text using the Dragon Dictation System.       Electronically signed by Rajinder Clifford MD, 07/14/23, 12:48 PM EDT.

## 2023-07-14 NOTE — CONSULTS
Nutrition Services    Patient Name: Flori Tubbs  YOB: 1932  MRN: [id number]  Admission date: 2023    Comment:    Addition of assistance with meals.    Addition of Boost Plus BID (Provides 720 kcals, 28 g protein if consumed)     Severe chronic disease related malnutrition related to decreased appetite in the setting of dementia and h/o colon cancer s/p resection as evidenced by PO intake meeting less than 75% of estimated energy requirement for greater than or equal to 3 months and severe muscle and fat wasting per NFPE.     See MSA below.    PPE Documentation        PPE Worn By Provider gloves   PPE Worn By Patient  N/A     CLINICAL NUTRITION ASSESSMENT      Reason for Assessment : BMI < 19      H&P      Past Medical History:   Diagnosis Date    Arthritis     CAD (coronary artery disease)     Colon cancer     COVID-19 2020    CVA (cerebral vascular accident)     Dementia     Difficulty walking     Glaucoma 2012    Hypertension     Knee swelling     Myocardial infarction     Palpitation     TIA (transient ischemic attack)     Weakness        Past Surgical History:   Procedure Laterality Date    CATARACT EXTRACTION       SECTION      COLON SURGERY  2018    COLONOSCOPY N/A 10/12/2021    Procedure: COLONOSCOPY;  Surgeon: Tommy Kirkland MD;  Location: [facility] ENDOSCOPY;  Service: Gastroenterology;  Laterality: N/A;  diverticulosis, lower GI Bleed    MASS EXCISION  colon        Current Problems   Fall  R frontal scalp hematoma  Nasal bone fractures    Abnormal cervical spine CT  -possible diffuse metastatic disease or multiple myeloma    H/o CVA  CAD  Dyslipidemia    H/o colon cancer s/p resection    Dementia   -lives in locked memory care unit     Encounter Information        Trending Narrative     : Pt admitted to [facility] s/p fall at memory care unit. Pt with poor PO intake but family has declined feeding tube. Oncology is following for an abnormal CT of cervical  "spine. Pt sleeping at time of RD visit. RD spoke with pt's son at bedside. Pt's son reported pt eating poorly for awhile now. Pt's son said the pt eats finger foods at her nursing facility but otherwise needs assistance with eating. RD will order assistance with meals. Pt slept throughout RD visit but pt's son okay'd NFPE. NFPE completed, consistent with nutrition diagnosis of malnutrition using AND/ASPEN criteria. See MSA below.        Anthropometrics        Current Height, Weight Height: 167.6 cm (66\")  Weight: 51 kg (112 lb 7 oz) (07/12/23 1957)       Ideal Body Weight (IBW) 130#   Usual Body Weight (UBW) Unknown        Trending Weight Hx     This admission: 7/14: 112# - scale              PTA: No recent wt hx to review    Wt Readings from Last 30 Encounters:   07/12/23 1957 51 kg (112 lb 7 oz)   07/12/23 0114 60.8 kg (134 lb)   04/11/23 1626 60.8 kg (134 lb)   10/15/21 0530 60.8 kg (134 lb 0.6 oz)   10/14/21 0502 59.4 kg (130 lb 15.3 oz)   10/13/21 0500 58.1 kg (128 lb 1.4 oz)   10/12/21 0516 59.4 kg (130 lb 15.3 oz)   10/11/21 1940 59.4 kg (130 lb 15.3 oz)   10/11/21 1056 59 kg (130 lb)   09/26/21 2131 58.1 kg (128 lb 1.4 oz)   07/22/21 1107 57.4 kg (126 lb 9.6 oz)   07/02/21 1322 58.1 kg (128 lb)   06/15/21 1003 59 kg (130 lb)   05/17/21 1155 60.3 kg (133 lb)   05/12/21 1235 61.1 kg (134 lb 9.6 oz)   03/11/21 1510 60.8 kg (134 lb)   01/04/21 1126 62.6 kg (138 lb)   11/05/20 1412 66.2 kg (146 lb)   09/24/20 1052 65.8 kg (145 lb)   08/18/20 1341 66.7 kg (147 lb)   07/20/20 0955 65.8 kg (145 lb)   07/09/20 1311 66.7 kg (147 lb)   06/05/20 1027 68 kg (150 lb)   09/24/19 1252 68.9 kg (152 lb)   07/05/19 1546 69.4 kg (153 lb)   06/22/19 2006 68.7 kg (151 lb 7.3 oz)   04/05/19 1203 71.2 kg (157 lb)   11/20/18 0727 69.4 kg (153 lb)   09/21/18 1020 68.8 kg (151 lb 12 oz)   09/18/18 1251 68.5 kg (151 lb)   08/23/18 0749 67.6 kg (149 lb)   08/22/18 1041 67.6 kg (149 lb)   02/08/18 0726 68 kg (150 lb)   02/05/18 1521 67.4 " kg (148 lb 8 oz)   09/15/17 1253 67.6 kg (149 lb)   08/04/17 1235 66.8 kg (147 lb 4 oz)      BMI kg/m2 Body mass index is 18.15 kg/m².       Labs        Pertinent Labs Reviewed, management per attending   Results from last 7 days   Lab Units 07/13/23  0028 07/12/23  0512   SODIUM mmol/L 142 142   POTASSIUM mmol/L 4.4 4.0   CHLORIDE mmol/L 105 105   CO2 mmol/L 28.0 27.0   BUN mg/dL 16 20   CREATININE mg/dL 0.83 0.94   CALCIUM mg/dL 9.7* 10.2*   BILIRUBIN mg/dL  --  0.7   ALK PHOS U/L  --  75   ALT (SGPT) U/L  --  12   AST (SGOT) U/L  --  14   GLUCOSE mg/dL 92 90     Results from last 7 days   Lab Units 07/13/23  0028   HEMOGLOBIN g/dL 12.9   HEMATOCRIT % 38.2     COVID19   Date Value Ref Range Status   10/11/2021 Not Detected Not Detected - Ref. Range Final     Lab Results   Component Value Date    HGBA1C 5.3 06/23/2021        Medications    Scheduled Medications docusate sodium, 100 mg, Oral, BID  latanoprost, 1 drop, Both Eyes, Nightly  risperiDONE, 0.25 mg, Oral, Nightly  senna-docusate sodium, 2 tablet, Oral, BID  sodium chloride, 10 mL, Intravenous, Q12H  timolol, 1 drop, Both Eyes, Daily        Infusions      PRN Medications   acetaminophen **OR** acetaminophen **OR** acetaminophen    aluminum-magnesium hydroxide-simethicone    senna-docusate sodium **AND** polyethylene glycol **AND** bisacodyl **AND** bisacodyl    LORazepam    nitroglycerin    ondansetron **OR** ondansetron    [COMPLETED] Insert Peripheral IV **AND** sodium chloride    sodium chloride    sodium chloride    traMADol     Physical Findings        Trending Physical   Appearance, NFPE 7/14: NFPE completed, consistent with nutrition diagnosis of malnutrition using AND/ASPEN criteria. See MSA below.      --  Edema  None documented      Bowel Function Last documented BM on 7/11     Tubes No feeding tube, pt's family declined      Chewing/Swallowing Soft to chew: chopped meat     Skin No pressure injuries notes      --  Current Nutrition Orders &  Evaluation of Intake       Oral Nutrition     Food Allergies NKFA   Current PO Diet Diet: Regular/House Diet; Texture: Soft to Chew (NDD 3); Soft to Chew: Chopped Meat; Fluid Consistency: Thin (IDDSI 0)   Supplement none   PO Evaluation     Trending % PO Intake 7/14: 25%   --  Nutritional Risk Screening        NRS-2002 Score          Nutrition Diagnosis         Nutrition Dx Problem 1 Severe chronic disease related malnutrition related to decreased appetite in the setting of dementia and h/o colon cancer s/p resection as evidenced by PO intake meeting less than 75% of estimated energy requirement for greater than or equal to 3 months and severe muscle and fat wasting per NFPE.       Nutrition Dx Problem 2        Intervention Goal         Intervention Goal(s) PO intake > 50%, ONS acceptance      Nutrition Intervention        RD Action Addition of Boost Plus BID (Provides 720 kcals, 28 g protein if consumed)        Nutrition Prescription          Diet Prescription Soft to chew: chopped meat   Supplement Prescription Addition of Boost Plus BID (Provides 720 kcals, 28 g protein if consumed)      --  Monitor/Evaluation        Monitor Per protocol, PO intake, Supplement intake, Pertinent labs, Weight     Malnutrition Severity Assessment      Patient meets criteria for : Severe Malnutrition  Malnutrition Type (last 8 hours)       Malnutrition Severity Assessment       Row Name 07/14/23 1322       Malnutrition Severity Assessment    Malnutrition Type Chronic Disease - Related Malnutrition      Row Name 07/14/23 1322       Insufficient Energy Intake     Insufficient Energy Intake Findings Severe    Insufficient Energy Intake  <75% of est. energy requirement for > or equal to 3 months      Row Name 07/14/23 1322       Muscle Loss    Loss of Muscle Mass Findings Severe    [place] Severe - deep hollowing/scooping, lack of muscle to touch, facial bones well defined    Clavicle Bone Region Severe - protruding prominent bone     Acromion Bone Region Severe - squared shoulders, bones, and acromion process protrusion prominent    Scapular Bone Region Severe - prominent bones, depressions easily visible between ribs, scapula, spine, shoulders    Dorsal Hand Region Severe - prominent depression    Patellar Region Severe - prominent bone, square looking, very little muscle definition    Anterior Thigh Region Severe - line/depression along thigh, obviously thin    Posterior Calf Region Severe - thin with very little definition/firmness      Row Name 07/14/23 1322       Fat Loss    Subcutaneous Fat Loss Findings Severe    Orbital Region  Severe - pronounced hollowness/depression, dark circles, loose saggy skin    Upper Arm Region Severe - mostly skin, very little space between folds, fingers touch    Thoracic & Lumbar Region Severe - ribs visible with prominent depressions, iliac crest very prominent      Row Name 07/14/23 1328       Criteria Met (Must meet criteria for severity in at least 2 of these categories: M Wasting, Fat Loss, Fluid, Secondary Signs, Wt. Status, Intake)    Patient meets criteria for  Severe Malnutrition                         Electronically signed by:  Lavern Hudson RD  07/14/23 07:44 EDT

## 2023-07-14 NOTE — PLAN OF CARE
Goal Outcome Evaluation:  Plan of Care Reviewed With: patient        Progress: improving         Pt is only alert to self. Does not follow commands or answer questions. H/o dementia. Pt is on regular diet but pt has not eaten. Pt is incontinent, q2 turn, wound to coccyx. Nutrition following. Pt may not be able to return to [facility] d/t decline in condition. Pt is resting in bed with call bell within reach and family at beside. No s/s of pain or distress at this time.

## 2023-07-14 NOTE — CASE MANAGEMENT/SOCIAL WORK
Continued Stay Note   Stan     Patient Name: Flori Tubbs  MRN: [id number]  Today's Date: 7/14/2023    Admit Date: 7/12/2023    Plan: Return to [facility] with [facility]. PASRR approved if needed.   Discharge Plan       Row Name 07/14/23 1549       Plan    Plan Return to [facility] with [facility]. PASRR approved if needed.    Patient/Family in Agreement with Plan yes    Plan Comments CM met with patient's son Portillo at bedside after receiving update from Dr Manjarrez that family is thinking of taking patient home. Son Portillo is considering taking pt home with him and his wife Pricila but is wanting more info on having a 24/7 caregiver arranged. CM explained that no agency is likely able to provide 24/7 care. CM corresponded with [facility] liaison Tara who has talked to both nayely Coffey and son Portillo today. After further discussions, CM met with nayely Coffey at bedside this afternoon and confirmed plan that pt will return to [facility] with hospice. Reviewed IMM letter, provided copy, and obtained signature. Pt to receive IVF today and probable dc tomorrow. Will need EMS transportation.             Megan Naegele, RN     Office Phone: [phone]  Office Cell: [phone]

## 2023-07-15 LAB
ALBUMIN SERPL-MCNC: 3.2 G/DL (ref 3.5–5.2)
ALBUMIN/GLOB SERPL: 1.1 G/DL
ALP SERPL-CCNC: 55 U/L (ref 39–117)
ALT SERPL W P-5'-P-CCNC: 9 U/L (ref 1–33)
ANION GAP SERPL CALCULATED.3IONS-SCNC: 14 MMOL/L (ref 5–15)
AST SERPL-CCNC: 10 U/L (ref 1–32)
BASOPHILS # BLD AUTO: 0 10*3/MM3 (ref 0–0.2)
BASOPHILS NFR BLD AUTO: 0.4 % (ref 0–1.5)
BILIRUB SERPL-MCNC: 0.7 MG/DL (ref 0–1.2)
BUN SERPL-MCNC: 21 MG/DL (ref 8–23)
BUN/CREAT SERPL: 25.3 (ref 7–25)
CALCIUM SPEC-SCNC: 9.2 MG/DL (ref 8.2–9.6)
CHLORIDE SERPL-SCNC: 106 MMOL/L (ref 98–107)
CO2 SERPL-SCNC: 22 MMOL/L (ref 22–29)
CREAT SERPL-MCNC: 0.83 MG/DL (ref 0.57–1)
DEPRECATED RDW RBC AUTO: 44.2 FL (ref 37–54)
EGFRCR SERPLBLD CKD-EPI 2021: 66.6 ML/MIN/1.73
EOSINOPHIL # BLD AUTO: 0.4 10*3/MM3 (ref 0–0.4)
EOSINOPHIL NFR BLD AUTO: 4.3 % (ref 0.3–6.2)
ERYTHROCYTE [DISTWIDTH] IN BLOOD BY AUTOMATED COUNT: 12.9 % (ref 12.3–15.4)
GLOBULIN UR ELPH-MCNC: 2.9 GM/DL
GLUCOSE BLDC GLUCOMTR-MCNC: 68 MG/DL (ref 70–105)
GLUCOSE BLDC GLUCOMTR-MCNC: 92 MG/DL (ref 70–105)
GLUCOSE SERPL-MCNC: 65 MG/DL (ref 65–99)
HCT VFR BLD AUTO: 37.5 % (ref 34–46.6)
HGB BLD-MCNC: 12.4 G/DL (ref 12–15.9)
LYMPHOCYTES # BLD AUTO: 1.4 10*3/MM3 (ref 0.7–3.1)
LYMPHOCYTES NFR BLD AUTO: 16.8 % (ref 19.6–45.3)
MCH RBC QN AUTO: 30.6 PG (ref 26.6–33)
MCHC RBC AUTO-ENTMCNC: 33.1 G/DL (ref 31.5–35.7)
MCV RBC AUTO: 92.6 FL (ref 79–97)
MONOCYTES # BLD AUTO: 0.8 10*3/MM3 (ref 0.1–0.9)
MONOCYTES NFR BLD AUTO: 9 % (ref 5–12)
NEUTROPHILS NFR BLD AUTO: 5.8 10*3/MM3 (ref 1.7–7)
NEUTROPHILS NFR BLD AUTO: 69.5 % (ref 42.7–76)
NRBC BLD AUTO-RTO: 0.2 /100 WBC (ref 0–0.2)
PLATELET # BLD AUTO: 219 10*3/MM3 (ref 140–450)
PMV BLD AUTO: 8.2 FL (ref 6–12)
POTASSIUM SERPL-SCNC: 4.3 MMOL/L (ref 3.5–5.2)
PROT SERPL-MCNC: 6.1 G/DL (ref 6–8.5)
RBC # BLD AUTO: 4.05 10*6/MM3 (ref 3.77–5.28)
SODIUM SERPL-SCNC: 142 MMOL/L (ref 136–145)
WBC NRBC COR # BLD: 8.4 10*3/MM3 (ref 3.4–10.8)

## 2023-07-15 PROCEDURE — 92610 EVALUATE SWALLOWING FUNCTION: CPT

## 2023-07-15 PROCEDURE — 82948 REAGENT STRIP/BLOOD GLUCOSE: CPT

## 2023-07-15 PROCEDURE — 80053 COMPREHEN METABOLIC PANEL: CPT | Performed by: FAMILY MEDICINE

## 2023-07-15 PROCEDURE — 85025 COMPLETE CBC W/AUTO DIFF WBC: CPT | Performed by: FAMILY MEDICINE

## 2023-07-15 RX ORDER — DEXTROSE MONOHYDRATE, SODIUM CHLORIDE, AND POTASSIUM CHLORIDE 50; 1.49; 4.5 G/1000ML; G/1000ML; G/1000ML
75 INJECTION, SOLUTION INTRAVENOUS CONTINUOUS
Status: DISCONTINUED | OUTPATIENT
Start: 2023-07-15 | End: 2023-07-18 | Stop reason: HOSPADM

## 2023-07-15 RX ADMIN — LATANOPROST 1 DROP: 50 SOLUTION/ DROPS OPHTHALMIC at 20:09

## 2023-07-15 RX ADMIN — POTASSIUM CHLORIDE, DEXTROSE MONOHYDRATE AND SODIUM CHLORIDE 75 ML/HR: 150; 5; 450 INJECTION, SOLUTION INTRAVENOUS at 04:10

## 2023-07-15 RX ADMIN — Medication 10 ML: at 08:56

## 2023-07-15 RX ADMIN — TIMOLOL MALEATE 1 DROP: 2.5 SOLUTION/ DROPS OPHTHALMIC at 08:56

## 2023-07-15 RX ADMIN — POTASSIUM CHLORIDE, DEXTROSE MONOHYDRATE AND SODIUM CHLORIDE 75 ML/HR: 150; 5; 450 INJECTION, SOLUTION INTRAVENOUS at 17:22

## 2023-07-15 NOTE — PROGRESS NOTES
.Hematology/Oncology Inpatient Progress Note    PATIENT NAME: Flori Tubbs  : 1932  MRN: [id number]    CHIEF COMPLAINT: Large right pericardial/mediastinal mass, Abnormal cervical spine CT with history of cecal cancer and hypercalcemia; Stage I cecal cancer     HISTORY OF PRESENT ILLNESS:    89 y.o. female presented to [facility] ED on 2023 after falling at her nursing home.  She has dementia and is on a locked patient care unit.  Her son was present and reports she was diagnosed with dementia a year ago.  She recently had a decrease in her performance status and has not been calling him by name.  Most recently she has been mumbling.  CMP was remarkable for calcium of 10.2.  White count 8.9, hemoglobin 13.7, MCV 94.9 and platelets 241,000.  CT head, facial and cervical spine showed no acute intracranial abnormality.  There was a right frontal scalp hematoma laceration but no calvarial fracture.  Moderate generalized volume loss and moderate chronic small vessel ischemic changes were present.  There were comminuted minimally displaced bilateral nasal bone fractures with overlying soft tissue swelling.  There was no acute fracture or malalignment in the cervical spine.  Diffuse heterogeneous lucencies throughout the cervical and upper thoracic spine were present.  Suggestive of diffuse metastatic disease or multiple myeloma.  Moderate multilevel degenerative changes in the cervical spine were present.  Findings were discussed with her son with desire to further evaluate for possible malignancy.  Her CODE STATUS is DNR/DNI.    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  Diagnosed in 2018, treated with right hemicolectomy per Dr. Duffy.  Her tumor was 3.5 cm, 20 lymph nodes were negative for metastasis and margins were negative.  There was invasion but the invasion did not go through the muscularis propria.  MMR  was intact.  Colonoscopy on 10/12/2021 by Dr. Kirkland, showed multiple small polyps that were not resected.  There was bright red blood from moderate to severe left diverticular disease and grade 2 internal hemorrhoids.     07/12/23  Hematology/Oncology was consulted by QUAN Aleman for an abnormal CT cervical spine.     Past Medical History: Cecal cancer 2018.  Bilateral knee OA treated with injections by REKHA Jerome DO.  Diverticulitis and GI bleeding 2021.  Surgical history: Right hemicolectomy 2018.  Colonoscopy 2021.  Cardiac stent placement 2017. Cholecystectomy years ago.  Social History: She lives at [facility] [place].  She worked minimally outside the home.  No history of smoking or alcohol use.  Family History: Her mother had thyroid cancer.  Allergies: No known allergies.     PCP: Kaye Wall APRN    INTERVAL HISTORY:  7/13/2023 - Ionized calcium 1.33 (1.20-1.30), CEA 1.81 (<3 in non-smoker), Calcium 9.7 (8.2-9.6).  7/14/2023 - CT CAP with contrast showed a large right pericardial/mediastinal mass, 9.8 x 7.7 x 9.4 cm with mixed cystic-solid components. It created a mass effect upon the mediastinum, deviating the heart to the left of midline. The mass had significantly enlarged since 2018. Differential diagnosis can include but was not limited to thymoma, thymic carcinoma, teratoma, metastatic disease of unknown primary origin.  No evidence of recurrent malignancy or metastatic disease in the abdomen or pelvis. Partial right hemicolectomy with ileocolic anastomosis. SPEP showed asymmetrical gamma.  FLC ratio 1.73, free kappa light chains 40.0 (3.3-19.4), free lambda light chains 23.1 (5.7-26.3).  7/15/2023-SIFE showed unclear presence of monoclonal protein.  IgA 291 (), IgG 891 [id number]), IgM 41 ().  Surgery did not recommend biopsy secondary to comorbidities including dementia with inability to care for herself.    History of present illness reviewed since last visit and  "changes noted on 07/15/23.    Subjective   Could not obtain subjective.  Patient difficult to wake.  Staff reported patient is n.p.o. pending swallow study.    ROS:  Review of Systems   Unable to perform ROS: Patient nonverbal      MEDICATIONS:    Scheduled Meds:  docusate sodium, 100 mg, Oral, BID  latanoprost, 1 drop, Both Eyes, Nightly  risperiDONE, 0.25 mg, Oral, Nightly  senna-docusate sodium, 2 tablet, Oral, BID  sodium chloride, 10 mL, Intravenous, Q12H  timolol, 1 drop, Both Eyes, Daily    Continuous Infusions:  dextrose 5 % and sodium chloride 0.45 % with KCl 20 mEq/L, 75 mL/hr, Last Rate: 75 mL/hr (07/15/23 0410)       PRN Meds:    acetaminophen **OR** acetaminophen **OR** acetaminophen    aluminum-magnesium hydroxide-simethicone    senna-docusate sodium **AND** polyethylene glycol **AND** bisacodyl **AND** bisacodyl    LORazepam    nitroglycerin    ondansetron **OR** ondansetron    [COMPLETED] Insert Peripheral IV **AND** sodium chloride    sodium chloride    sodium chloride    traMADol     ALLERGIES:    Allergies   Allergen Reactions    Epinephrine Unknown (See Comments)    Sulfamethoxazole-Trimethoprim Rash     Objective    VITALS:   /78 (BP Location: Right arm, Patient Position: Lying)   Pulse 89   Temp 98.4 °F (36.9 °C) (Axillary)   Resp 14   Ht 167.6 cm (66\")   Wt 51 kg (112 lb 7 oz)   SpO2 97%   BMI 18.15 kg/m²     PHYSICAL EXAM:  Physical Exam  Vitals and nursing note reviewed.   Constitutional:       General: She is not in acute distress.     Appearance: She is well-developed. She is ill-appearing. She is not diaphoretic.   HENT:      Head: Normocephalic and atraumatic.      Comments: Abrasion to mid forehead     Nose:      Comments: Nose is swollen with Steri-Strips on bridge of nose     Mouth/Throat:      Pharynx: Oropharynx is clear. No oropharyngeal exudate.      Comments: Dental fillings.  Eyes:      Conjunctiva/sclera: Conjunctivae normal.      Comments: periorbital ecchymosis " present   Neck:      Thyroid: No thyromegaly.      Vascular: No JVD.   Cardiovascular:      Rate and Rhythm: Normal rate and regular rhythm.      Heart sounds: Normal heart sounds. No murmur heard.     Comments: Cardiac monitor leads.  Pulmonary:      Effort: Pulmonary effort is normal. No respiratory distress.      Breath sounds: Normal breath sounds.   Abdominal:      General: Bowel sounds are normal.      Palpations: Abdomen is soft.      Tenderness: There is no abdominal tenderness. There is no guarding.   Musculoskeletal:         General: Normal range of motion.      Cervical back: Normal range of motion and neck supple.      Comments: Left upper extremity peripheral IV.   Skin:     General: Skin is warm and dry.      Findings: Bruising present. No erythema or rash.   Neurological:      Comments: Difficult to wake patient   Psychiatric:      Comments: Unable to assess       RECENT LABS:  Lab Results (last 24 hours)       Procedure Component Value Units Date/Time    POC Glucose Once [[id number]]  (Normal) Collected: 07/15/23 0733    Specimen: Blood Updated: 07/15/23 0735     Glucose 92 mg/dL      Comment: Serial Number: [id number]:  [id number]       POC Glucose Once [[id number]]  (Abnormal) Collected: 07/15/23 0346    Specimen: Blood Updated: 07/15/23 0347     Glucose 68 mg/dL      Comment: Serial Number: [id number]:  [id number]       Comprehensive Metabolic Panel [[id number]]  (Abnormal) Collected: 07/15/23 0256    Specimen: Blood Updated: 07/15/23 0342     Glucose 65 mg/dL      BUN 21 mg/dL      Creatinine 0.83 mg/dL      Sodium 142 mmol/L      Potassium 4.3 mmol/L      Chloride 106 mmol/L      CO2 22.0 mmol/L      Calcium 9.2 mg/dL      Total Protein 6.1 g/dL      Albumin 3.2 g/dL      ALT (SGPT) 9 U/L      AST (SGOT) 10 U/L      Alkaline Phosphatase 55 U/L      Total Bilirubin 0.7 mg/dL      Globulin 2.9 gm/dL      A/G Ratio 1.1 g/dL      BUN/Creatinine Ratio 25.3     Anion Gap 14.0 mmol/L      eGFR  66.6 mL/min/1.73     Narrative:      GFR Normal >60  Chronic Kidney Disease <60  Kidney Failure <15    The GFR formula is only valid for adults with stable renal function between ages 18 and 70.    CBC & Differential [[id number]]  (Abnormal) Collected: 07/15/23 0256    Specimen: Blood Updated: 07/15/23 0313    Narrative:      The following orders were created for panel order CBC & Differential.  Procedure                               Abnormality         Status                     ---------                               -----------         ------                     CBC Auto Differential[[id number]]        Abnormal            Final result                 Please view results for these tests on the individual orders.    CBC Auto Differential [[id number]]  (Abnormal) Collected: 07/15/23 0256    Specimen: Blood Updated: 07/15/23 0313     WBC 8.40 10*3/mm3      RBC 4.05 10*6/mm3      Hemoglobin 12.4 g/dL      Hematocrit 37.5 %      MCV 92.6 fL      MCH 30.6 pg      MCHC 33.1 g/dL      RDW 12.9 %      RDW-SD 44.2 fl      MPV 8.2 fL      Platelets 219 10*3/mm3      Neutrophil % 69.5 %      Lymphocyte % 16.8 %      Monocyte % 9.0 %      Eosinophil % 4.3 %      Basophil % 0.4 %      Neutrophils, Absolute 5.80 10*3/mm3      Lymphocytes, Absolute 1.40 10*3/mm3      Monocytes, Absolute 0.80 10*3/mm3      Eosinophils, Absolute 0.40 10*3/mm3      Basophils, Absolute 0.00 10*3/mm3      nRBC 0.2 /100 WBC     Immunofixation, Serum [[id number]] Collected: 07/13/23 0028    Specimen: Blood Updated: 07/14/23 1613     Immunofixation Result, Serum Comment:     Comment: Presence of monoclonal protein is unclear at this time. Suggest  repeat in 3 to 6 months if clinically indicated.        IgG 891 mg/dL      IgA 291 mg/dL      IgM 41 mg/dL     Narrative:      Performed at:  [address] [place] [address]  [id number]  : Tony Baker PhD, Phone:  [phone]          PENDING RESULTS: None    IMAGING REVIEWED:  CT  Chest With Contrast Diagnostic    Result Date: 7/13/2023  1. Large right pericardial/mediastinal mass, 9.8 x 7.7 x 9.4 cm with mixed cystic-solid components. It creates mass effect upon the mediastinum, deviating the heart to the left of midline. The mass has significantly enlarged since 2018. Differential diagnosis can include but is not limited to thymoma, thymic carcinoma, teratoma, metastatic disease of unknown primary origin. Surgical consultation is advised. 2. No evidence of recurrent malignancy or metastatic disease in the abdomen or pelvis. Partial right hemicolectomy with ileocolic anastomosis. 3. Additional chronic findings as described above. Electronically Signed: Sue Guerrero  7/13/2023 11:00 AM EDT  Workstation ID: [id number]    CT Abdomen Pelvis With Contrast    Result Date: 7/13/2023  1. Large right pericardial/mediastinal mass, 9.8 x 7.7 x 9.4 cm with mixed cystic-solid components. It creates mass effect upon the mediastinum, deviating the heart to the left of midline. The mass has significantly enlarged since 2018. Differential diagnosis can include but is not limited to thymoma, thymic carcinoma, teratoma, metastatic disease of unknown primary origin. Surgical consultation is advised. 2. No evidence of recurrent malignancy or metastatic disease in the abdomen or pelvis. Partial right hemicolectomy with ileocolic anastomosis. 3. Additional chronic findings as described above. Electronically Signed: Sue Dustycodey  7/13/2023 11:00 AM EDT  Workstation ID: [id number]     I have reviewed the patient's labs, imaging, reports, and other clinician documentation.    Assessment & Plan   ASSESSMENT  Large right pericardial/mediastinal mass, abnormal cervical spine CT with history of cecal cancer and hypercalcemia-concern for possible multiple myeloma or metastatic disease.  SPEP showed asymmetrical gamma, and elevated kappa/lambda ratio. SIFE findings are unclear and immunoglobulins are normal. CT C/A/P showed  a large mediastinal/pericardial mass. CEA was normal. Ionized calcium was elevated on admission but calcium is now low.  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  Have had a long discussion with son regarding options of treatment to include palliative care only versus biopsy of mediastinal mass through mediastinoscopy.  Seen by thoracic surgery with biopsy not recommended secondary to comorbidities.  Patient has been on outpatient hospice with [facility].   Stage I cecal cancer-she has never recurred and is up-to-date on her surveillance colonoscopy. CEA normal.   Fall with nasal fracture, osteoarthritis, dementia, and history of CVA-per primary team.  Hypoglycemia/lethargy-n.p.o. status likely contributing.  Per primary team.     PLAN  Plan to [facility] with [facility].     Note prepared by DUKE Garcia.  Patient seen and examined by Rajinder Clifford MD.  Electronically signed by QUAN Alas, 07/15/23, 10:01 AM EDT.        I have personally performed a face-to-face diagnostic evaluation on this patient. I have performed a complete history and physical examination, reviewed laboratory studies, and radiographic examinations.  I have completed the majority and substantive portion of the medical decision making.  I have formulated the assessment on this patient and the plan of action as noted above. I have discussed the case with Jasmeet Trotter NP, have edited/reviewed the note, and agree with the care plan.  The patient has apparently not been eating or drinking much.  On examination she is hard to arouse.  Immunoglobulin levels are normal.  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  CTS did not want to proceed with biopsy given the comorbidities.  Would proceed with comfort care only.    I discussed the patient's findings and my recommendations with family.    Part of this note may be an electronic  transcription/translation of spoken language to printed text using the Dragon Dictation System.     Electronically signed by Rajinder Clifford MD, 07/15/23, 10:12 AM EDT.

## 2023-07-15 NOTE — NURSING NOTE
Placed call to provider as chemistry called and stated her blood glucose is currently 65.  Pt is currently NPO awaiting slp evaluation after failing a bedside swallow study r/t AMS.  Awaiting return call from Dr. Davidson.

## 2023-07-15 NOTE — THERAPY EVALUATION
Acute Care - Speech Language Pathology   Swallow Initial Evaluation  Stan     Patient Name: Flori Tubbs  : 1932  MRN: [id number]  Today's Date: 7/15/2023               Admit Date: 2023    Visit Dx:     ICD-10-CM ICD-9-CM   1. Fall, initial encounter  W19.XXXA E888.9   2. Closed fracture of nasal bone, initial encounter  S02.2XXA 802.0   3. Abnormal CT scan, neck  R93.89 793.99     Patient Active Problem List   Diagnosis    Chronic pain of both knees    Anaclitic depression    Anemia, iron deficiency    Arthritis    Asthma    Atypical chest pain    Chronic coronary artery disease    History of colon cancer    Complete uterovaginal prolapse    Uterine prolapse    Dyslipidemia    Dysphagia    Dyspnea on exertion    Fatigue    Weakness    Glaucoma    History of prosthetic heart valve    Primary hypertension    Localized infection of skin    Mediastinal mass    Multiple thyroid nodules    Myalgia    Other screening mammogram    Palpitations    Panic attack    Postnasal drip    Sebaceous cyst    Skin disorder    History of CVA (cerebrovascular accident)    Vocal cord paralysis    Pain in joint involving lower leg    Anxiety    Medicare annual wellness visit, subsequent    Urge incontinence of urine    Pressure injury of sacral region, stage 1    Chest wall pain    Right shoulder pain    Memory impairment    Post-menopausal    Acute blood loss anemia    Moderate malnutrition    Fall    Abnormal CT scan, cervical spine    Nasal bone fracture     Past Medical History:   Diagnosis Date    Arthritis     CAD (coronary artery disease)     Colon cancer     COVID-19 2020    CVA (cerebral vascular accident)     Dementia     Difficulty walking     Glaucoma 2012    Hypertension     Knee swelling     Myocardial infarction     Palpitation     TIA (transient ischemic attack)     Weakness      Past Surgical History:   Procedure Laterality Date    CATARACT EXTRACTION       SECTION      COLON SURGERY   08/2018    COLONOSCOPY N/A 10/12/2021    Procedure: COLONOSCOPY;  Surgeon: Tommy Kirkland MD;  Location: [facility] ENDOSCOPY;  Service: Gastroenterology;  Laterality: N/A;  diverticulosis, lower GI Bleed    MASS EXCISION  colon       SLP Recommendation and Plan  Discussed plan of care with son and daughter. No PO diet recommended at this time d/t poor oral phase and inability to maintain alertness for long enough to sustain meaningful nutrition. Plan to re-evaluate at bedside with additional recommendations pending pt clinical improvement and progress.      Recommend: NPO with temporary non-oral means of nutrition/hydration/medication administration.      Pt may have minimal ice chips/thin water by tsp per [place] Water Protocol (see guidelines below).               The rationale to recommend water when a PO diet cannot appropriately/functionally sustain nutrition (or if thickened liquids are prescribed due to aspiration of thin liquids) is because water is low risk for aspiration pna when compared to aspiration of food or other liquids.    Benefits of a water protocol include but are not limited to:      Oral gratification   Engagement of oropharyngeal swallow musculature   Decrease likelihood of dehydration       Guidelines for proper implementation include:  Waiting a minimum of 30 minutes after consuming any other PO   Thorough oral care prior to consuming water  Upright at 90 degree hip flexion  Small sips at slow rate  Monitor for any changes in respiratory status and discontinue if distress noted     The [place] Free Water Protocol is a research based protocol established in 1984.      SWALLOW EVALUATION (last 72 hours)       SLP Adult Swallow Evaluation       Row Name 07/15/23 1200       Rehab Evaluation    Document Type evaluation  -AB    Subjective Information no complaints  -AB    Patient Observations lethargic  -AB    Patient/Family/Caregiver Comments/Observations seen in 363, son and daughter at  "bedside  -AB    Patient Effort good  -AB    Symptoms Noted During/After Treatment none  -AB       General Information    Patient Profile Reviewed yes  -AB    Pertinent History Of Current Problem 89-year-old lady w with a past medical history as listed below, most notably cecal cancer status post resection in 2018 as well as dementia.  She resides in a memory care unit as she is unable to care for herself.  She presented to [facility] on 7/12/2023 after a fall where she hit her head and face.  She is in a wheelchair at baseline and needs assistance with ADLs.  She is established with [facility] as an outpatient.     CT chest was ordered for further evaluation as the patient had an abnormal cervical spine CT performed on admission which demonstrated concern for multiple myeloma or metastatic disease.  She was found to have an increase in a large right pericardial/mediastinal mass, now 9.8 cm, previously 3.8 cm in 2018 when she was seen by Dr. Vora who recommended continued observation.  -AB    Current Method of Nutrition NPO  -AB    Precautions/Limitations, Vision WFL  -AB    Precautions/Limitations, Hearing WFL  -AB    Prior Level of Function-Communication cognitive-linguistic impairment  dementia  -AB    Prior Level of Function-Swallowing regular textures;thin liquids;other (see comments)  \"finger foods\"  -AB    Plans/Goals Discussed with patient and family  -AB    Barriers to Rehab previous functional deficit;cognitive status  -AB    Patient's Goals for Discharge patient could not state  -AB    Family Goals for Discharge patient able to return to PO diet  -AB       Oral Motor Structure and Function    Oral Lesions or Structural Abnormalities and/or variants none  -AB    Dentition Assessment missing teeth  -AB    Secretion Management dried secretions in oral cavity  -AB    Mucosal Quality sticky;dry  -AB    Gag Response absent or diminished  -AB    Volitional Swallow delayed  -AB    " "Volitional Cough unable to elicit  -AB       Oral Musculature and Cranial Nerve Assessment    Oral Motor General Assessment generalized oral motor weakness  -AB       General Eating/Swallowing Observations    Respiratory Support Currently in Use room air  -AB    Eating/Swallowing Skills fed by SLP  -AB    Positioning During Eating upright 90 degree  -AB    Utensils Used spoon;straw;cup  -AB    Consistencies Trialed ice chips;thin liquids;pureed  -AB       Clinical Swallow Eval    Clinical Swallow Evaluation Summary Bedside swallow evaluation completed. SLP orders received in setting of failed swallow screen, d/t 3oz water portion. Previous regular/thin diet from 7/12-7.14 this admit, reported limited intake d/t lethargy. Son reports pt consumes \"finger foods\" at baseline. Daughter reports pt with frequent coughing/throat clear with PO in last month. Was previously receiving OP hospice services at [facility], but those services revoked, family is NOT interested in feeding tube.Pt requires maximal cues to maintain alertness, eyes are closed throughout. Able to follow only 50% of all commands, increased latency for all responses. Vocal quality with poor intensity and pitch breaks. Dried secretions removed from palate, gag reflex diminished. Anterior spillage with all trials by tsp and cup. Pt unable to pull from straw effectively. Holds 1/2 tsp puree bolus in anterior oral cavity, multiple thin washes to clear. Cues not effective in oral puree clearance. No overt s/s aspiration, however visualization and palpation of swallow may suggest delay up to 10 seconds versus bolus holding. Unable to elicit cued cough. Question increase in wet vocal quality, difficult to ascertain d/t inconsistent verbalizations.     Discussed plan of care with son and daughter. No PO diet recommended at this time d/t poor oral phase and inability to maintain alertness for long enough to sustain meaningful nutrition. Plan to re-evaluate at bedside with " additional recommendations pending pt clinical improvement and progress.     Recommend: NPO with temporary non-oral means of nutrition/hydration/medication administration.     Pt may have minimal ice chips/thin water by tsp per [place] Water Protocol (see guidelines below).  -AB       SLP Evaluation Clinical Impression    SLP Swallowing Diagnosis oral dysphagia;suspected pharyngeal dysphagia  -AB    Functional Impact risk of aspiration/pneumonia;risk of malnutrition;risk of dehydration  -AB    Rehab Potential/Prognosis, Swallowing re-evaluate goals as necessary  -AB    Swallow Criteria for Skilled Therapeutic Interventions Met demonstrates skilled criteria  -AB       SLP Treatment Clinical Impressions    Care Plan Review evaluation/treatment results reviewed;care plan/treatment goals reviewed;risks/benefits reviewed;current/potential barriers reviewed;patient/other agree to care plan  -AB    Care Plan Review, Other Participant(s) son;daughter  -AB       Recommendations    Therapy Frequency (Swallow) PRN  -AB    Predicted Duration Therapy Intervention (Days) until discharge  -AB    SLP Diet Recommendation NPO;water between meals after oral care, with supervision;ice chips between meals after oral care, with supervision  -AB    Recommended Diagnostics reassess via clinical swallow evaluation  -AB    Recommended Precautions and Strategies general aspiration precautions  -AB    Oral Care Recommendations Before ice/water;Oral Care BID/PRN  -AB    SLP Rec. for Method of Medication Administration meds via alternate route  -AB    Monitor for Signs of Aspiration yes  -AB    Anticipated Discharge Disposition (SLP) unknown  -AB       Swallow Goals (SLP)    Swallow LTGs Swallow Long Term Goal (free text)  -AB    Swallow STGs diet tolerance goal selection (SLP)  -AB    Diet Tolerance Goal Selection (SLP) Patient will tolerate trials of  -AB       (LTG) Swallow    (LTG) Swallow pt will improve functional outcome measure score  from fois level II (nothing by mouth, minimal attempts at food) to fois level IV or higher (total oral intake of single consistency)  -AB    [place] (Swallow Long Term Goal) with 1:1 assist/ supervision  -AB    Time Frame (Swallow Long Term Goal) by discharge  -AB    Progress/Outcomes (Swallow Long Term Goal) new goal  -AB       (STG) Patient will tolerate trials of    Consistencies Trialed (Tolerate trials) thin liquids;nectar/ mildly thick liquids;honey/ moderately thick liquids;pureed textures  -AB    Desired Outcome (Tolerate trials) without signs/symptoms of aspiration;with adequate oral prep/transit/clearance;without signs of distress  -AB    [place] (Tolerate trials) with 1:1 assist/ supervision  -AB    Time Frame (Tolerate trials) 1 week  -AB    Progress/Outcomes (Tolerate trials) new goal  -AB              User Key  (r) = Recorded By, (t) = Taken By, (c) = Cosigned By      Initials Name Effective Dates    Stephanie Aguirre, MS CCC-SLP 12/27/22 -                     EDUCATION  The patient has been educated in the following areas:   Dysphagia (Swallowing Impairment) Oral Care/Hydration NPO rationale Modified Diet Instruction.        SLP GOALS       Row Name 07/15/23 1200             (LTG) Swallow    (LTG) Swallow pt will improve functional outcome measure score from fois level II (nothing by mouth, minimal attempts at food) to fois level IV or higher (total oral intake of single consistency)  -AB      [place] (Swallow Long Term Goal) with 1:1 assist/ supervision  -AB      Time Frame (Swallow Long Term Goal) by discharge  -AB      Progress/Outcomes (Swallow Long Term Goal) new goal  -AB         (STG) Patient will tolerate trials of    Consistencies Trialed (Tolerate trials) thin liquids;nectar/ mildly thick liquids;honey/ moderately thick liquids;pureed textures  -AB      Desired Outcome (Tolerate trials) without signs/symptoms of aspiration;with adequate oral prep/transit/clearance;without  signs of distress  -AB      [place] (Tolerate trials) with 1:1 assist/ supervision  -AB      Time Frame (Tolerate trials) 1 week  -AB      Progress/Outcomes (Tolerate trials) new goal  -AB                User Key  (r) = Recorded By, (t) = Taken By, (c) = Cosigned By      Initials Name Provider Type    AB Stephanie Giraldo, MS CCC-SLP Speech and Language Pathologist                       Time Calculation:       Therapy Charges for Today       Code Description Service Date Service Provider Modifiers Qty    [id number]  ST EVAL ORAL PHARYNG SWALLOW 4 7/15/2023 Stephanie Giraldo, MS CCC-SLP GN 1                 Stephanie Giraldo MS CCC-SLP  7/15/2023

## 2023-07-15 NOTE — PROGRESS NOTES
[facility] Medicine Services  INPATIENT PROGRESS NOTE    Length of Stay: 1  Date of Admission: 7/12/2023  Primary Care Physician: Kaye Wall APRN    Subjective   Chief Complaint: Generalized weakness  HPI:  Pt noted weak and tire. Family at bed side. Pt in no pain or discomfort.    Review of Systems   Cardiovascular:  Negative for chest pain.   Gastrointestinal:  Negative for abdominal pain.        All pertinent negatives and positives are as above. All other systems have been reviewed and are negative unless otherwise stated.     Objective    Temp:  [98.4 °F (36.9 °C)-98.6 °F (37 °C)] 98.4 °F (36.9 °C)  Heart Rate:  [81-93] 81  Resp:  [14-16] 14  BP: (131-163)/(73-94) 158/94  Physical Exam  Vitals and nursing note reviewed.   Constitutional:       General: She is not in acute distress.     Appearance: She is well-developed. She is ill-appearing. She is not diaphoretic.   HENT:      Head: Normocephalic and atraumatic.   Cardiovascular:      Rate and Rhythm: Normal rate.   Pulmonary:      Effort: Pulmonary effort is normal. No respiratory distress.      Breath sounds: No wheezing.   Abdominal:      General: There is no distension.      Palpations: Abdomen is soft.   Musculoskeletal:         General: Normal range of motion.   Skin:     General: Skin is warm and dry.   Neurological:      Mental Status: She is alert.      Cranial Nerves: No cranial nerve deficit.   Psychiatric:      Comments: Confusion present           Results Review:  I have reviewed the labs, radiology results, and diagnostic studies.    Laboratory Data:   Lab Results (last 24 hours)       Procedure Component Value Units Date/Time    POC Glucose Once [[id number]]  (Normal) Collected: 07/15/23 0733    Specimen: Blood Updated: 07/15/23 0735     Glucose 92 mg/dL      Comment: Serial Number: [id number]:  [id number]       POC Glucose Once [[id number]]  (Abnormal) Collected: 07/15/23 0346    Specimen:  Blood Updated: 07/15/23 0347     Glucose 68 mg/dL      Comment: Serial Number: [id number]:  [id number]       Comprehensive Metabolic Panel [[id number]]  (Abnormal) Collected: 07/15/23 0256    Specimen: Blood Updated: 07/15/23 0342     Glucose 65 mg/dL      BUN 21 mg/dL      Creatinine 0.83 mg/dL      Sodium 142 mmol/L      Potassium 4.3 mmol/L      Chloride 106 mmol/L      CO2 22.0 mmol/L      Calcium 9.2 mg/dL      Total Protein 6.1 g/dL      Albumin 3.2 g/dL      ALT (SGPT) 9 U/L      AST (SGOT) 10 U/L      Alkaline Phosphatase 55 U/L      Total Bilirubin 0.7 mg/dL      Globulin 2.9 gm/dL      A/G Ratio 1.1 g/dL      BUN/Creatinine Ratio 25.3     Anion Gap 14.0 mmol/L      eGFR 66.6 mL/min/1.73     Narrative:      GFR Normal >60  Chronic Kidney Disease <60  Kidney Failure <15    The GFR formula is only valid for adults with stable renal function between ages 18 and 70.    CBC & Differential [[id number]]  (Abnormal) Collected: 07/15/23 0256    Specimen: Blood Updated: 07/15/23 0313    Narrative:      The following orders were created for panel order CBC & Differential.  Procedure                               Abnormality         Status                     ---------                               -----------         ------                     CBC Auto Differential[[id number]]        Abnormal            Final result                 Please view results for these tests on the individual orders.    CBC Auto Differential [[id number]]  (Abnormal) Collected: 07/15/23 0256    Specimen: Blood Updated: 07/15/23 0313     WBC 8.40 10*3/mm3      RBC 4.05 10*6/mm3      Hemoglobin 12.4 g/dL      Hematocrit 37.5 %      MCV 92.6 fL      MCH 30.6 pg      MCHC 33.1 g/dL      RDW 12.9 %      RDW-SD 44.2 fl      MPV 8.2 fL      Platelets 219 10*3/mm3      Neutrophil % 69.5 %      Lymphocyte % 16.8 %      Monocyte % 9.0 %      Eosinophil % 4.3 %      Basophil % 0.4 %      Neutrophils, Absolute 5.80 10*3/mm3      Lymphocytes, Absolute  1.40 10*3/mm3      Monocytes, Absolute 0.80 10*3/mm3      Eosinophils, Absolute 0.40 10*3/mm3      Basophils, Absolute 0.00 10*3/mm3      nRBC 0.2 /100 WBC     Immunofixation, Serum [[id number]] Collected: 07/13/23 0028    Specimen: Blood Updated: 07/14/23 1613     Immunofixation Result, Serum Comment:     Comment: Presence of monoclonal protein is unclear at this time. Suggest  repeat in 3 to 6 months if clinically indicated.        IgG 891 mg/dL      IgA 291 mg/dL      IgM 41 mg/dL     Narrative:      Performed at:  [address]  [id number]  : Tony Baker PhD, Phone:  [phone]            Culture Data:   No results found for: BLOODCX  No results found for: URINECX  No results found for: RESPCX  No results found for: WOUNDCX  No results found for: STOOLCX  No components found for: BODYFLD    Radiology Data:   Imaging Results (Last 24 Hours)       ** No results found for the last 24 hours. **            I have reviewed the patient's current medications.     Assessment/Plan     Active Hospital Problems    Diagnosis     **Fall     Abnormal CT scan, cervical spine     Nasal bone fracture     Primary hypertension     History of CVA (cerebrovascular accident)     Anemia, iron deficiency     History of colon cancer     Chronic coronary artery disease     History of prosthetic heart valve     Dyslipidemia     Glaucoma      Fall-right frontal scalp hematoma-nasal bone fractures-continue with supportive care.  CT scan showing displaced bilateral nasal bone fracture-continue with pain management and supportive care     Metastatic cancer-oncology on board, will follow there recommendation.     History of CVA-provide with supportive care and continue with statin therapy     Dementia-continue with Aricept.     Failure to thrive-patient do not want feeding tube.  Continue to encourage p.o. intake    Disposition-family to decide whether to take home or she will go back to  [facility].     DVT prophylaxis-SCD     She is DNR           Agueda Batista MD   07/15/23   13:11 EDT

## 2023-07-15 NOTE — PLAN OF CARE
Goal Outcome Evaluation:  Plan of Care Reviewed With: patient        Progress: no change  Outcome Evaluation: Patient shows no s/s of distress and vitals are stable. Pt voiced no concerns. Patient would not wake up enought to answer majority of questions but was able to let me know she was alert and oriented to self only. Q2 turn. Bed alarm on and call light within reach. Will continue to observe.

## 2023-07-15 NOTE — PLAN OF CARE
"Goal Outcome Evaluation:                 Bedside swallow evaluation completed. SLP orders received in setting of failed swallow screen, d/t 3oz water portion. Previous regular/thin diet from 7/12-7.14 this admit, reported limited intake d/t lethargy. Son reports pt consumes \"finger foods\" at baseline. Daughter reports pt with frequent coughing/throat clear with PO in last month. Was previously receiving OP hospice services at [facility], but those services revoked, family is NOT interested in feeding tube.Pt requires maximal cues to maintain alertness, eyes are closed throughout. Able to follow only 50% of all commands, increased latency for all responses. Vocal quality with poor intensity and pitch breaks. Dried secretions removed from palate, gag reflex diminished. Anterior spillage with all trials by tsp and cup. Pt unable to pull from straw effectively. Holds 1/2 tsp puree bolus in anterior oral cavity, multiple thin washes to clear. Cues not effective in oral puree clearance. No overt s/s aspiration, however visualization and palpation of swallow may suggest delay up to 10 seconds versus bolus holding. Unable to elicit cued cough. Question increase in wet vocal quality, difficult to ascertain d/t inconsistent verbalizations.     Discussed plan of care with son and daughter. No PO diet recommended at this time d/t poor oral phase and inability to maintain alertness for long enough to sustain meaningful nutrition. Plan to re-evaluate at bedside with additional recommendations pending pt clinical improvement and progress.     Recommend: NPO with temporary non-oral means of nutrition/hydration/medication administration.     Pt may have minimal ice chips/thin water by tsp per [place] Water Protocol (see guidelines below).           The rationale to recommend water when a PO diet cannot appropriately/functionally sustain nutrition (or if thickened liquids are prescribed due to aspiration of thin liquids) is because water is " low risk for aspiration pna when compared to aspiration of food or other liquids.    Benefits of a water protocol include but are not limited to:     Oral gratification   Engagement of oropharyngeal swallow musculature   Decrease likelihood of dehydration      Guidelines for proper implementation include:  Waiting a minimum of 30 minutes after consuming any other PO   Thorough oral care prior to consuming water  Upright at 90 degree hip flexion  Small sips at slow rate  Monitor for any changes in respiratory status and discontinue if distress noted    The [place] Free Water Protocol is a research based protocol established in 1984.

## 2023-07-16 PROCEDURE — 87102 FUNGUS ISOLATION CULTURE: CPT | Performed by: FAMILY MEDICINE

## 2023-07-16 PROCEDURE — 92526 ORAL FUNCTION THERAPY: CPT

## 2023-07-16 RX ORDER — MORPHINE SULFATE 2 MG/ML
1 INJECTION, SOLUTION INTRAMUSCULAR; INTRAVENOUS 4 TIMES DAILY PRN
Status: DISCONTINUED | OUTPATIENT
Start: 2023-07-16 | End: 2023-07-18 | Stop reason: HOSPADM

## 2023-07-16 RX ADMIN — RISPERIDONE 0.25 MG: 0.25 TABLET, FILM COATED ORAL at 20:10

## 2023-07-16 RX ADMIN — SENNOSIDES AND DOCUSATE SODIUM 2 TABLET: 50; 8.6 TABLET ORAL at 20:10

## 2023-07-16 RX ADMIN — TIMOLOL MALEATE 1 DROP: 2.5 SOLUTION/ DROPS OPHTHALMIC at 07:30

## 2023-07-16 RX ADMIN — POTASSIUM CHLORIDE, DEXTROSE MONOHYDRATE AND SODIUM CHLORIDE 75 ML/HR: 150; 5; 450 INJECTION, SOLUTION INTRAVENOUS at 06:23

## 2023-07-16 RX ADMIN — POTASSIUM CHLORIDE, DEXTROSE MONOHYDRATE AND SODIUM CHLORIDE 75 ML/HR: 150; 5; 450 INJECTION, SOLUTION INTRAVENOUS at 23:45

## 2023-07-16 RX ADMIN — Medication 10 ML: at 07:30

## 2023-07-16 RX ADMIN — LATANOPROST 1 DROP: 50 SOLUTION/ DROPS OPHTHALMIC at 20:10

## 2023-07-16 NOTE — PLAN OF CARE
"Goal Outcome Evaluation:   Pt was seen for re-eval of swallow. Pt was more alert and responsive today. Pt able to open eyes, look at family and verbally respond. She had periods of keeping her eyes closed but remained responsive. Pt was brought fully upright in bed and given trials of water by spoon, applesauce and pudding. Water by straw was attempted but pt unable to pull water from the straw. Pt able to pull water and puree from the spoon. oral transit slow, with intermittant bolus holding. Pt needed cues to swallow at times with applesauce, but no cues needed for pudding, as pt's family reports that pt \"likes sweets\". Discussed with family that pt appears to have no difficulty swallowing liquids by spoon and puree, but questioned if pt can get adequate nutrition due to dec alertness and slow oral transit. Pt's family would like to try to feed pt at this time.      is rec that pt initiate a puree diet with thin liquids by spoon only. Pt should be awake and alert for all PO and be a full feed. Pt should be fed at a slow rate and clear oral cavity between each bite. Monitor closely for s/s of aspiration or decreased alertness and discontinue PO if pt shows these s/s or is unable to remain alert. Pt's family educated on all above recs and they verbalized understanding. ST will follow to assure tolerance of diet and make further recs/upgrades as indicated.                      "

## 2023-07-16 NOTE — PROGRESS NOTES
.Hematology/Oncology Inpatient Progress Note    PATIENT NAME: Flori Tubbs  : 1932  MRN: [id number]    CHIEF COMPLAINT: Large right pericardial/mediastinal mass, Abnormal cervical spine CT with history of cecal cancer and hypercalcemia; Stage I cecal cancer     HISTORY OF PRESENT ILLNESS:    89 y.o. female presented to [facility] ED on 2023 after falling at her nursing home.  She has dementia and is on a locked patient care unit.  Her son was present and reports she was diagnosed with dementia a year ago.  She recently had a decrease in her performance status and has not been calling him by name.  Most recently she has been mumbling.  CMP was remarkable for calcium of 10.2.  White count 8.9, hemoglobin 13.7, MCV 94.9 and platelets 241,000.  CT head, facial and cervical spine showed no acute intracranial abnormality.  There was a right frontal scalp hematoma laceration but no calvarial fracture.  Moderate generalized volume loss and moderate chronic small vessel ischemic changes were present.  There were comminuted minimally displaced bilateral nasal bone fractures with overlying soft tissue swelling.  There was no acute fracture or malalignment in the cervical spine.  Diffuse heterogeneous lucencies throughout the cervical and upper thoracic spine were present.  Suggestive of diffuse metastatic disease or multiple myeloma.  Moderate multilevel degenerative changes in the cervical spine were present.  Findings were discussed with her son with desire to further evaluate for possible malignancy.  Her CODE STATUS is DNR/DNI.    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  Diagnosed in 2018, treated with right hemicolectomy per Dr. Duffy.  Her tumor was 3.5 cm, 20 lymph nodes were negative for metastasis and margins were negative.  There was invasion but the invasion did not go through the muscularis propria.  MMR  was intact.  Colonoscopy on 10/12/2021 by Dr. Kirkland, showed multiple small polyps that were not resected.  There was bright red blood from moderate to severe left diverticular disease and grade 2 internal hemorrhoids.     07/12/23  Hematology/Oncology was consulted by QUAN Aleman for an abnormal CT cervical spine.     Past Medical History: Cecal cancer 2018.  Bilateral knee OA treated with injections by REKHA Jerome DO.  Diverticulitis and GI bleeding 2021.  Surgical history: Right hemicolectomy 2018.  Colonoscopy 2021.  Cardiac stent placement 2017. Cholecystectomy years ago.  Social History: She lives at [facility].  She worked minimally outside the home.  No history of smoking or alcohol use.  Family History: Her mother had thyroid cancer.  Allergies: No known allergies.     PCP: Kaye Wall APRN    INTERVAL HISTORY:  7/13/2023 - Ionized calcium 1.33 (1.20-1.30), CEA 1.81 (<3 in non-smoker), Calcium 9.7 (8.2-9.6).  7/14/2023 - CT CAP with contrast showed a large right pericardial/mediastinal mass, 9.8 x 7.7 x 9.4 cm with mixed cystic-solid components. It created a mass effect upon the mediastinum, deviating the heart to the left of midline. The mass had significantly enlarged since 2018. Differential diagnosis can include but was not limited to thymoma, thymic carcinoma, teratoma, metastatic disease of unknown primary origin.  No evidence of recurrent malignancy or metastatic disease in the abdomen or pelvis. Partial right hemicolectomy with ileocolic anastomosis. SPEP showed asymmetrical gamma.  FLC ratio 1.73, free kappa light chains 40.0 (3.3-19.4), free lambda light chains 23.1 (5.7-26.3).  7/15/2023-SIFE showed unclear presence of monoclonal protein.  IgA 291 (), IgG 891 ([id number]), IgM 41 ().  Surgery did not recommend biopsy secondary to comorbidities including dementia with inability to care for herself.  7/16/2023-more alert.    History of present illness reviewed  since last visit and changes noted on 07/16/23.    Subjective   denies problems.    ROS:  Review of Systems   Constitutional:  Negative for activity change, chills, fatigue, fever and unexpected weight change.   HENT:  Negative for congestion, dental problem, hearing loss, mouth sores, nosebleeds, sore throat and trouble swallowing.    Eyes:  Negative for photophobia and visual disturbance.   Respiratory:  Negative for cough, chest tightness and shortness of breath.    Cardiovascular:  Negative for chest pain, palpitations and leg swelling.   Gastrointestinal:  Negative for abdominal distention, abdominal pain, blood in stool, constipation, diarrhea, nausea and vomiting.   Endocrine: Negative for cold intolerance and heat intolerance.   Genitourinary:  Negative for decreased urine volume, difficulty urinating, frequency, hematuria and urgency.   Musculoskeletal:  Negative for arthralgias and gait problem.   Skin:  Negative for rash and wound.   Neurological:  Negative for dizziness, tremors, weakness, light-headedness, numbness and headaches.   Hematological:  Negative for adenopathy. Does not bruise/bleed easily.   Psychiatric/Behavioral:  Positive for confusion. Negative for hallucinations. The patient is not nervous/anxious.    All other systems reviewed and are negative.     MEDICATIONS:    Scheduled Meds:  docusate sodium, 100 mg, Oral, BID  latanoprost, 1 drop, Both Eyes, Nightly  risperiDONE, 0.25 mg, Oral, Nightly  senna-docusate sodium, 2 tablet, Oral, BID  sodium chloride, 10 mL, Intravenous, Q12H  timolol, 1 drop, Both Eyes, Daily    Continuous Infusions:  dextrose 5 % and sodium chloride 0.45 % with KCl 20 mEq/L, 75 mL/hr, Last Rate: 75 mL/hr (07/16/23 0623)       PRN Meds:    acetaminophen **OR** acetaminophen **OR** acetaminophen    aluminum-magnesium hydroxide-simethicone    senna-docusate sodium **AND** polyethylene glycol **AND** bisacodyl **AND** bisacodyl    LORazepam    nitroglycerin     "ondansetron **OR** ondansetron    [COMPLETED] Insert Peripheral IV **AND** sodium chloride    sodium chloride    sodium chloride    traMADol     ALLERGIES:    Allergies   Allergen Reactions    Epinephrine Unknown (See Comments)    Sulfamethoxazole-Trimethoprim Rash     Objective    VITALS:   /91 (BP Location: Left arm, Patient Position: Lying)   Pulse 82   Temp 98.5 °F (36.9 °C) (Axillary)   Resp 14   Ht 167.6 cm (66\")   Wt 51 kg (112 lb 7 oz)   SpO2 94%   BMI 18.15 kg/m²     PHYSICAL EXAM:  Physical Exam  Vitals and nursing note reviewed.   Constitutional:       General: She is not in acute distress.     Appearance: She is well-developed. She is ill-appearing. She is not diaphoretic.   HENT:      Head: Normocephalic and atraumatic.      Comments: Abrasion to mid forehead     Nose:      Comments: Nose is swollen with Steri-Strips on bridge of nose     Mouth/Throat:      Pharynx: Oropharynx is clear. No oropharyngeal exudate.      Comments: Dental fillings.  Eyes:      Conjunctiva/sclera: Conjunctivae normal.      Comments: periorbital ecchymosis present   Neck:      Thyroid: No thyromegaly.      Vascular: No JVD.   Cardiovascular:      Rate and Rhythm: Normal rate and regular rhythm.      Heart sounds: Normal heart sounds. No murmur heard.     Comments: Cardiac monitor leads.  Pulmonary:      Effort: Pulmonary effort is normal. No respiratory distress.      Breath sounds: Normal breath sounds.   Abdominal:      General: Bowel sounds are normal.      Palpations: Abdomen is soft.      Tenderness: There is no abdominal tenderness. There is no guarding.   Musculoskeletal:         General: Normal range of motion.      Cervical back: Normal range of motion and neck supple.      Comments: Left upper extremity peripheral IV.   Skin:     General: Skin is warm and dry.      Findings: Bruising present. No erythema or rash.   Neurological:      General: No focal deficit present.      Comments: Oriented x1 "   Psychiatric:      Comments: Unable to assess       RECENT LABS:  Lab Results (last 24 hours)       Procedure Component Value Units Date/Time    CANDIDA AURIS SCREEN - Swab, Axilla Right, Axilla Left and Groin [[id number]] Collected: 07/16/23 0303    Specimen: Swab from Axilla Right, Axilla Left and Groin Updated: 07/16/23 0322          PENDING RESULTS: None    IMAGING REVIEWED:  No radiology results for the last day    I have reviewed the patient's labs, imaging, reports, and other clinician documentation.    Assessment & Plan   ASSESSMENT  Large right pericardial/mediastinal mass, abnormal cervical spine CT with history of cecal cancer and hypercalcemia-concern for possible multiple myeloma or metastatic disease.  SPEP showed asymmetrical gamma, and elevated kappa/lambda ratio. SIFE findings are unclear and immunoglobulins are normal. CT C/A/P showed a large mediastinal/pericardial mass. CEA was normal. Ionized calcium was elevated on admission but calcium is now low.  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  Have had a long discussion with son regarding options of treatment to include palliative care only versus biopsy of mediastinal mass through mediastinoscopy.  Seen by thoracic surgery with biopsy not recommended secondary to comorbidities.  Patient has been on outpatient hospice with [facility].   Stage I cecal cancer-she has never recurred and is up-to-date on her surveillance colonoscopy. CEA normal.   Fall with nasal fracture, osteoarthritis, dementia, and history of CVA-per primary team.  Hypoglycemia/lethargy-  Per primary team.     PLAN  Plan to [facility] with [facility].             I discussed the patient's findings and my recommendations with patient and nursing.    Part of this note may be an electronic transcription/translation of spoken language to printed text using the Dragon Dictation System.     Electronically signed by Rajinder MACDONALD  MD Venessa, 07/16/23, 9:34 AM EDT.

## 2023-07-16 NOTE — PLAN OF CARE
Goal Outcome Evaluation:  Plan of Care Reviewed With: daughter, son           Outcome Evaluation: Patient c/o right hip pain early in shift-repositioned & helped. Q2 turn with external cath in place. Mepilex changed due to being soiled from urine. New IV placed with ivf infusing. Patient is a&o to self only. Slept on & off most of shift. Oral care provided with q2 turns. Daughter stayed at bedside until 9pm. Son will be back in am. Patient DNR/DNI to dc back [facility].

## 2023-07-16 NOTE — PROGRESS NOTES
[facility] Medicine Services  INPATIENT PROGRESS NOTE    Length of Stay: 2  Date of Admission: 7/12/2023  Primary Care Physician: Kaye Wall APRN    Subjective   Chief Complaint: Generalized weakness  HPI:  Pt noted comfortable, in no respiratory or other distress. Pt do have generalized weakness.    Review of Systems   Cardiovascular:  Negative for chest pain.   Gastrointestinal:  Negative for abdominal pain.        All pertinent negatives and positives are as above. All other systems have been reviewed and are negative unless otherwise stated.     Objective    Temp:  [98.4 °F (36.9 °C)-98.8 °F (37.1 °C)] 98.5 °F (36.9 °C)  Heart Rate:  [81-90] 82  Resp:  [14] 14  BP: (143-158)/(91-94) 154/91  Physical Exam  Vitals and nursing note reviewed.   Constitutional:       General: She is not in acute distress.     Appearance: She is well-developed. She is ill-appearing. She is not diaphoretic.   HENT:      Head: Normocephalic and atraumatic.   Cardiovascular:      Rate and Rhythm: Normal rate.   Pulmonary:      Effort: Pulmonary effort is normal. No respiratory distress.      Breath sounds: No wheezing.   Abdominal:      General: There is no distension.      Palpations: Abdomen is soft.   Musculoskeletal:         General: Normal range of motion.   Skin:     General: Skin is warm and dry.   Neurological:      Mental Status: She is alert.      Cranial Nerves: No cranial nerve deficit.   Psychiatric:      Comments: Confusion present           Results Review:  I have reviewed the labs, radiology results, and diagnostic studies.    Laboratory Data:   Lab Results (last 24 hours)       Procedure Component Value Units Date/Time    CANDIDA AURIS SCREEN - Swab, Axilla Right, Axilla Left and Groin [[id number]] Collected: 07/16/23 0303    Specimen: Swab from Axilla Right, Axilla Left and Groin Updated: 07/16/23 0322            Culture Data:   No results found for: BLOODCX  No results  found for: URINECX  No results found for: RESPCX  No results found for: WOUNDCX  No results found for: STOOLCX  No components found for: BODYFLD    Radiology Data:   Imaging Results (Last 24 Hours)       ** No results found for the last 24 hours. **            I have reviewed the patient's current medications.     Assessment/Plan     Active Hospital Problems    Diagnosis     **Fall     Abnormal CT scan, cervical spine     Nasal bone fracture     Primary hypertension     History of CVA (cerebrovascular accident)     Anemia, iron deficiency     History of colon cancer     Chronic coronary artery disease     History of prosthetic heart valve     Dyslipidemia     Glaucoma      Fall-right frontal scalp hematoma-nasal bone fractures-continue with supportive care.  CT scan showing displaced bilateral nasal bone fracture-continue with pain management and supportive care     Metastatic cancer-oncology on board, will follow there recommendation.     History of CVA-provide with supportive care and continue with statin therapy     Dementia-continue with Aricept.     Failure to thrive-patient do not want feeding tube.  Continue to encourage p.o. intake    Disposition-family to decide whether to take home or she will go back to memory care center.  Pt DNR ... oncology service advising hospice.      DVT prophylaxis-SCD     She is DNR           Agueda Batista MD   07/16/23   09:56 EDT

## 2023-07-16 NOTE — PLAN OF CARE
Goal Outcome Evaluation:  Plan of Care Reviewed With: patient        Progress: no change  Outcome Evaluation: Patient shows no s/s of distress and vitals are stable. Pt voiced concerns of pain and was repositioned and relieved pain. Speech advanced diet to pureed diet with feed assist. Q2 turn. Bed alarm on and call light within reach. Will continue to observe.

## 2023-07-16 NOTE — THERAPY RE-EVALUATION
Acute Care - Speech Language Pathology   Swallow Re-Evaluation  Stan     Patient Name: Flori Tubbs  : 1932  MRN: [id number]  Today's Date: 2023               Admit Date: 2023    Visit Dx:     ICD-10-CM ICD-9-CM   1. Fall, initial encounter  W19.XXXA E888.9   2. Closed fracture of nasal bone, initial encounter  S02.2XXA 802.0   3. Abnormal CT scan, neck  R93.89 793.99     Patient Active Problem List   Diagnosis    Chronic pain of both knees    Anaclitic depression    Anemia, iron deficiency    Arthritis    Asthma    Atypical chest pain    Chronic coronary artery disease    History of colon cancer    Complete uterovaginal prolapse    Uterine prolapse    Dyslipidemia    Dysphagia    Dyspnea on exertion    Fatigue    Weakness    Glaucoma    History of prosthetic heart valve    Primary hypertension    Localized infection of skin    Mediastinal mass    Multiple thyroid nodules    Myalgia    Other screening mammogram    Palpitations    Panic attack    Postnasal drip    Sebaceous cyst    Skin disorder    History of CVA (cerebrovascular accident)    Vocal cord paralysis    Pain in joint involving lower leg    Anxiety    Medicare annual wellness visit, subsequent    Urge incontinence of urine    Pressure injury of sacral region, stage 1    Chest wall pain    Right shoulder pain    Memory impairment    Post-menopausal    Acute blood loss anemia    Moderate malnutrition    Fall    Abnormal CT scan, cervical spine    Nasal bone fracture     Past Medical History:   Diagnosis Date    Arthritis     CAD (coronary artery disease)     Colon cancer     COVID-19 2020    CVA (cerebral vascular accident)     Dementia     Difficulty walking     Glaucoma 2012    Hypertension     Knee swelling     Myocardial infarction     Palpitation     TIA (transient ischemic attack)     Weakness      Past Surgical History:   Procedure Laterality Date    CATARACT EXTRACTION       SECTION      COLON SURGERY   "08/2018    COLONOSCOPY N/A 10/12/2021    Procedure: COLONOSCOPY;  Surgeon: Tommy Kirkland MD;  [facility]: [facility] ENDOSCOPY;  Service: Gastroenterology;  Laterality: N/A;  diverticulosis, lower GI Bleed    MASS EXCISION  colon       SLP Recommendation and Plan                                                                                   SWALLOW EVALUATION (last 72 hours)       SLP Adult Swallow Evaluation       Row Name 07/16/23 8429       Rehab Evaluation    Document Type re-evaluation  -CP    Subjective Information no complaints  -CP    Patient Observations cooperative  -CP    Patient/Family/Caregiver Comments/Observations Many family members present in room.  -CP    Patient Effort good  -CP    Comment Pt was seen for re-eval of swallow. Pt was more alert and responsive today. Pt able to open eyes, look at family and verbally respond. She had periods of keeping her eyes closed but remained responsive. Pt was brought fully upright in bed and given trials of water by spoon, applesauce and pudding. Water by straw was attempted but pt unable to pull water from the straw. Pt able to pull water and puree from the spoon. oral transit slow, with intermittant bolus holding. Pt needed cues to swallow at times with applesauce, but no cues needed for pudding, as pt's family reports that pt \"likes sweets\". Discussed with family that pt appears to have no difficulty swallowing liquids by spoon and puree, but questioned if pt can get adequate nutrition due to dec alertness and slow oral transit. Pt's family would like to try to feed pt at this time. It is rec that pt initiate a puree diet with thin liquids by spoon only. Pt should be awake and alert for all PO and be a full feed. Pt should be fed at a slow rate and clear oral cavity between each bite. Monitor closely for s/s of aspiration or decreased alertness and discontinue PO if pt shows these s/s or is unable to remain alert. Pt's family educated on all above " recs and they verbalized understanding. ST will follow to assure tolerance of diet and make further recs/upgrades as indicated.  -CP              User Key  (r) = Recorded By, (t) = Taken By, (c) = Cosigned By      Initials Name Effective Dates    CP Sanna Guzmán, SLP 06/16/21 -                     EDUCATION  The patient has been educated in the following areas:   Dysphagia (Swallowing Impairment) Oral Care/Hydration Modified Diet Instruction.        SLP GOALS       Row Name 07/16/23 1800 07/15/23 1200          (LTG) Swallow    (LTG) Swallow Pt will maximize swallow function for least restrictive PO diet, exhibiting no complication associated with dysphagia, adequate PO intake, and demonstrating independent use of swallow compensations  -CP pt will improve functional outcome measure score from fois level II (nothing by mouth, minimal attempts at food) to fois level IV or higher (total oral intake of single consistency)  -AB     Amelia (Swallow Long Term Goal) with 1:1 assist/ supervision  -CP with 1:1 assist/ supervision  -AB     Time Frame (Swallow Long Term Goal) by discharge  -CP by discharge  -AB     Barriers (Swallow Long Term Goal) Dec alertness  -CP --     Progress/Outcomes (Swallow Long Term Goal) good progress toward goal  -CP new goal  -AB     Comment (Swallow Long Term Goal) See above  -CP --        (STG) Patient will tolerate trials of    Consistencies Trialed (Tolerate trials) thin liquids;nectar/ mildly thick liquids;honey/ moderately thick liquids;pureed textures  -CP thin liquids;nectar/ mildly thick liquids;honey/ moderately thick liquids;pureed textures  -AB     Desired Outcome (Tolerate trials) without signs/symptoms of aspiration;with adequate oral prep/transit/clearance;without signs of distress  -CP without signs/symptoms of aspiration;with adequate oral prep/transit/clearance;without signs of distress  -AB     Amelia (Tolerate trials) with 1:1 assist/ supervision  -CP with 1:1  assist/ supervision  -AB     Time Frame (Tolerate trials) 1 week  -CP 1 week  -AB     Progress/Outcomes (Tolerate trials) good progress toward goal  -CP new goal  -AB     Comment (Tolerate trials) See above  -CP --        (STG) Swallow 1    (STG) Swallow 1 The patient will participate in a meal/follow-up assessment to determine safety and adequacy of recommended diet, independent use of safe swallow compensations, pt/family education and additional goals/recommendations to follow  -CP --     Time Frame (Swallow Short Term Goal 1) 1 week  -CP --     Progress/Outcomes (Swallow Short Term Goal 1) new goal  -CP --               User Key  (r) = Recorded By, (t) = Taken By, (c) = Cosigned By      Initials Name Provider Type    AB Stephanie Giraldo, MS CCC-SLP Speech and Language Pathologist    Sanna Giang, SLP Speech and Language Pathologist                       Time Calculation:                TATA Hopkins  7/16/2023

## 2023-07-17 PROCEDURE — 99497 ADVNCD CARE PLAN 30 MIN: CPT | Performed by: NURSE PRACTITIONER

## 2023-07-17 PROCEDURE — 99221 1ST HOSP IP/OBS SF/LOW 40: CPT | Performed by: NURSE PRACTITIONER

## 2023-07-17 PROCEDURE — 25010000002 MORPHINE PER 10 MG: Performed by: HOSPITALIST

## 2023-07-17 RX ADMIN — SENNOSIDES AND DOCUSATE SODIUM 2 TABLET: 50; 8.6 TABLET ORAL at 08:05

## 2023-07-17 RX ADMIN — TIMOLOL MALEATE 1 DROP: 2.5 SOLUTION/ DROPS OPHTHALMIC at 08:05

## 2023-07-17 RX ADMIN — LORAZEPAM 0.25 MG: 0.5 TABLET ORAL at 22:46

## 2023-07-17 RX ADMIN — Medication 10 ML: at 08:06

## 2023-07-17 RX ADMIN — POTASSIUM CHLORIDE, DEXTROSE MONOHYDRATE AND SODIUM CHLORIDE 75 ML/HR: 150; 5; 450 INJECTION, SOLUTION INTRAVENOUS at 15:12

## 2023-07-17 RX ADMIN — MORPHINE SULFATE 1 MG: 2 INJECTION, SOLUTION INTRAMUSCULAR; INTRAVENOUS at 21:29

## 2023-07-17 NOTE — CONSULTS
Palliative Care Consultation    Patient Name: Flori Tubbs  : 1932  MRN: [id number]  Allergies: Epinephrine and Sulfamethoxazole-trimethoprim    Requesting clinician:  Lester  Reason for consult: Consultation for clarification of goals of care and code status.      Patient Code Status:   Code Status and Medical Interventions:   Ordered at: 23 0423     Medical Intervention Limits:    NO intubation (DNI)     Level Of Support Discussed With:    Health Care Surrogate     Code Status (Patient has no pulse and is not breathing):    No CPR (Do Not Attempt to Resuscitate)     Medical Interventions (Patient has pulse or is breathing):    Limited Support           Chief Complaint:    fall    History of Present Illness    Flori Tubbs is a 89 y.o. female who presented to [facility] ED on  from [facility] with reports of a fall. Per facility, patient had gotten up out of her bed and had fallen and hit her face. Patient reportedly complained of right hip pain at facility. No complaints at time of presentation. Patient with baseline dementia, so minimally able to participate in HPI.     In ED:     CT Head Without Contrast   Result Date: 2023  his impression includes finding(s) with follow-up recommendationsHead CT: 1.  No acute intracranial abnormality. 2.  Right frontal scalp hematoma and laceration. No calvarial fracture. 3.  Moderate generalized volume loss and moderate chronic small vessel ischemic changes. Facial CT: 1.  Comminuted, minimally displaced bilateral nasal bone fractures with overlying soft tissue swelling. No other acute facial bone fractures. Cervical spine CT: 1.  No acute fracture or malalignment in the cervical spine. 2.  Diffuse heterogeneous lucencies throughout the cervical and upper thoracic spine. Findings suggest possible diffuse metastatic disease or multiple myeloma. Correlate with any known history of malignancy and consider outpatient nuclear medicine bone scan. 3.  Moderate  multilevel degenerative changes in the cervical spine as discussed above.     T Cervical Spine Without Contrast  Result Date: 2023  This impression includes finding(s) with follow-up recommendationsHead CT: 1.  No acute intracranial abnormality. 2.  Right frontal scalp hematoma and laceration. No calvarial fracture. 3.  Moderate generalized volume loss and moderate chronic small vessel ischemic changes. Facial CT: 1.  Comminuted, minimally displaced bilateral nasal bone fractures with overlying soft tissue swelling. No other acute facial bone fractures. Cervical spine CT: 1.  No acute fracture or malalignment in the cervical spine. 2.  Diffuse heterogeneous lucencies throughout the cervical and upper thoracic spine. Findings suggest possible diffuse metastatic disease or multiple myeloma. Correlate with any known history of malignancy and consider outpatient nuclear medicine bone scan. 3.  Moderate multilevel degenerative changes in the cervical spine as discussed above.     Hem onc consulted for possible cancer noted on CT scans. CT surgery also consulted for possible biopsy. Ultimately elected against due to functional status. Family was active with [facility] prior to admission.      Palliative consult for goals of care discussion in an acutely ill patient with possible new cancer.    VITAL SIGNS:   Temp:  [97.4 °F (36.3 °C)-98.5 °F (36.9 °C)] 97.5 °F (36.4 °C)  Heart Rate:  [74-80] 75  Resp:  [14-16] 16  BP: (146-161)/(79-91) 161/79       PMH: Dementia, CAD, CVA, Colon Ca, HTN, MI, TIA    Past Surgical History:   Procedure Laterality Date    CATARACT EXTRACTION       SECTION      COLON SURGERY  2018    COLONOSCOPY N/A 10/12/2021    Procedure: COLONOSCOPY;  Surgeon: Tommy Kirkland MD;  Location: [facility] ENDOSCOPY;  Service: Gastroenterology;  Laterality: N/A;  diverticulosis, lower GI Bleed    MASS EXCISION  colon       Family History   Problem Relation Age of Onset     Cancer Mother         thyroid, breast    Cancer Father         lung, pancreas    Cancer Sister         breast       Social History     Tobacco Use    Smoking status: Never    Smokeless tobacco: Never   Vaping Use    Vaping Use: Never used   Substance Use Topics    Alcohol use: No    Drug use: No           LABS:    Results from last 7 days   Lab Units 07/15/23  0256   WBC 10*3/mm3 8.40   HEMOGLOBIN g/dL 12.4   HEMATOCRIT % 37.5   PLATELETS 10*3/mm3 219     Results from last 7 days   Lab Units 07/15/23  0256   SODIUM mmol/L 142   POTASSIUM mmol/L 4.3   CHLORIDE mmol/L 106   CO2 mmol/L 22.0   BUN mg/dL 21   CREATININE mg/dL 0.83   GLUCOSE mg/dL 65   CALCIUM mg/dL 9.2     Results from last 7 days   Lab Units 07/15/23  0256   SODIUM mmol/L 142   POTASSIUM mmol/L 4.3   CHLORIDE mmol/L 106   CO2 mmol/L 22.0   BUN mg/dL 21   CREATININE mg/dL 0.83   CALCIUM mg/dL 9.2   BILIRUBIN mg/dL 0.7   ALK PHOS U/L 55   ALT (SGPT) U/L 9   AST (SGOT) U/L 10   GLUCOSE mg/dL 65         IMAGING STUDIES:  No radiology results for the last day      I reviewed the patient's new clinical results including labs, imaging, and vitals.        Scheduled Meds:  docusate sodium, 100 mg, Oral, BID  latanoprost, 1 drop, Both Eyes, Nightly  risperiDONE, 0.25 mg, Oral, Nightly  senna-docusate sodium, 2 tablet, Oral, BID  sodium chloride, 10 mL, Intravenous, Q12H  timolol, 1 drop, Both Eyes, Daily      Continuous Infusions:  dextrose 5 % and sodium chloride 0.45 % with KCl 20 mEq/L, 75 mL/hr, Last Rate: 75 mL/hr (07/16/23 9587)        I have reviewed patient's current medication list.     Review of Systems   Constitutional:  Positive for fatigue.   Neurological:  Positive for weakness.   Psychiatric/Behavioral:  Positive for confusion.    All other systems reviewed and are negative.      Physical Exam  Vitals and nursing note reviewed.   Constitutional:       Appearance: She is ill-appearing.      Comments: sleeping   HENT:      Head: Normocephalic.       Comments: Bruising above left eye     Nose: Nose normal.      Mouth/Throat:      Mouth: Mucous membranes are dry.      Pharynx: Oropharynx is clear.   Eyes:      Extraocular Movements: Extraocular movements intact.      Conjunctiva/sclera: Conjunctivae normal.      Pupils: Pupils are equal, round, and reactive to light.   Cardiovascular:      Rate and Rhythm: Normal rate.      Pulses: Normal pulses.   Pulmonary:      Effort: Pulmonary effort is normal.   Abdominal:      Palpations: Abdomen is soft.   Musculoskeletal:         General: Normal range of motion.      Cervical back: Normal range of motion.   Skin:     General: Skin is dry.      Coloration: Skin is pale.   Neurological:      General: No focal deficit present.      Mental Status: She is disoriented.           PROBLEM LIST:    Fall    Anemia, iron deficiency    Chronic coronary artery disease    History of colon cancer    Dyslipidemia    Glaucoma    History of prosthetic heart valve    Primary hypertension    History of CVA (cerebrovascular accident)    Abnormal CT scan, cervical spine    Nasal bone fracture          ASSESSMENT/PLAN:    Fall: with noted facial fracture on CT. PT/OT to work with patient.     Abnormal cervical spine: from CT. With history of cecal cancer. Concern for possible myeloma or metastatic cancer. Hem onc consulted. Workup in progress. Thoracic surgery consulted for possible biopsy, no plans for intervention due to risks.     Dementia/CVA: chronic conditions per primary.     These illnesses and their management contribute to the need for a palliative consult and advanced care planning.      ADVANCED CARE PLANNIN/17 Patient is active with [facility] and was on their services prior to admission. No plans for chemotherapy due to overall clinical condition. [facility] hospice aware of the patients current admission. I spoke with their liaison. Plan is for patient to transition back to [facility] with [facility]  following for hospice services. Met with son at bedside this am. He had several questions regarding plan for going back to [facility], additional caregiver support, and end of life education. We talked through her medical status and lack of dietary intake. He is concerned about multiple falls and possible need for caregiver support. I reached out to [facility] Liaison who shares that they are in communication with patients daughter to assist in caregiver resources. Son affirms that patient is a DNR/DNI.       Advanced Directives: Patient does not have advance directive  Health Care Directive on file: No  Health Care Surrogate:      Palliative Performance Scale Score:    Comments:           Decisional Capacity: no  Patient's understanding of illness: unknown  Patient goals of care:  DNR/DNI      Thank you for this consult and allowing us to participate in patient's plan of care. Palliative Care Team will continue to follow patient.       I spent 56 minutes reviewing providers documentation, medication records, assessing and examining patient, discussing with family, answering questions, providing guidance about a plan of care, and coordinating care with other healthcare members. More than 50% of time spent face to face discussing disease education, current clinical status, and medication management.     I spent an additional 19 minutes on advanced care planning, goals of care, and code status discussion.  I obtained consent for ACP discussion.     Marcia Cherry, APRN  7/17/2023

## 2023-07-17 NOTE — PLAN OF CARE
Problem: Adult Inpatient Plan of Care  Goal: Absence of Hospital-Acquired Illness or Injury  Intervention: Identify and Manage Fall Risk  Recent Flowsheet Documentation  Taken 7/17/2023 1400 by Anni Hein RN  Safety Promotion/Fall Prevention: safety round/check completed  Taken 7/17/2023 1200 by Anni Hein RN  Safety Promotion/Fall Prevention: safety round/check completed  Taken 7/17/2023 1000 by Anni Hein RN  Safety Promotion/Fall Prevention: safety round/check completed  Taken 7/17/2023 0800 by Anni Hein RN  Safety Promotion/Fall Prevention:   activity supervised   assistive device/personal items within reach   clutter free environment maintained   fall prevention program maintained   lighting adjusted   nonskid shoes/slippers when out of bed   safety round/check completed   room organization consistent  Intervention: Prevent and Manage VTE (Venous Thromboembolism) Risk  Recent Flowsheet Documentation  Taken 7/17/2023 0800 by Anni Hein RN  VTE Prevention/Management:   bilateral   sequential compression devices off  Intervention: Prevent Infection  Recent Flowsheet Documentation  Taken 7/17/2023 0800 by Anni Hein RN  Infection Prevention:   hand hygiene promoted   personal protective equipment utilized   single patient room provided  Goal: Optimal Comfort and Wellbeing  Intervention: Provide Person-Centered Care  Recent Flowsheet Documentation  Taken 7/17/2023 0800 by Anni Hein RN  Trust Relationship/Rapport: emotional support provided     Problem: Fall Injury Risk  Goal: Absence of Fall and Fall-Related Injury  Intervention: Promote Injury-Free Environment  Recent Flowsheet Documentation  Taken 7/17/2023 1400 by Anni Hein RN  Safety Promotion/Fall Prevention: safety round/check completed  Taken 7/17/2023 1200 by Anni Hein RN  Safety Promotion/Fall Prevention: safety round/check completed  Taken 7/17/2023 1000 by Anni Hein  RN  Safety Promotion/Fall Prevention: safety round/check completed  Taken 7/17/2023 0800 by Anni Hein RN  Safety Promotion/Fall Prevention:   activity supervised   assistive device/personal items within reach   clutter free environment maintained   fall prevention program maintained   lighting adjusted   nonskid shoes/slippers when out of bed   safety round/check completed   room organization consistent   Goal Outcome Evaluation:

## 2023-07-17 NOTE — PROGRESS NOTES
.Hematology/Oncology Inpatient Progress Note    PATIENT NAME: Flori Tubbs  : 1932  MRN: [id number]    CHIEF COMPLAINT: Large right pericardial/mediastinal mass, Abnormal cervical spine CT with history of cecal cancer and hypercalcemia; Stage I cecal cancer     HISTORY OF PRESENT ILLNESS:    89 y.o. female presented to [facility] ED on 2023 after falling at her nursing home.  She has dementia and is on a locked patient care unit.  Her son was present and reports she was diagnosed with dementia a year ago.  She recently had a decrease in her performance status and has not been calling him by name.  Most recently she has been mumbling.  CMP was remarkable for calcium of 10.2.  White count 8.9, hemoglobin 13.7, MCV 94.9 and platelets 241,000.  CT head, facial and cervical spine showed no acute intracranial abnormality.  There was a right frontal scalp hematoma laceration but no calvarial fracture.  Moderate generalized volume loss and moderate chronic small vessel ischemic changes were present.  There were comminuted minimally displaced bilateral nasal bone fractures with overlying soft tissue swelling.  There was no acute fracture or malalignment in the cervical spine.  Diffuse heterogeneous lucencies throughout the cervical and upper thoracic spine were present.  Suggestive of diffuse metastatic disease or multiple myeloma.  Moderate multilevel degenerative changes in the cervical spine were present.  Findings were discussed with her son with desire to further evaluate for possible malignancy.  Her CODE STATUS is DNR/DNI.    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  Diagnosed in 2018, treated with right hemicolectomy per Dr. Duffy.  Her tumor was 3.5 cm, 20 lymph nodes were negative for metastasis and margins were negative.  There was invasion but the invasion did not go through the muscularis propria.  MMR  was intact.  Colonoscopy on 10/12/2021 by Dr. Kirkland, showed multiple small polyps that were not resected.  There was bright red blood from moderate to severe left diverticular disease and grade 2 internal hemorrhoids.     07/12/23  Hematology/Oncology was consulted by QUAN Aleman for an abnormal CT cervical spine.     Past Medical History: Cecal cancer 2018.  Bilateral knee OA treated with injections by REKHA Jerome DO.  Diverticulitis and GI bleeding 2021.  Surgical history: Right hemicolectomy 2018.  Colonoscopy 2021.  Cardiac stent placement 2017. Cholecystectomy years ago.  Social History: She lives at [facility].  She worked minimally outside the home.  No history of smoking or alcohol use.  Family History: Her mother had thyroid cancer.  Allergies: No known allergies.     PCP: Kaye Wall APRN    INTERVAL HISTORY:  7/13/2023 - Ionized calcium 1.33 (1.20-1.30), CEA 1.81 (<3 in non-smoker), Calcium 9.7 (8.2-9.6).  7/14/2023 - CT CAP with contrast showed a large right pericardial/mediastinal mass, 9.8 x 7.7 x 9.4 cm with mixed cystic-solid components. It created a mass effect upon the mediastinum, deviating the heart to the left of midline. The mass had significantly enlarged since 2018. Differential diagnosis can include but was not limited to thymoma, thymic carcinoma, teratoma, metastatic disease of unknown primary origin.  No evidence of recurrent malignancy or metastatic disease in the abdomen or pelvis. Partial right hemicolectomy with ileocolic anastomosis. SPEP showed asymmetrical gamma.  FLC ratio 1.73, free kappa light chains 40.0 (3.3-19.4), free lambda light chains 23.1 (5.7-26.3).  7/15/2023-SIFE showed unclear presence of monoclonal protein.  IgA 291 (), IgG 891 ([id number]), IgM 41 ().  Surgery did not recommend biopsy secondary to comorbidities including dementia with inability to care for herself.  7/16/2023-more alert.  7/17/2023- Vanesa swab negative. Swallow  study with recommendation for puree diet with thin liquids.     History of present illness reviewed since last visit and changes noted on 07/17/23.    Subjective   Denies any problems.     ROS:  Review of Systems   Constitutional:  Negative for activity change, chills, fatigue, fever and unexpected weight change.   HENT:  Negative for congestion, dental problem, hearing loss, mouth sores, nosebleeds, sore throat and trouble swallowing.    Eyes:  Negative for photophobia and visual disturbance.   Respiratory:  Negative for cough, chest tightness and shortness of breath.    Cardiovascular:  Negative for chest pain, palpitations and leg swelling.   Gastrointestinal:  Negative for abdominal distention, abdominal pain, blood in stool, constipation, diarrhea, nausea and vomiting.   Endocrine: Negative for cold intolerance and heat intolerance.   Genitourinary:  Negative for decreased urine volume, difficulty urinating, frequency, hematuria and urgency.   Musculoskeletal:  Negative for arthralgias and gait problem.   Skin:  Negative for rash and wound.   Neurological:  Positive for speech difficulty. Negative for dizziness, tremors, weakness, light-headedness, numbness and headaches.   Hematological:  Negative for adenopathy. Does not bruise/bleed easily.   Psychiatric/Behavioral:  Positive for confusion. Negative for hallucinations. The patient is not nervous/anxious.    All other systems reviewed and are negative.     MEDICATIONS:    Scheduled Meds:  docusate sodium, 100 mg, Oral, BID  latanoprost, 1 drop, Both Eyes, Nightly  risperiDONE, 0.25 mg, Oral, Nightly  senna-docusate sodium, 2 tablet, Oral, BID  sodium chloride, 10 mL, Intravenous, Q12H  timolol, 1 drop, Both Eyes, Daily    Continuous Infusions:  dextrose 5 % and sodium chloride 0.45 % with KCl 20 mEq/L, 75 mL/hr, Last Rate: 75 mL/hr (07/16/23 6729)    PRN Meds:    acetaminophen **OR** acetaminophen **OR** acetaminophen    aluminum-magnesium  "hydroxide-simethicone    senna-docusate sodium **AND** polyethylene glycol **AND** bisacodyl **AND** bisacodyl    LORazepam    Morphine    nitroglycerin    ondansetron **OR** ondansetron    [COMPLETED] Insert Peripheral IV **AND** sodium chloride    sodium chloride    sodium chloride    traMADol     ALLERGIES:    Allergies   Allergen Reactions    Epinephrine Unknown (See Comments)    Sulfamethoxazole-Trimethoprim Rash     Objective    VITALS:   /79 (BP Location: Right arm, Patient Position: Lying)   Pulse 75   Temp 97.5 °F (36.4 °C) (Axillary)   Resp 16   Ht 167.6 cm (66\")   Wt 51 kg (112 lb 7 oz)   SpO2 95%   BMI 18.15 kg/m²     PHYSICAL EXAM:  Physical Exam  Vitals and nursing note reviewed.   Constitutional:       General: She is not in acute distress.     Appearance: Normal appearance. She is well-developed. She is not diaphoretic.   HENT:      Head: Normocephalic and atraumatic.      Nose: Nose normal.      Comments: Steri-strips to bridge of nose.      Mouth/Throat:      Mouth: Mucous membranes are moist.      Pharynx: Oropharynx is clear. No oropharyngeal exudate or posterior oropharyngeal erythema.      Comments: Dental fillings.   Eyes:      General: No scleral icterus.     Extraocular Movements: Extraocular movements intact.      Conjunctiva/sclera: Conjunctivae normal.      Pupils: Pupils are equal, round, and reactive to light.   Cardiovascular:      Rate and Rhythm: Normal rate and regular rhythm.      Heart sounds: Normal heart sounds. No murmur heard.     Comments: Cardiac monitor leads.   Pulmonary:      Effort: Pulmonary effort is normal. No respiratory distress.      Breath sounds: Normal breath sounds. No wheezing or rales.   Abdominal:      General: Bowel sounds are normal. There is no distension.      Palpations: Abdomen is soft. There is no mass.      Tenderness: There is no abdominal tenderness. There is no guarding.   Genitourinary:     Comments: Deferred   Musculoskeletal:       "   General: No swelling, tenderness or deformity. Normal range of motion.      Cervical back: Normal range of motion and neck supple.      Right lower leg: No edema.      Left lower leg: No edema.      Comments: LUE peripheral IV.    Lymphadenopathy:      Cervical: No cervical adenopathy.      Upper Body:      Right upper body: No supraclavicular adenopathy.      Left upper body: No supraclavicular adenopathy.   Skin:     General: Skin is warm and dry.      Coloration: Skin is not pale.      Findings: No bruising, erythema or rash.   Neurological:      General: No focal deficit present.      Mental Status: She is alert. She is confused.      Coordination: Coordination normal.      Comments: Oriented x 1    Psychiatric:      Comments: Unable to assess       RECENT LABS:  Lab Results (last 24 hours)       Procedure Component Value Units Date/Time    CANDIDA AURIS SCREEN - Swab, Axilla Right, Axilla Left and Groin [[id number]]  (Normal) Collected: 07/16/23 0303    Specimen: Swab from Axilla Right, Axilla Left and Groin Updated: 07/17/23 0331     Candida Auris Screen Culture No Candida auris isolated at 24 hours          PENDING RESULTS: None    IMAGING REVIEWED:  No radiology results for the last day    I have reviewed the patient's labs, imaging, reports, and other clinician documentation.    Assessment & Plan   ASSESSMENT  Large right pericardial/mediastinal mass, abnormal cervical spine CT with history of cecal cancer and hypercalcemia-concern for possible multiple myeloma or metastatic disease.  SPEP showed asymmetrical gamma, and elevated kappa/lambda ratio. SIFE findings are unclear and immunoglobulins are normal. CT C/A/P showed a large mediastinal/pericardial mass. CEA was normal. Ionized calcium was elevated on admission but calcium is now low.  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  Have had a long discussion with son regarding options of treatment to include palliative care only versus biopsy of  mediastinal mass through mediastinoscopy.  Seen by thoracic surgery with biopsy not recommended secondary to comorbidities.  Patient has been on outpatient hospice with [facility].   Stage I cecal cancer-she has never recurred and is up-to-date on her surveillance colonoscopy. CEA normal.   Fall with nasal fracture, osteoarthritis, dementia, and history of CVA-per primary team.  Hypoglycemia/lethargy-  Per primary team.     PLAN  Plan is discharge to [facility] with [facility].     Electronically signed by QUAN Islas, 07/17/23, 11:30 AM EDT.        I have personally performed a face-to-face diagnostic evaluation on this patient. I have performed a complete history and physical examination, reviewed laboratory studies, and radiographic examinations.  I have completed the majority and substantive portion of the medical decision making.  I have formulated the assessment on this patient and the plan of action as noted above. I have discussed the case with Brittany Hodgson NP, have edited/reviewed the note, and agree with the care plan.  She remains lethargic.  She is arousable and does answer questions but frequently is hard to understand.  She has been evaluated by speech and language pathologist.  She can initiate on a purée diet with thin liquids by spoon.  Have discussed situation in detail with son.  Recommend comfort care.        I discussed the patient's findings and my recommendations with patient and nursing.    Part of this note may be an electronic transcription/translation of spoken language to printed text using the Dragon Dictation System.     Electronically signed by Rajinder Clifford MD, 07/17/23, 4:23 PM EDT.

## 2023-07-17 NOTE — PROGRESS NOTES
[facility]  INPATIENT PROGRESS NOTE    Length of Stay: 3  Date of Admission: 7/12/2023  Primary Care Physician: Kaye Wall APRN    Subjective     Admitted in consultation with oncology and palliative care for generalized weakness, fall, and mediastinal mass    Remains weak and debilitated.  Son is at the bedside.  She has been residing in a memory care unit in [place] with the recent addition of outpatient hospice support with [facility].  Her son tells me she has become quite a bit more debilitated and dependent on care for over the past few weeks.  She has been more and more somnolent.  Her oral intake is suffering.  Extensive chart review for service turnover today. Discussed the case with the bedside nursing staff. No other acute concerns.    Objective    Temp:  [97.4 °F (36.3 °C)-98.5 °F (36.9 °C)] 97.5 °F (36.4 °C)  Heart Rate:  [74-80] 75  Resp:  [14-16] 16  BP: (146-161)/(79-91) 161/79    Physical Exam:  GEN: Chronically ill-appearing elderly woman, somnolent but somewhat arousable, no acute distress  HEENT: NCAT, PERRLA, dry mucous membranes  NECK: Supple, midline trachea  CARD: RRR, no appreciable M/R/G, no peripheral edema  PULM: Fairly CTAB, diminished at the bases, non-distressed  ABD: soft, NTND, normoactive bowel sounds throughout  NEURO: Grossly intact, non-focal exam  PSYCH: Unable to assess    Results Review:  I have reviewed the labs, radiology results, and diagnostic studies.    Laboratory Data:   Lab Results (last 24 hours)       Procedure Component Value Units Date/Time    CANDIDA AURIS SCREEN - Swab, Axilla Right, Axilla Left and Groin [[id number]]  (Normal) Collected: 07/16/23 0303    Specimen: Swab from Axilla Right, Axilla Left and Groin Updated: 07/17/23 0331     Candida Auris Screen Culture No Candida auris isolated at 24 hours            Culture Data:   No results found for: BLOODCX  No results found for:  URINECX  No results found for: RESPCX  No results found for: WOUNDCX  No results found for: STOOLCX  No components found for: BODYFLD    Radiology Data:   Imaging Results (Last 24 Hours)       ** No results found for the last 24 hours. **          I have reviewed the patient's current medications.     Assessment/Plan     Active Hospital Problems    Diagnosis     **Fall     Abnormal CT scan, cervical spine     Nasal bone fracture     Memory impairment     Primary hypertension     History of CVA (cerebrovascular accident)     Anemia, iron deficiency     Weakness     History of colon cancer     Mediastinal mass     Chronic coronary artery disease     History of prosthetic heart valve     Dyslipidemia     Glaucoma      Fall-right frontal scalp hematoma-nasal bone fractures-continue with supportive care.  CT scan showing displaced bilateral nasal bone fracture-continue with pain management and supportive care     Mediastinal mass-oncology following, current recommendations are expectant management with no further diagnostics      History of CVA-provide with supportive care and continue with statin therapy     Dementia-continue with Aricept.     Failure to thrive-patient and family do not want feeding tube.  Continue to encourage p.o. intake    Disposition-hoping to go back to her memory care unit with hospice and increased caregiver support.  Case management is working with the family and the facility along with [facility] to make sure that she can discharge back to their facility safely, hopefully tomorrow.  She will essentially need full-time care assistance.     DVT prophylaxis-SCD     DNR     Jos Colunga MD   07/17/23   08:31 EDT

## 2023-07-17 NOTE — CASE MANAGEMENT/SOCIAL WORK
Continued Stay Note  JEREMY Pierce     Patient Name: Flori Tubbs  MRN: [id number]  Today's Date: 7/17/2023    Admit Date: 7/12/2023    Plan: Return to [facility] at [facility] with [facility]. PASRR approved if needed.   Discharge Plan       Row Name 07/17/23 1433       Plan    Plan Return to Critical access hospitals at [facility] with [facility]. PASRR approved if needed.    Patient/Family in Agreement with Plan yes    Plan Comments CM confirmed with [facility] liaison Tara that she has talked with patient's daughter Chandler at [facility]. Everyone is in agreeement with pt returning to [facility] and will receive the care needed. Tara reports getting DME ordered and delivered this evening. Pt will be good to go tomorrow 7/18 AM. Spoke to patient's daughter at bedside and she verbalized agreement.             Megan Naegele, RN     Office Phone: [phone]  Office Cell: [phone]

## 2023-07-18 VITALS
SYSTOLIC BLOOD PRESSURE: 137 MMHG | OXYGEN SATURATION: 98 % | DIASTOLIC BLOOD PRESSURE: 98 MMHG | HEIGHT: 66 IN | TEMPERATURE: 98.1 F | HEART RATE: 98 BPM | BODY MASS INDEX: 18.07 KG/M2 | RESPIRATION RATE: 16 BRPM | WEIGHT: 112.43 LBS

## 2023-07-18 PROBLEM — E43 SEVERE PROTEIN-CALORIE MALNUTRITION: Status: ACTIVE | Noted: 2021-10-15

## 2023-07-18 RX ADMIN — POTASSIUM CHLORIDE, DEXTROSE MONOHYDRATE AND SODIUM CHLORIDE 75 ML/HR: 150; 5; 450 INJECTION, SOLUTION INTRAVENOUS at 05:21

## 2023-07-18 RX ADMIN — Medication 10 ML: at 08:05

## 2023-07-18 RX ADMIN — TIMOLOL MALEATE 1 DROP: 2.5 SOLUTION/ DROPS OPHTHALMIC at 08:04

## 2023-07-18 NOTE — DISCHARGE SUMMARY
[facility] Medicine Services  Discharge Summary    Date of Service: 2023  Patient Name: Flori Tubbs  : 1932  MRN: [id number]    Date of Admission: 2023  Discharge Diagnosis: See below  Date of Discharge: 2023  Primary Care Physician: Kaye Wall APRN    Presenting Problem:   Fall [W19.XXXA]  Abnormal CT scan, neck [R93.89]  Closed fracture of nasal bone, initial encounter [S02.2XXA]  Fall, initial encounter [W19.XXXA]    Active and Resolved Hospital Problems:  Active Hospital Problems    Diagnosis POA    **Fall [W19.XXXA] Yes    Abnormal CT scan, cervical spine [R93.7] Yes    Nasal bone fracture [S02.2XXA] Yes    Severe protein-calorie malnutrition [E43] Yes    Memory impairment [R41.3] Yes    Primary hypertension [I10] Yes    History of CVA (cerebrovascular accident) [Z86.73] Not Applicable    Anemia, iron deficiency [D50.9] Yes    Weakness [R53.1] Yes    History of colon cancer [Z85.038] Yes    Mediastinal mass [J98.59] Yes    Chronic coronary artery disease [I25.10] Yes    History of prosthetic heart valve [Z95.2] Not Applicable    Dyslipidemia [E78.5] Yes    Glaucoma [H40.9] Yes      Resolved Hospital Problems   No resolved problems to display.     Hospital Course     HPI:  Flori Tubbs is a 89 y.o. female with PMH of CAD s/p PCI, hypertension, dyslipidemia, previous colon cancer s/p resection, CVA, dementia, glaucoma who lives at [facility] in a locked memory care unit. She presented to [facility] ED 2023 after a fall. She hit her head and face. She is wheelchair bound at baseline and needs help with all ADLs. She is disoriented x3 at baseline as well.      In the ED CT head showed no acute intracranial abnormality.  Right frontal scalp hematoma laceration.  No calvarial fracture.  Moderate generalized volume loss and moderate chronic small vessel ischemic changes.  Facial CT showed comminuted, minimally displaced bilateral nasal bone  fractures with overlying soft tissue swelling.  No other acute facial bone fractures.  Cervical spine CT showed no acute fracture or malalignment.  Diffuse heterogeneous lucencies throughout the cervical and upper thoracic spine.  Findings suggest possible diffuse metastatic disease or multiple myeloma.  Correlate with any known history of malignancy and consider outpatient nuclear medicine bone scan.  Moderate multilevel degenerative changes in the cervical spine as discussed above. Labs pending. BP mildly elevated, other vitals wnl. Family would like information from the oncology team regarding the CT cervical spine findings. They are not likely to pursue any treatment but would like to know more about it. They confirmed the patient is a DNR/DNI.     Hospital course:  She was admitted in consultation with oncology, cardiothoracic surgery, and palliative care for further evaluation and treatment.  Even though she has a history of stage I cecal cancer, she has not had recurrence and has been up-to-date on her surveillance colonoscopy.  Labs would not suggest recurrence of colon cancer.  Certainly multiple myeloma or another metastatic etiology remain in the differential.  Cardiothoracic surgery recommended against further diagnostics given her multitude of medical comorbidities and age. Oncology and palliative care agreed with these recommendations, and her son ultimately agreed.  She had been residing in an assisted living/memory care unit at [facility] at the [facility] with the recent addition of outpatient hospice support with [facility].  They have only been coming in for an hour a day twice weekly. Her son tells me she has become quite a bit more debilitated and dependent on care for over the past few weeks.  Since her fall, she has been more somnolent in the hospital, and her oral intake is suffering.  The family has arranged for her to go back to her facility with full hospice  support, and the facility is willing to take her back.  She will discharge there today for ongoing comfort and hospice care.    Day of Discharge     Vital Signs:  Temp:  [98.1 °F (36.7 °C)-98.8 °F (37.1 °C)] 98.1 °F (36.7 °C)  Heart Rate:  [80-98] 98  Resp:  [16-18] 16  BP: (137-159)/(82-98) 137/98    Physical Exam:  GEN: Chronically ill-appearing elderly woman, somnolent but somewhat arousable, no acute distress  HEENT: NCAT, PERRLA, dry mucous membranes  NECK: Supple, midline trachea  CARD: RRR, no appreciable M/R/G, no peripheral edema  PULM: Fairly CTAB, diminished at the bases, non-distressed  ABD: soft, NTND, normoactive bowel sounds throughout  SKIN: Warm/dry/see below for full nursing skin and wound survey  NEURO: Grossly intact, non-focal exam  PSYCH: Unable to assess    Wounds (last 24 hours)       LDA Wound       Row Name 07/18/23 0800 07/17/23 2000          Wound 07/12/23 0807 Bilateral sacral spine    Wound - Properties Group Placement Date: 07/12/23  -RP Placement Time: 0807  -RP Present on Hospital Admission: Y  -RP Side: Bilateral  -RP Location: sacral spine  -RP    Dressing Appearance dry;intact  -MH dry;intact  -JM     Closure Adhesive bandage  -MH --     Base clean;moist  -MH --     Periwound intact  -MH --     Periwound Temperature warm  -MH --     Care, Wound cleansed with;soap and water;barrier applied  -MH --     Dressing Care dressing changed;silicone  -MH --     Retired Wound - Properties Group Placement Date: 07/12/23  -RP Placement Time: 0807  -RP Present on Hospital Admission: Y  -RP Side: Bilateral  -RP Location: sacral spine  -RP    Retired Wound - Properties Group Date first assessed: 07/12/23  -RP Time first assessed: 0807  -RP Present on Hospital Admission: Y  -RP Side: Bilateral  -RP Location: sacral spine  -RP       Wound 07/12/23 0807 Right lower arm Skin Tear    Wound - Properties Group Placement Date: 07/12/23  -RP Placement Time: 0807  -RP Present on Hospital Admission: Y  -RP  Side: Right  -RP Orientation: lower  -RP Location: arm  -RP Primary Wound Type: Skin tear  -RP    Dressing Appearance dry;intact  -MH dry;intact  -JM     Closure RODRIGUEZ  -MH --     Base dressing in place, unable to visualize  -MH --     Retired Wound - Properties Group Placement Date: 07/12/23  -RP Placement Time: 0807  -RP Present on Hospital Admission: Y  -RP Side: Right  -RP Orientation: lower  -RP Location: arm  -RP Primary Wound Type: Skin tear  -RP    Retired Wound - Properties Group Date first assessed: 07/12/23  -RP Time first assessed: 0807  -RP Present on Hospital Admission: Y  -RP Side: Right  -RP Location: arm  -RP Primary Wound Type: Skin tear  -RP       Wound 07/12/23 0808 nose Abrasion    Wound - Properties Group Placement Date: 07/12/23  -RP Placement Time: 0808  -RP Present on Hospital Admission: Y  -RP Location: nose  -RP Primary Wound Type: Abrasion  -RP    Dressing Appearance open to air  -MH open to air  -JM     Closure Adhesive closure strips  -MH --     Number of Adhesive Closure Strips 2  -MH --     Retired Wound - Properties Group Placement Date: 07/12/23  -RP Placement Time: 0808  -RP Present on Hospital Admission: Y  -RP Location: nose  -RP Primary Wound Type: Abrasion  -RP    Retired Wound - Properties Group Date first assessed: 07/12/23 -RP Time first assessed: 0808  -RP Present on Hospital Admission: Y  -RP Location: nose  -RP Primary Wound Type: Abrasion  -RP       Wound 07/12/23 0808 scalp Abrasion    Wound - Properties Group Placement Date: 07/12/23  -RP Placement Time: 0808  -RP Present on Hospital Admission: Y  -RP Location: scalp  -RP Primary Wound Type: Abrasion  -RP    Dressing Appearance open to air  -MH open to air  -JM     Retired Wound - Properties Group Placement Date: 07/12/23  -RP Placement Time: 0808  -RP Present on Hospital Admission: Y  -RP Location: scalp  -RP Primary Wound Type: Abrasion  -RP    Retired Wound - Properties Group Date first assessed: 07/12/23  -RP Time  first assessed: 0808  -RP Present on Hospital Admission: Y  -RP Location: scalp  -RP Primary Wound Type: Abrasion  -RP              User Key  (r) = Recorded By, (t) = Taken By, (c) = Cosigned By      Initials Name Provider Type    RP Salena Castillo, RN Registered Nurse    Carlos Velez RN Registered Nurse    Renata Maria RN Registered Nurse                    Pertinent  and/or Most Recent Results     LAB RESULTS:      Lab 07/15/23  0256 07/13/23  0028 07/12/23  0512   WBC 8.40 7.90 8.90   HEMOGLOBIN 12.4 12.9 13.7   HEMATOCRIT 37.5 38.2 41.6   PLATELETS 219 225 241   NEUTROS ABS 5.80 4.60 6.30   LYMPHS ABS 1.40 1.60 1.50   MONOS ABS 0.80 0.80 0.60   EOS ABS 0.40 0.80* 0.50*   MCV 92.6 94.2 94.9         Lab 07/15/23  0256 07/13/23 0028 07/12/23 1430 07/12/23  0512   SODIUM 142 142  --  142   POTASSIUM 4.3 4.4  --  4.0   CHLORIDE 106 105  --  105   CO2 22.0 28.0  --  27.0   ANION GAP 14.0 9.0  --  10.0   BUN 21 16  --  20   CREATININE 0.83 0.83  --  0.94   EGFR 66.6 66.6  --  57.4*   GLUCOSE 65 92  --  90   CALCIUM 9.2 9.7*  --  10.2*   IONIZED CALCIUM  --   --  1.33*  --          Lab 07/15/23  0256 07/12/23  1430 07/12/23  0512   TOTAL PROTEIN 6.1  --  7.0   ALBUMIN 3.2* 3.3 4.1   GLOBULIN 2.9  --  2.9   ALT (SGPT) 9  --  12   AST (SGOT) 10  --  14   BILIRUBIN 0.7  --  0.7   ALK PHOS 55  --  75                     Brief Urine Lab Results       None          Microbiology Results (last 10 days)       Procedure Component Value - Date/Time    CANDIDA AURIS SCREEN - Swab, Axilla Right, Axilla Left and Groin [[id number]]  (Normal) Collected: 07/16/23 0303    Lab Status: Preliminary result Specimen: Swab from Axilla Right, Axilla Left and Groin Updated: 07/18/23 0330     Candida Auris Screen Culture No Candida auris isolated at 2 days            CT Head Without Contrast    Result Date: 7/12/2023  Impression: **This impression includes finding(s) with follow-up recommendations** Head CT: 1.  No acute intracranial  abnormality. 2.  Right frontal scalp hematoma and laceration. No calvarial fracture. 3.  Moderate generalized volume loss and moderate chronic small vessel ischemic changes. Facial CT: 1.  Comminuted, minimally displaced bilateral nasal bone fractures with overlying soft tissue swelling. No other acute facial bone fractures. Cervical spine CT: 1.  No acute fracture or malalignment in the cervical spine. 2.  Diffuse heterogeneous lucencies throughout the cervical and upper thoracic spine. Findings suggest possible diffuse metastatic disease or multiple myeloma. Correlate with any known history of malignancy and consider outpatient nuclear medicine bone scan. 3.  Moderate multilevel degenerative changes in the cervical spine as discussed above. Electronically signed by:  Jackson Srivastava M.D.  7/12/2023 1:24 AM Mountain Time    CT Chest With Contrast Diagnostic    Result Date: 7/13/2023  Impression: 1. Large right pericardial/mediastinal mass, 9.8 x 7.7 x 9.4 cm with mixed cystic-solid components. It creates mass effect upon the mediastinum, deviating the heart to the left of midline. The mass has significantly enlarged since 2018. Differential diagnosis can include but is not limited to thymoma, thymic carcinoma, teratoma, metastatic disease of unknown primary origin. Surgical consultation is advised. 2. No evidence of recurrent malignancy or metastatic disease in the abdomen or pelvis. Partial right hemicolectomy with ileocolic anastomosis. 3. Additional chronic findings as described above. Electronically Signed: Sue Guerrero  7/13/2023 11:00 AM EDT  Workstation ID: [id number]    CT Cervical Spine Without Contrast    Result Date: 7/12/2023  Impression: **This impression includes finding(s) with follow-up recommendations** Head CT: 1.  No acute intracranial abnormality. 2.  Right frontal scalp hematoma and laceration. No calvarial fracture. 3.  Moderate generalized volume loss and moderate chronic small vessel ischemic  changes. Facial CT: 1.  Comminuted, minimally displaced bilateral nasal bone fractures with overlying soft tissue swelling. No other acute facial bone fractures. Cervical spine CT: 1.  No acute fracture or malalignment in the cervical spine. 2.  Diffuse heterogeneous lucencies throughout the cervical and upper thoracic spine. Findings suggest possible diffuse metastatic disease or multiple myeloma. Correlate with any known history of malignancy and consider outpatient nuclear medicine bone scan. 3.  Moderate multilevel degenerative changes in the cervical spine as discussed above. Electronically signed by:  Jackson Srivastava M.D.  7/12/2023 1:24 AM Mountain Time    CT Abdomen Pelvis With Contrast    Result Date: 7/13/2023  Impression: 1. Large right pericardial/mediastinal mass, 9.8 x 7.7 x 9.4 cm with mixed cystic-solid components. It creates mass effect upon the mediastinum, deviating the heart to the left of midline. The mass has significantly enlarged since 2018. Differential diagnosis can include but is not limited to thymoma, thymic carcinoma, teratoma, metastatic disease of unknown primary origin. Surgical consultation is advised. 2. No evidence of recurrent malignancy or metastatic disease in the abdomen or pelvis. Partial right hemicolectomy with ileocolic anastomosis. 3. Additional chronic findings as described above. Electronically Signed: Sue Guerrero  7/13/2023 11:00 AM EDT  Workstation ID: [id number]    CT Facial Bones Without Contrast    Result Date: 7/12/2023  Impression: **This impression includes finding(s) with follow-up recommendations** Head CT: 1.  No acute intracranial abnormality. 2.  Right frontal scalp hematoma and laceration. No calvarial fracture. 3.  Moderate generalized volume loss and moderate chronic small vessel ischemic changes. Facial CT: 1.  Comminuted, minimally displaced bilateral nasal bone fractures with overlying soft tissue swelling. No other acute facial bone fractures. Cervical  spine CT: 1.  No acute fracture or malalignment in the cervical spine. 2.  Diffuse heterogeneous lucencies throughout the cervical and upper thoracic spine. Findings suggest possible diffuse metastatic disease or multiple myeloma. Correlate with any known history of malignancy and consider outpatient nuclear medicine bone scan. 3.  Moderate multilevel degenerative changes in the cervical spine as discussed above. Electronically signed by:  Jackson Srivastava M.D.  7/12/2023 1:24 AM Mountain Time    XR Hip With or Without Pelvis 2 - 3 View Right    Result Date: 7/12/2023  Impression: Impression: 1. Study is limited by diffuse osteopenia. If there is high clinical suspicion additional imaging can be obtained. 2. No definite acute osseous abnormality given limitations of study Electronically Signed: Simon Berg  7/12/2023 7:42 AM EDT  Workstation ID: [id number]     Labs Pending at Discharge:  Pending Labs       Order Current Status    CANDIDA AURIS SCREEN - Swab, Axilla Right, Axilla Left and Groin Preliminary result          Consults:   Consults       Date and Time Order Name Status Description    7/14/2023 12:45 PM Inpatient Thoracic Surgery Consult Completed     7/12/2023  5:02 AM Hematology & Oncology Inpatient Consult Completed     7/12/2023  3:52 AM Hospitalist (on-call MD unless specified)            Discharge Details        Discharge Medications        Continue These Medications        Instructions Start Date   acetaminophen 500 MG tablet  Commonly known as: TYLENOL   500 mg, Oral, 2 Times Daily      acetaminophen 500 MG tablet  Commonly known as: TYLENOL   500 mg, Oral, 2 Times Daily PRN      bimatoprost 0.01 % ophthalmic drops  Commonly known as: LUMIGAN   1 drop, Both Eyes, Nightly      docusate sodium 100 MG capsule  Commonly known as: COLACE   100 mg, Oral, 2 Times Daily      fluticasone 50 MCG/ACT nasal spray  Commonly known as: FLONASE   1 spray, Nasal, Daily      hydrocortisone 1 % cream   1 application ,  Topical, 2 Times Daily PRN, rectum      loratadine 10 MG tablet  Commonly known as: CLARITIN   10 mg, Oral, Daily PRN      LORazepam 0.5 MG tablet  Commonly known as: ATIVAN   0.25 mg, Oral, Every 8 Hours PRN      magnesium hydroxide 400 MG/5ML suspension  Commonly known as: MILK OF MAGNESIA   30 mL, Oral, Daily PRN      nitroglycerin 0.4 MG SL tablet  Commonly known as: NITROSTAT   0.4 mg, Sublingual, Every 5 Minutes PRN, Take no more than 3 doses in 15 minutes.      ondansetron 4 MG tablet  Commonly known as: ZOFRAN   4 mg, Oral, Every 6 Hours PRN      polyethylene glycol 17 GM/SCOOP powder  Commonly known as: MIRALAX   17 g, Oral, Daily      risperiDONE 0.25 MG tablet  Commonly known as: risperDAL   0.25 mg, Oral, Every Night at Bedtime      timolol 0.25 % ophthalmic solution  Commonly known as: TIMOPTIC   1 drop, Both Eyes, 2 times daily      traMADol 50 MG tablet  Commonly known as: ULTRAM   50 mg, Oral, Every 6 Hours PRN             Allergies   Allergen Reactions    Epinephrine Unknown (See Comments)    Sulfamethoxazole-Trimethoprim Rash     Discharge Disposition:   Hospice/Home    Diet:  Hospital:  Diet Order   Procedures    Diet: Regular/House Diet; Feeding Assistance - Nursing; Texture: Pureed (NDD 1); Fluid Consistency: Thin (IDDSI 0)     Discharge Activity:   Activity Instructions       Activity as Tolerated            CODE STATUS:  Code Status and Medical Interventions:   Ordered at: 07/12/23 0423     Medical Intervention Limits:    NO intubation (DNI)     Level Of Support Discussed With:    Health Care Surrogate     Code Status (Patient has no pulse and is not breathing):    No CPR (Do Not Attempt to Resuscitate)     Medical Interventions (Patient has pulse or is breathing):    Limited Support     No future appointments.    Additional Instructions for the Follow-ups that You Need to Schedule       Call MD With Problems / Concerns   As directed      Instructions: PCM/hospice providers    Order Comments:  Instructions: PCM/hospice providers          Discharge Follow-up with PCP   As directed       Currently Documented PCP:    Kaye Wall APRN    PCP Phone Number:    [phone]     Follow Up Details: As desired         Discharge Follow-up with Specialty: Oncology follow-up as desired   As directed      Specialty: Oncology follow-up as desired               Time spent on Discharge including face to face service:  35 minutes    Signature: Electronically signed by Jos Colunga MD, 07/18/23, 08:38 EDT.  [facility] Hospitalist Team

## 2023-07-18 NOTE — PROGRESS NOTES
.Hematology/Oncology Inpatient Progress Note    PATIENT NAME: Flori Tubbs  : 1932  MRN: [id number]    CHIEF COMPLAINT: Large right pericardial/mediastinal mass, Abnormal cervical spine CT with history of cecal cancer and hypercalcemia; Stage I cecal cancer     HISTORY OF PRESENT ILLNESS:    89 y.o. female presented to [facility] ED on 2023 after falling at her nursing home.  She has dementia and is on a locked patient care unit.  Her son was present and reports she was diagnosed with dementia a year ago.  She recently had a decrease in her performance status and has not been calling him by name.  Most recently she has been mumbling.  CMP was remarkable for calcium of 10.2.  White count 8.9, hemoglobin 13.7, MCV 94.9 and platelets 241,000.  CT head, facial and cervical spine showed no acute intracranial abnormality.  There was a right frontal scalp hematoma laceration but no calvarial fracture.  Moderate generalized volume loss and moderate chronic small vessel ischemic changes were present.  There were comminuted minimally displaced bilateral nasal bone fractures with overlying soft tissue swelling.  There was no acute fracture or malalignment in the cervical spine.  Diffuse heterogeneous lucencies throughout the cervical and upper thoracic spine were present.  Suggestive of diffuse metastatic disease or multiple myeloma.  Moderate multilevel degenerative changes in the cervical spine were present.  Findings were discussed with her son with desire to further evaluate for possible malignancy.  Her CODE STATUS is DNR/DNI.    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  Diagnosed in 2018, treated with right hemicolectomy per Dr. Duffy.  Her tumor was 3.5 cm, 20 lymph nodes were negative for metastasis and margins were negative.  There was invasion but the invasion did not go through the muscularis propria.  MMR  was intact.  Colonoscopy on 10/12/2021 by Dr. Kirkland, showed multiple small polyps that were not resected.  There was bright red blood from moderate to severe left diverticular disease and grade 2 internal hemorrhoids.     07/12/23  Hematology/Oncology was consulted by QUAN Aleman for an abnormal CT cervical spine.     Past Medical History: Cecal cancer 2018.  Bilateral knee OA treated with injections by REKHA Jerome DO.  Diverticulitis and GI bleeding 2021.  Surgical history: Right hemicolectomy 2018.  Colonoscopy 2021.  Cardiac stent placement 2017. Cholecystectomy years ago.  Social History: She lives at [facility].  She worked minimally outside the home.  No history of smoking or alcohol use.  Family History: Her mother had thyroid cancer.  Allergies: No known allergies.     PCP: Kaye Wall APRN    INTERVAL HISTORY:  7/13/2023 - Ionized calcium 1.33 (1.20-1.30), CEA 1.81 (<3 in non-smoker), Calcium 9.7 (8.2-9.6).  7/14/2023 - CT CAP with contrast showed a large right pericardial/mediastinal mass, 9.8 x 7.7 x 9.4 cm with mixed cystic-solid components. It created a mass effect upon the mediastinum, deviating the heart to the left of midline. The mass had significantly enlarged since 2018. Differential diagnosis can include but was not limited to thymoma, thymic carcinoma, teratoma, metastatic disease of unknown primary origin.  No evidence of recurrent malignancy or metastatic disease in the abdomen or pelvis. Partial right hemicolectomy with ileocolic anastomosis. SPEP showed asymmetrical gamma.  FLC ratio 1.73, free kappa light chains 40.0 (3.3-19.4), free lambda light chains 23.1 (5.7-26.3).  7/15/2023-SIFE showed unclear presence of monoclonal protein.  IgA 291 (), IgG 891 ([id number]), IgM 41 ().  Surgery did not recommend biopsy secondary to comorbidities including dementia with inability to care for herself.  7/16/2023-more alert.  7/17/2023- Vanesa swab negative. Swallow  "study with recommendation for puree diet with thin liquids.   7/18/23-plan to discharge to [facility].    History of present illness reviewed since last visit and changes noted on 07/18/23.    Subjective   unable to perform review of systems.  She wakes up and tries to talk but her speech is incomprehensible.    ROS:  Review of Systems   Reason unable to perform ROS: Speech incomprehensible.      MEDICATIONS:    Scheduled Meds:    Continuous Infusions:  No current facility-administered medications for this encounter.  PRN Meds:       ALLERGIES:    Allergies   Allergen Reactions    Epinephrine Unknown (See Comments)    Sulfamethoxazole-Trimethoprim Rash     Objective    VITALS:   /98 (BP Location: Right arm, Patient Position: Lying)   Pulse 98   Temp 98.1 °F (36.7 °C) (Axillary)   Resp 16   Ht 167.6 cm (66\")   Wt 51 kg (112 lb 7 oz)   SpO2 98%   BMI 18.15 kg/m²     PHYSICAL EXAM:  Physical Exam  Vitals and nursing note reviewed.   Constitutional:       General: She is not in acute distress.     Appearance: Normal appearance. She is well-developed. She is not diaphoretic.      Comments: Underweight.   HENT:      Head: Normocephalic and atraumatic.      Comments: Facial swelling.     Nose: Nose normal.      Comments: Steri-strips to bridge of nose.      Mouth/Throat:      Mouth: Mucous membranes are moist.      Pharynx: Oropharynx is clear. No oropharyngeal exudate or posterior oropharyngeal erythema.      Comments: Dental fillings.   Eyes:      General: No scleral icterus.     Extraocular Movements: Extraocular movements intact.      Conjunctiva/sclera: Conjunctivae normal.      Pupils: Pupils are equal, round, and reactive to light.   Cardiovascular:      Rate and Rhythm: Normal rate and regular rhythm.      Heart sounds: Normal heart sounds. No murmur heard.     Comments: Cardiac monitor leads.   Pulmonary:      Effort: Pulmonary effort is normal. No respiratory distress.      Breath " sounds: Normal breath sounds. No stridor. No wheezing or rales.   Abdominal:      General: Bowel sounds are normal. There is no distension.      Palpations: Abdomen is soft. There is no mass.      Tenderness: There is no abdominal tenderness. There is no guarding.   Genitourinary:     Comments: Deferred   Musculoskeletal:         General: No swelling, tenderness or deformity. Normal range of motion.      Cervical back: Normal range of motion and neck supple.      Right lower leg: No edema.      Left lower leg: No edema.   Lymphadenopathy:      Cervical: No cervical adenopathy.      Upper Body:      Right upper body: No supraclavicular adenopathy.      Left upper body: No supraclavicular adenopathy.   Skin:     General: Skin is warm and dry.      Coloration: Skin is not pale.      Findings: No bruising, erythema or rash.      Comments: Left upper extremity IV.   Neurological:      General: No focal deficit present.      Mental Status: Mental status is at baseline.      Coordination: Coordination normal.      Comments: Unable to assess orientation.   Psychiatric:      Comments: Unable to assess       RECENT LABS:  Lab Results (last 24 hours)       Procedure Component Value Units Date/Time    CANDIDA AURIS SCREEN - Swab, Axilla Right, Axilla Left and Groin [[id number]]  (Normal) Collected: 07/16/23 0303    Specimen: Swab from Axilla Right, Axilla Left and Groin Updated: 07/18/23 0330     Candida Auris Screen Culture No Candida auris isolated at 2 days          PENDING RESULTS: None    IMAGING REVIEWED:  No radiology results for the last day    I have reviewed the patient's labs, imaging, reports, and other clinician documentation.    Assessment & Plan   ASSESSMENT  Large right pericardial/mediastinal mass, abnormal cervical spine CT with history of cecal cancer and hypercalcemia-concern for possible multiple myeloma or metastatic disease.  SPEP showed asymmetrical gamma, and elevated kappa/lambda ratio. SIFE findings  are unclear and immunoglobulins are normal. CT C/A/P showed a large mediastinal/pericardial mass. CEA normal. Ionized calcium was elevated on admission but is now low.  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  Have had a long discussion with son regarding options of treatment to include palliative care only versus biopsy of mediastinal mass through mediastinoscopy.  Seen by thoracic surgery with biopsy not recommended secondary to comorbidities.  Patient has been on outpatient hospice with [facility].   Stage I cecal cancer-she has never recurred and is up-to-date on her surveillance colonoscopy. CEA normal.   Fall with nasal fracture, osteoarthritis, dementia, and history of CVA-per primary team.  Hypoglycemia/lethargy-  Per primary team.     PLAN  Plan is discharge to [facility].   We will schedule follow-up as an outpatient as needed.    Patient seen and evaluated by Dr. Clifford.  Electronically signed by QUAN Smith, 07/18/23, 1:17 PM EDT.        I have personally performed a face-to-face diagnostic evaluation on this patient. I have performed a complete history and physical examination, reviewed laboratory studies, and radiographic examinations.  I have completed the majority and substantive portion of the medical decision making.  I have formulated the assessment on this patient and the plan of action as noted above. I have discussed the case with Alice Carpenter NP, have edited/reviewed the note, and agree with the care plan.  The patient tries to talk but has incomprehensible speech.  Son is present in the room.  Have discussed treatment options and prognosis in detail.  Plan is for her to be discharged on hospice care.            I discussed the patient's findings and my recommendations with patient and family.    Part of this note may be an electronic transcription/translation of spoken language to printed text using the  Dragon Dictation System.     Electronically signed by Rajinder Clifford MD, 07/18/23, 6:59 PM EDT.

## 2023-07-18 NOTE — CASE MANAGEMENT/SOCIAL WORK
Case Management Discharge Note      Final Note: [facility]ad[facility] with [facility].         Selected Continued Care - Discharged on 7/18/2023 Admission date: 7/12/2023 - Discharge disposition: Hospice/Home      Home Medical Care Coordination complete.      Service Provider Selected Services Address Phone Fax Patient Preferred    [facility] [address] [place] [address] [place] [address] [phone] -- --               Transportation Services  Ambulance: [facility]    Final Discharge Disposition Code: 50 - home with hospice

## 2023-07-18 NOTE — PLAN OF CARE
Goal Outcome Evaluation:              Outcome Evaluation: Pt. is discharging back to [facility] with hospice care.

## 2023-07-18 NOTE — PLAN OF CARE
Goal Outcome Evaluation:   Pt very agitated at the beginning of shift, grabbing her right hip. Pt settled down and slept well after IV morphine and PO ativan.

## 2023-07-18 NOTE — CASE MANAGEMENT/SOCIAL WORK
"Physicians Statement of Medical Necessity for  Ambulance Transportation    GENERAL INFORMATION     Name: Flori Tubbs  YOB: 1932  Medicare #:  [id number]     Transport Date: 23   Origin: [facility]  Destination: [facility]  Is the Patient's stay covered under Medicare Part A (PPS/DRG?)Yes  Closest appropriate facility? Yes  If this a hosp-hosp transfer? No  Is this a hospice patient? Yes, Is this transport related to patient's terminal illness? Yes    MEDICAL NECESSITY QUESTIONAIRE    Ambulance Transportation is medically necessary only if other means of transportation are contraindicated or would be potentially harmful to the patient.  To meet this requirement, the patient must be either \"bed confined\" or suffer from a condition such that transport by means other than an ambulance is contraindicated by the patient's condition.  The following questions must be answered by the healthcare professional signing below for this form to be valid:     1) Describe the MEDICAL CONDITION (physical and/or mental) of this patient AT THE TIME OF AMBULANCE TRANSPORT that requires the patient to be transported in an ambulance, and why transport by other means is contraindicated by the patient's condition:     Dementia, hx of CVA, malnutrition, somnolence, dependent with ADL's.    Past Medical History:   Diagnosis Date    Arthritis     CAD (coronary artery disease)     Colon cancer     COVID-19 2020    CVA (cerebral vascular accident)     Dementia     Difficulty walking     Glaucoma 2012    Hypertension     Knee swelling     Myocardial infarction     Palpitation     TIA (transient ischemic attack)     Weakness       Past Surgical History:   Procedure Laterality Date    CATARACT EXTRACTION       SECTION      COLON SURGERY  2018    COLONOSCOPY N/A 10/12/2021    Procedure: COLONOSCOPY;  Surgeon: Tommy Kirkland MD;  Location: [facility] ENDOSCOPY;  Service: " "Gastroenterology;  Laterality: N/A;  diverticulosis, lower GI Bleed    MASS EXCISION  colon      2) Is this patient \"bed confined\" as defined below?Yes   To be \"bed confined\" the patient must satisfy all three of the following criteria:  (1) unable to get up from bed without assistance; AND (2) unable to ambulate;  AND (3) unable to sit in a chair or wheelchair.  3) Can this patient safely be transported by car or wheelchair van (I.e., may safely sit during transport, without an attendant or monitoring?)No   4. In addition to completing questions 1-3 above, please check any of the following conditions that apply*:          *Note: supporting documentation for any boxes checked must be maintained in the patient's medical records Patient is confused, Moderate/severe pain on movement, and Unable to tolerate seated position for time needed to transport      SIGNATURE OF PHYSICIAN OR OTHER AUTHORIZED HEALTHCARE PROFESSIONAL    I certify that the above information is true and correct based on my evaluation of this patient, and represent that the patient requires transport by ambulance and that other forms of transport are contraindicated.  I understand that this information will be used by the Centers for Medicare and Medicaid Services (CMS) to support the determiniation of medical necessity for ambulance services, and I represent that I have personal knowledge of the patient's condition at the time of transport.    X   If this box is checked, I also certify that the patient is physically or mentally incapable of signing the ambulance service's claim form and that the institution with which I am affiliated has furnished care, services or assistance to the patient.  My signature below is made on behalf of the patient pursuant to 42 .36(b)(4). In accordance with 42 .37, the specific reason(s) that the patient is physically or mentally incapable of signing the claim for is as follows:     Signature of Physician " or Healthcare Professional:   MEGAN NAEGELE RN Date/Time:   07/18/23 09:30     (For Scheduled repetitive transport, this form is not valid for transports performed more than 60 days after this date).  MEGAN NAEGELE RN                                                                                                                                          --------------------------------------------------------------------------------------------  Printed Name and Credentials of Physician or Authorized Healthcare Professional     *Form must be signed by patient's attending physician for scheduled, repetitive transports,.  For non-repetitive ambulance transports, if unable to obtain the signature of the attending physician, any of the following may sign (please select below):     Physician  Clinical Nurse Specialist  X Registered Nurse     Physician Assistant  Discharge Planner  Licensed Practical Nurse     Nurse Practitioner  X

## 2023-07-21 LAB — BACTERIA ISLT: NORMAL

## (undated) DEVICE — PK ENDO GI 50

## (undated) DEVICE — PAPR PRNT PK SONY W RIBN UPC55